# Patient Record
Sex: MALE | Race: BLACK OR AFRICAN AMERICAN | NOT HISPANIC OR LATINO | Employment: FULL TIME | URBAN - METROPOLITAN AREA
[De-identification: names, ages, dates, MRNs, and addresses within clinical notes are randomized per-mention and may not be internally consistent; named-entity substitution may affect disease eponyms.]

---

## 2024-03-14 ENCOUNTER — HOSPITAL ENCOUNTER (INPATIENT)
Facility: HOSPITAL | Age: 54
LOS: 6 days | Discharge: HOME | DRG: 472 | End: 2024-03-20
Attending: EMERGENCY MEDICINE | Admitting: INTERNAL MEDICINE
Payer: MEDICAID

## 2024-03-14 DIAGNOSIS — R29.898 WEAKNESS OF BOTH UPPER EXTREMITIES: ICD-10-CM

## 2024-03-14 DIAGNOSIS — R07.9 CHEST PAIN: ICD-10-CM

## 2024-03-14 DIAGNOSIS — R00.0 TACHYCARDIA: ICD-10-CM

## 2024-03-14 DIAGNOSIS — G95.20 SPINAL CORD COMPRESSION: Primary | ICD-10-CM

## 2024-03-14 DIAGNOSIS — R20.0 LEFT UPPER EXTREMITY NUMBNESS: ICD-10-CM

## 2024-03-14 DIAGNOSIS — R20.0 RIGHT UPPER EXTREMITY NUMBNESS: ICD-10-CM

## 2024-03-14 DIAGNOSIS — M48.02 SPINAL STENOSIS, CERVICAL REGION: ICD-10-CM

## 2024-03-14 DIAGNOSIS — M48.02 CERVICAL SPINAL STENOSIS: ICD-10-CM

## 2024-03-14 PROBLEM — R20.2 PARESTHESIA AND PAIN OF BOTH UPPER EXTREMITIES: Status: ACTIVE | Noted: 2024-03-14

## 2024-03-14 PROBLEM — M79.601 PARESTHESIA AND PAIN OF BOTH UPPER EXTREMITIES: Status: ACTIVE | Noted: 2024-03-14

## 2024-03-14 PROBLEM — E11.9 DM (DIABETES MELLITUS): Status: ACTIVE | Noted: 2024-03-14

## 2024-03-14 PROBLEM — M79.602 PARESTHESIA AND PAIN OF BOTH UPPER EXTREMITIES: Status: ACTIVE | Noted: 2024-03-14

## 2024-03-14 PROBLEM — I10 ESSENTIAL HYPERTENSION: Status: ACTIVE | Noted: 2024-03-14

## 2024-03-14 LAB
ALBUMIN SERPL BCP-MCNC: 4 G/DL (ref 3.5–5.2)
ALP SERPL-CCNC: 78 U/L (ref 55–135)
ALT SERPL W/O P-5'-P-CCNC: 19 U/L (ref 10–44)
ANION GAP SERPL CALC-SCNC: 6 MMOL/L (ref 8–16)
AST SERPL-CCNC: 18 U/L (ref 10–40)
BASOPHILS # BLD AUTO: 0.04 K/UL (ref 0–0.2)
BASOPHILS NFR BLD: 0.6 % (ref 0–1.9)
BILIRUB SERPL-MCNC: 0.6 MG/DL (ref 0.1–1)
BNP SERPL-MCNC: 16 PG/ML (ref 0–99)
BUN SERPL-MCNC: 20 MG/DL (ref 6–20)
CALCIUM SERPL-MCNC: 9.6 MG/DL (ref 8.7–10.5)
CHLORIDE SERPL-SCNC: 104 MMOL/L (ref 95–110)
CO2 SERPL-SCNC: 27 MMOL/L (ref 23–29)
CREAT SERPL-MCNC: 1.4 MG/DL (ref 0.5–1.4)
DIFFERENTIAL METHOD BLD: ABNORMAL
EOSINOPHIL # BLD AUTO: 0.1 K/UL (ref 0–0.5)
EOSINOPHIL NFR BLD: 1.6 % (ref 0–8)
ERYTHROCYTE [DISTWIDTH] IN BLOOD BY AUTOMATED COUNT: 12.3 % (ref 11.5–14.5)
EST. GFR  (NO RACE VARIABLE): 59.4 ML/MIN/1.73 M^2
GLUCOSE SERPL-MCNC: 151 MG/DL (ref 70–110)
HCT VFR BLD AUTO: 34 % (ref 40–54)
HGB BLD-MCNC: 10.4 G/DL (ref 14–18)
IMM GRANULOCYTES # BLD AUTO: 0.03 K/UL (ref 0–0.04)
IMM GRANULOCYTES NFR BLD AUTO: 0.5 % (ref 0–0.5)
LYMPHOCYTES # BLD AUTO: 2.1 K/UL (ref 1–4.8)
LYMPHOCYTES NFR BLD: 33.8 % (ref 18–48)
MAGNESIUM SERPL-MCNC: 1.6 MG/DL (ref 1.6–2.6)
MCH RBC QN AUTO: 25.4 PG (ref 27–31)
MCHC RBC AUTO-ENTMCNC: 30.6 G/DL (ref 32–36)
MCV RBC AUTO: 83 FL (ref 82–98)
MONOCYTES # BLD AUTO: 0.4 K/UL (ref 0.3–1)
MONOCYTES NFR BLD: 6.7 % (ref 4–15)
NEUTROPHILS # BLD AUTO: 3.6 K/UL (ref 1.8–7.7)
NEUTROPHILS NFR BLD: 56.8 % (ref 38–73)
NRBC BLD-RTO: 0 /100 WBC
PLATELET # BLD AUTO: 270 K/UL (ref 150–450)
PMV BLD AUTO: 9.3 FL (ref 9.2–12.9)
POTASSIUM SERPL-SCNC: 4.4 MMOL/L (ref 3.5–5.1)
PROT SERPL-MCNC: 7.3 G/DL (ref 6–8.4)
RBC # BLD AUTO: 4.1 M/UL (ref 4.6–6.2)
SODIUM SERPL-SCNC: 137 MMOL/L (ref 136–145)
TROPONIN I SERPL HS-MCNC: 11.2 PG/ML (ref 0–14.9)
TROPONIN I SERPL HS-MCNC: 12.3 PG/ML (ref 0–14.9)
TSH SERPL DL<=0.005 MIU/L-ACNC: 0.86 UIU/ML (ref 0.34–5.6)
VIT B12 SERPL-MCNC: 664 PG/ML (ref 210–950)
WBC # BLD AUTO: 6.25 K/UL (ref 3.9–12.7)

## 2024-03-14 PROCEDURE — 93005 ELECTROCARDIOGRAM TRACING: CPT | Performed by: GENERAL PRACTICE

## 2024-03-14 PROCEDURE — 80053 COMPREHEN METABOLIC PANEL: CPT

## 2024-03-14 PROCEDURE — 84484 ASSAY OF TROPONIN QUANT: CPT | Mod: 91

## 2024-03-14 PROCEDURE — 99285 EMERGENCY DEPT VISIT HI MDM: CPT | Mod: 25

## 2024-03-14 PROCEDURE — 85025 COMPLETE CBC W/AUTO DIFF WBC: CPT

## 2024-03-14 PROCEDURE — 63600175 PHARM REV CODE 636 W HCPCS: Performed by: NURSE PRACTITIONER

## 2024-03-14 PROCEDURE — 83880 ASSAY OF NATRIURETIC PEPTIDE: CPT

## 2024-03-14 PROCEDURE — 84443 ASSAY THYROID STIM HORMONE: CPT | Performed by: EMERGENCY MEDICINE

## 2024-03-14 PROCEDURE — 99223 1ST HOSP IP/OBS HIGH 75: CPT | Mod: ,,, | Performed by: NEUROLOGICAL SURGERY

## 2024-03-14 PROCEDURE — G0378 HOSPITAL OBSERVATION PER HR: HCPCS

## 2024-03-14 PROCEDURE — 36415 COLL VENOUS BLD VENIPUNCTURE: CPT | Performed by: EMERGENCY MEDICINE

## 2024-03-14 PROCEDURE — 12000002 HC ACUTE/MED SURGE SEMI-PRIVATE ROOM

## 2024-03-14 PROCEDURE — 82607 VITAMIN B-12: CPT | Performed by: EMERGENCY MEDICINE

## 2024-03-14 PROCEDURE — 96372 THER/PROPH/DIAG INJ SC/IM: CPT | Performed by: NURSE PRACTITIONER

## 2024-03-14 PROCEDURE — 25000003 PHARM REV CODE 250: Performed by: NURSE PRACTITIONER

## 2024-03-14 PROCEDURE — 93010 ELECTROCARDIOGRAM REPORT: CPT | Mod: ,,, | Performed by: GENERAL PRACTICE

## 2024-03-14 PROCEDURE — 99499 UNLISTED E&M SERVICE: CPT | Mod: ,,, | Performed by: NEUROLOGICAL SURGERY

## 2024-03-14 PROCEDURE — 83735 ASSAY OF MAGNESIUM: CPT

## 2024-03-14 RX ORDER — LANOLIN ALCOHOL/MO/W.PET/CERES
800 CREAM (GRAM) TOPICAL
Status: DISCONTINUED | OUTPATIENT
Start: 2024-03-14 | End: 2024-03-20

## 2024-03-14 RX ORDER — ENOXAPARIN SODIUM 100 MG/ML
40 INJECTION SUBCUTANEOUS EVERY 24 HOURS
Status: DISCONTINUED | OUTPATIENT
Start: 2024-03-14 | End: 2024-03-17

## 2024-03-14 RX ORDER — VALSARTAN 80 MG/1
320 TABLET ORAL DAILY
Status: DISCONTINUED | OUTPATIENT
Start: 2024-03-15 | End: 2024-03-18

## 2024-03-14 RX ORDER — VALSARTAN 320 MG/1
1 TABLET ORAL DAILY
COMMUNITY
Start: 2023-10-19

## 2024-03-14 RX ORDER — CHLORTHALIDONE 25 MG/1
25 TABLET ORAL DAILY
Status: DISCONTINUED | OUTPATIENT
Start: 2024-03-15 | End: 2024-03-18

## 2024-03-14 RX ORDER — ACETAMINOPHEN 500 MG
1000 TABLET ORAL
Status: ACTIVE | OUTPATIENT
Start: 2024-03-14 | End: 2024-03-14

## 2024-03-14 RX ORDER — GABAPENTIN 300 MG/1
300 CAPSULE ORAL 3 TIMES DAILY
Status: DISCONTINUED | OUTPATIENT
Start: 2024-03-14 | End: 2024-03-20

## 2024-03-14 RX ORDER — AMLODIPINE BESYLATE 10 MG/1
1 TABLET ORAL DAILY
Status: ON HOLD | COMMUNITY
Start: 2024-02-08 | End: 2024-03-19 | Stop reason: HOSPADM

## 2024-03-14 RX ORDER — INSULIN ASPART 100 [IU]/ML
0-5 INJECTION, SOLUTION INTRAVENOUS; SUBCUTANEOUS
Status: DISCONTINUED | OUTPATIENT
Start: 2024-03-14 | End: 2024-03-20 | Stop reason: HOSPADM

## 2024-03-14 RX ORDER — IBUPROFEN 200 MG
24 TABLET ORAL
Status: DISCONTINUED | OUTPATIENT
Start: 2024-03-14 | End: 2024-03-20 | Stop reason: HOSPADM

## 2024-03-14 RX ORDER — SODIUM CHLORIDE 0.9 % (FLUSH) 0.9 %
2 SYRINGE (ML) INJECTION EVERY 12 HOURS PRN
Status: DISCONTINUED | OUTPATIENT
Start: 2024-03-14 | End: 2024-03-20 | Stop reason: HOSPADM

## 2024-03-14 RX ORDER — GLUCAGON 1 MG
1 KIT INJECTION
Status: DISCONTINUED | OUTPATIENT
Start: 2024-03-14 | End: 2024-03-20 | Stop reason: HOSPADM

## 2024-03-14 RX ORDER — ALUMINUM HYDROXIDE, MAGNESIUM HYDROXIDE, AND SIMETHICONE 1200; 120; 1200 MG/30ML; MG/30ML; MG/30ML
30 SUSPENSION ORAL 4 TIMES DAILY PRN
Status: DISCONTINUED | OUTPATIENT
Start: 2024-03-14 | End: 2024-03-19

## 2024-03-14 RX ORDER — AMOXICILLIN 250 MG
1 CAPSULE ORAL DAILY
Status: DISCONTINUED | OUTPATIENT
Start: 2024-03-15 | End: 2024-03-20 | Stop reason: HOSPADM

## 2024-03-14 RX ORDER — AMLODIPINE BESYLATE 5 MG/1
10 TABLET ORAL DAILY
Status: DISCONTINUED | OUTPATIENT
Start: 2024-03-15 | End: 2024-03-20

## 2024-03-14 RX ORDER — CHLORTHALIDONE 25 MG/1
1 TABLET ORAL DAILY
COMMUNITY
Start: 2024-01-25 | End: 2025-01-24

## 2024-03-14 RX ORDER — SODIUM,POTASSIUM PHOSPHATES 280-250MG
2 POWDER IN PACKET (EA) ORAL
Status: DISCONTINUED | OUTPATIENT
Start: 2024-03-14 | End: 2024-03-20 | Stop reason: HOSPADM

## 2024-03-14 RX ORDER — ACETAMINOPHEN 325 MG/1
650 TABLET ORAL EVERY 8 HOURS PRN
Status: DISCONTINUED | OUTPATIENT
Start: 2024-03-14 | End: 2024-03-20 | Stop reason: HOSPADM

## 2024-03-14 RX ORDER — ACETAMINOPHEN 325 MG/1
650 TABLET ORAL EVERY 4 HOURS PRN
Status: DISCONTINUED | OUTPATIENT
Start: 2024-03-14 | End: 2024-03-20 | Stop reason: HOSPADM

## 2024-03-14 RX ORDER — ATORVASTATIN CALCIUM 40 MG/1
40 TABLET, FILM COATED ORAL DAILY
Status: ON HOLD | COMMUNITY
End: 2024-03-19 | Stop reason: HOSPADM

## 2024-03-14 RX ORDER — HYDROCODONE BITARTRATE AND ACETAMINOPHEN 5; 325 MG/1; MG/1
1 TABLET ORAL EVERY 6 HOURS PRN
Status: DISCONTINUED | OUTPATIENT
Start: 2024-03-14 | End: 2024-03-20 | Stop reason: HOSPADM

## 2024-03-14 RX ORDER — TALC
6 POWDER (GRAM) TOPICAL NIGHTLY PRN
Status: DISCONTINUED | OUTPATIENT
Start: 2024-03-14 | End: 2024-03-20 | Stop reason: HOSPADM

## 2024-03-14 RX ORDER — IBUPROFEN 200 MG
16 TABLET ORAL
Status: DISCONTINUED | OUTPATIENT
Start: 2024-03-14 | End: 2024-03-20 | Stop reason: HOSPADM

## 2024-03-14 RX ORDER — NALOXONE HCL 0.4 MG/ML
0.02 VIAL (ML) INJECTION
Status: DISCONTINUED | OUTPATIENT
Start: 2024-03-14 | End: 2024-03-20 | Stop reason: HOSPADM

## 2024-03-14 RX ORDER — ONDANSETRON HYDROCHLORIDE 2 MG/ML
4 INJECTION, SOLUTION INTRAVENOUS EVERY 6 HOURS PRN
Status: DISCONTINUED | OUTPATIENT
Start: 2024-03-14 | End: 2024-03-20

## 2024-03-14 RX ORDER — METHOCARBAMOL 750 MG/1
750 TABLET, FILM COATED ORAL 4 TIMES DAILY
Status: DISCONTINUED | OUTPATIENT
Start: 2024-03-14 | End: 2024-03-20

## 2024-03-14 RX ORDER — ATORVASTATIN CALCIUM 40 MG/1
40 TABLET, FILM COATED ORAL DAILY
Status: DISCONTINUED | OUTPATIENT
Start: 2024-03-15 | End: 2024-03-18

## 2024-03-14 RX ORDER — HYDROMORPHONE HYDROCHLORIDE 1 MG/ML
0.5 INJECTION, SOLUTION INTRAMUSCULAR; INTRAVENOUS; SUBCUTANEOUS
Status: ACTIVE | OUTPATIENT
Start: 2024-03-14 | End: 2024-03-15

## 2024-03-14 RX ADMIN — METHOCARBAMOL TABLETS 750 MG: 750 TABLET, COATED ORAL at 09:03

## 2024-03-14 RX ADMIN — ENOXAPARIN SODIUM 40 MG: 100 INJECTION SUBCUTANEOUS at 09:03

## 2024-03-14 RX ADMIN — GABAPENTIN 300 MG: 300 CAPSULE ORAL at 09:03

## 2024-03-14 NOTE — SUBJECTIVE & OBJECTIVE
"(Not in a hospital admission)      Review of patient's allergies indicates:  No Known Allergies    No past medical history on file.  No past surgical history on file.  Family History    None       Tobacco Use    Smoking status: Not on file    Smokeless tobacco: Not on file   Substance and Sexual Activity    Alcohol use: Not on file    Drug use: Not on file    Sexual activity: Not on file       Objective:     Weight: 104.3 kg (230 lb)  Body mass index is 33.97 kg/m².  Vital Signs (Most Recent):  Temp: 98.4 °F (36.9 °C) (03/14/24 0825)  Pulse: 65 (03/14/24 1702)  Resp: 16 (03/14/24 1657)  BP: (!) 174/80 (03/14/24 1702)  SpO2: 99 % (03/14/24 1702) Vital Signs (24h Range):  Temp:  [98.4 °F (36.9 °C)] 98.4 °F (36.9 °C)  Pulse:  [64-90] 65  Resp:  [10-20] 16  SpO2:  [98 %-100 %] 99 %  BP: (135-204)/() 174/80         Neurosurgery Physical Exam  General: well developed, well nourished, no distress.   Head: normocephalic  Neck: No tracheal deviation.    Neurologic: Alert and oriented. Thought content appropriate.  GCS: E4 V5 M6; Total: 15  Pulmonary: normal respirations, no signs of respiratory distress  Skin: Skin is warm, dry and intact.    Sensory: intact to light touch throughout  Motor Strength: Moves all extremities spontaneously with good tone.    Upper extremity strength bilaterally 5/5 throughout  Lower extremity strength bilaterally 5/5 throughout     Tabor negative bilaterally  Clonus negative bilaterally  Left upper extremity reflexes 3+  Right upper extremity reflexes 1+  Bilaterally lower extremity reflexes 1+    Significant Labs:  Recent Labs   Lab 03/14/24  1115   *      K 4.4      CO2 27   BUN 20   CREATININE 1.4   CALCIUM 9.6   MG 1.6     Recent Labs   Lab 03/14/24  1115   WBC 6.25   HGB 10.4*   HCT 34.0*        No results for input(s): "LABPT", "INR", "APTT" in the last 48 hours.  Microbiology Results (last 7 days)       ** No results found for the last 168 hours. **      "

## 2024-03-14 NOTE — SUBJECTIVE & OBJECTIVE
No past medical history on file.    No past surgical history on file.    Review of patient's allergies indicates:  No Known Allergies    No current facility-administered medications on file prior to encounter.     No current outpatient medications on file prior to encounter.     Family History    None       Tobacco Use    Smoking status: Not on file    Smokeless tobacco: Not on file   Substance and Sexual Activity    Alcohol use: Not on file    Drug use: Not on file    Sexual activity: Not on file     Review of Systems   Constitutional:  Positive for activity change and fatigue.   Neurological:  Positive for weakness and numbness.   All other systems reviewed and are negative.    Objective:     Vital Signs (Most Recent):  Temp: 98.4 °F (36.9 °C) (03/14/24 0825)  Pulse: 65 (03/14/24 1657)  Resp: 16 (03/14/24 1657)  BP: (!) 204/82 (03/14/24 1632)  SpO2: 100 % (03/14/24 1657) Vital Signs (24h Range):  Temp:  [98.4 °F (36.9 °C)] 98.4 °F (36.9 °C)  Pulse:  [64-90] 65  Resp:  [10-20] 16  SpO2:  [98 %-100 %] 100 %  BP: (135-204)/() 204/82     Weight: 104.3 kg (230 lb)  Body mass index is 33.97 kg/m².     Physical Exam  Vitals reviewed.   Constitutional:       General: He is not in acute distress.     Appearance: Normal appearance. He is obese.   HENT:      Head: Normocephalic and atraumatic.      Mouth/Throat:      Mouth: Mucous membranes are moist.   Eyes:      Extraocular Movements: Extraocular movements intact.      Pupils: Pupils are equal, round, and reactive to light.   Cardiovascular:      Rate and Rhythm: Normal rate and regular rhythm.      Pulses: Normal pulses.      Heart sounds: Murmur heard.   Pulmonary:      Effort: Pulmonary effort is normal.      Breath sounds: Normal breath sounds.   Abdominal:      General: Bowel sounds are normal. There is no distension.      Palpations: Abdomen is soft.      Tenderness: There is no abdominal tenderness.   Musculoskeletal:      Cervical back: Normal range of motion  and neck supple. No tenderness.      Right lower leg: No edema.      Left lower leg: No edema.      Comments: Positive Tinel sign, postive Phalen maneuver   Skin:     General: Skin is warm.   Neurological:      Mental Status: He is alert and oriented to person, place, and time.      GCS: GCS eye subscore is 4. GCS verbal subscore is 5. GCS motor subscore is 6.      Cranial Nerves: Cranial nerves 2-12 are intact.      Sensory: Sensory deficit present.      Motor: Weakness and tremor present.      Coordination: Finger-Nose-Finger Test abnormal.      Comments: Bilateral UE with decrease sensation.   Ataxia   Hand grasp weakness              CRANIAL NERVES     CN III, IV, VI   Pupils are equal, round, and reactive to light.       Significant Labs: All pertinent labs within the past 24 hours have been reviewed.  Recent Lab Results         03/14/24  1540   03/14/24  1115        Albumin   4.0       ALP   78       ALT   19       Anion Gap   6       AST   18       Baso #   0.04       Basophil %   0.6       BILIRUBIN TOTAL   0.6  Comment: For infants and newborns, interpretation of results should be based  on gestational age, weight and in agreement with clinical  observations.    Premature Infant recommended reference ranges:  Up to 24 hours.............<8.0 mg/dL  Up to 48 hours............<12.0 mg/dL  3-5 days..................<15.0 mg/dL  6-29 days.................<15.0 mg/dL         BNP   16  Comment: Values of less than 100 pg/ml are consistent with non-CHF populations.       BUN   20       Calcium   9.6       Chloride   104       CO2   27       Creatinine   1.4       Differential Method   Automated       eGFR   59.4       Eos #   0.1       Eos %   1.6       Glucose   151       Gran # (ANC)   3.6       Gran %   56.8       Hematocrit   34.0       Hemoglobin   10.4       Immature Grans (Abs)   0.03  Comment: Mild elevation in immature granulocytes is non specific and   can be seen in a variety of conditions including  stress response,   acute inflammation, trauma and pregnancy. Correlation with other   laboratory and clinical findings is essential.         Immature Granulocytes   0.5       Lymph #   2.1       Lymph %   33.8       Magnesium    1.6       MCH   25.4       MCHC   30.6       MCV   83       Mono #   0.4       Mono %   6.7       MPV   9.3       nRBC   0       Platelet Count   270       Potassium   4.4       PROTEIN TOTAL   7.3       RBC   4.10       RDW   12.3       Sodium   137       Troponin I High Sensitivity 11.2  Comment: Troponin results differ between methods. Do not use   results between Troponin methods interchangeably.    Alkaline Phospatase levels above 400 U/L may   cause false positive results.    Access hsTnI should not be used for patients taking   Asfotase quan (Strensiq).     12.3  Comment: Troponin results differ between methods. Do not use   results between Troponin methods interchangeably.    Alkaline Phospatase levels above 400 U/L may   cause false positive results.    Access hsTnI should not be used for patients taking   Asfotase quan (Strensiq).         TSH   0.860       Vitamin B12   664       WBC   6.25               Significant Imaging: I have reviewed all pertinent imaging results/findings within the past 24 hours.    EKG 12-lead (In process)           Collection Time Result Time QRS Duration OHS QTC Calculation     03/14/24 11:03:28 03/14/24 11:17:25 128 452                               In process by Interface, Lab In [facility] (03/14/24 11:17:32)                           Narrative:     Test Reason : R07.9,     Vent. Rate : 068 BPM     Atrial Rate : 068 BPM     P-R Int : 218 ms          QRS Dur : 128 ms      QT Int : 426 ms       P-R-T Axes : 059 -28 002 degrees     QTc Int : 452 ms     Sinus rhythm with 1st degree A-V block  LVH with QRS widening  Abnormal ECG  No previous ECGs available     Referred By: [email or web address]   SELF           Confirmed By:                                           Imaging Results                  CT Cervical Spine Without Contrast (Final result)  Result time 03/14/24 15:28:54            Final result by Jeanmarie Reeves MD (03/14/24 15:28:54)                           Narrative:     CMS MANDATED QUALITY DATA - CT RADIATION  436     All CT scans at this facility utilize dose modulation, iterative reconstruction, and/or weight based dosing when appropriate to reduce radiation dose to as low as reasonably achievable.     CLINICAL HISTORY:  53 years (1970) Male Spinal stenosis, cervical Numbness (BUE x 4 days)     TECHNIQUE:  CT CERVICAL SPINE WITHOUT IV CONTRAST. Contiguous thin section axial images were obtained of the cervical spine. Sagittal and coronal reformatted images were generated. Note that this exam is suboptimal for evaluation of disc disease (which would be better evaluated on MRI) and does not assess for ligamentous injury or stability.     COMPARISON:  None available.     FINDINGS:  No acute fracture of the cervical spine. No traumatic malalignment of the cervical spine.  No evidence of significant intraspinal abnormality.  No perched, jumped or locked facets seen. Vertebral body heights are maintained. There is straightening of the normal lordotic curvature of the cervical spine. There is mild disc height loss throughout the cervical spine with moderate-sized adjacent endplate osteophytes from C2 through C7, with scattered facet arthropathy (for which this study is not tailored to assess). Visualized brain is unremarkable. Visualized lung apices are essentially clear.     IMPRESSION:  1. No acute fracture or traumatic malalignment of the cervical spine.  2. Straightening of the normal lordotic curvature likely secondary to combination of positioning, degenerative change and/or muscle spasm.  3. Scattered degenerative change throughout the cervical spine.                             .     Electronically signed by:  Jeanmarie Reeves MD  03/14/2024 03:28 PM  CDT Workstation: [id number]                                             MRI Cervical Spine Without Contrast (Final result)  Result time 03/14/24 11:15:42            Final result by Houston Hayden MD (03/14/24 11:15:42)                           Impression:        1. Broad-based disc protrusion with subligamentous extruded component at C3-C4, producing severe spinal canal stenosis and cervical cord compression, with findings of cord edema and or myelopathy.  Neurosurgical consultation is recommended.  2. Additional multilevel degenerative disc disease, with spinal canal stenosis and neural foraminal narrowing as described.  3. Straightening of the normal cervical spinal curvature, which could be positional, or reflective of paraspinal muscular spasm.        Electronically signed by:        Houston Hadyen MD  Date:                                        03/14/2024  Time:                                                11:15                     Narrative:     EXAMINATION:  MRI CERVICAL SPINE WITHOUT CONTRAST     CLINICAL HISTORY:  Cervical radiculopathy, no red flags;     TECHNIQUE:  Routine noncontrast MRI cervical spine.     FINDINGS:  No prior studies for comparison.  There is straightening of the normal cervical spinal curvature, with normal vertebral body heights and alignment, and no acute fractures or destructive osseous lesions.  Degenerative loss of disc height and signal involves all levels, with ventral osteophytes at multiple levels.  The visualized posterior fossa and cervicomedullary junction are normal.  Focal non fluid like intramedullary T2 hyperintensity involves the cervical cord at the C3-C4 level, with the cervical cord otherwise normal.     At C2-C3, there is no significant disc bulging or focal disc herniation, with mild spinal canal stenosis.     At C3-C4, there is a broad-based disc protrusion asymmetric to the right, with subligamentous extruded component.  This results in severe spinal canal  stenosis asymmetric to the right, with cervical cord compression.  There is increased non fluid-like T2 signal in the cord suggesting edema and or myelopathy.  There is severe bilateral foraminal narrowing.     At C4-C5, there is a broad-based central disc protrusion, which contacts the ventral cervical cord and produces moderate spinal canal stenosis.  There is mild-to-moderate bilateral foraminal narrowing.     At C5-C6, there is broad-based disc bulging, indenting the ventral thecal sac and producing mild spinal canal stenosis.  There is moderate right neural foraminal narrowing.     At C6-C7, there is mild broad-based disc bulging and mild spinal canal stenosis, without significant foraminal narrowing.     At C7-T1, there is no significant abnormality.     There are no signal abnormalities of the paraspinal musculature or paraspinal soft tissues.  No enlarged cervical chain lymph nodes.                                                MRI Brain Without Contrast (Final result)  Result time 03/14/24 11:07:04            Final result by Keith Tee MD (03/14/24 11:07:04)                           Impression:        Negative for acute ischemia or acute intracranial pathology.        Electronically signed by:        Keith Tee MD  Date:                                        03/14/2024  Time:                                                11:07                     Narrative:     EXAMINATION:  MRI BRAIN WITHOUT CONTRAST     CLINICAL HISTORY:  Stroke, follow up;     TECHNIQUE:  Multisequence, multiplanar imaging through the brain performed without IV contrast.     COMPARISON:  None     FINDINGS:  Diffusion-weighted imaging is negative for acute ischemia or focal lesion.  Gradient echo images show no abnormal intracranial susceptibility artifact.     No intracranial mass, midline shift, or mass effect.  No significant white matter signal abnormality.  Cerebellar hemispheres and brainstem are unremarkable.   Major intracranial T2 flow voids appear patent.     Mastoid air cells are hypoplastic and clear.  Paranasal sinuses are unremarkable.     Diminished T1 signal intensity of the calvarium and cervical spine, likely on the basis of red marrow reconversion.  Pituitary gland is unremarkable.                                                X-Ray Chest AP Portable (Final result)  Result time 03/14/24 10:58:06            Final result by Houston Hayden MD (03/14/24 10:58:06)                           Impression:        No evidence of active cardiopulmonary disease.        Electronically signed by:        Houston Hayden MD  Date:                                        03/14/2024  Time:                                                10:58                     Narrative:     EXAMINATION:  XR CHEST AP PORTABLE     CLINICAL HISTORY:  Chest Pain;     FINDINGS:  Portable chest radiograph at 0952 hours with no prior studies for comparison shows the cardiomediastinal silhouette and pulmonary vasculature are within normal limits.     The lungs are normally expanded, with no consolidation, pleural effusion, or evidence of pulmonary edema. No confluent infiltrates or pneumothorax. There are no significant osseous abnormalities.

## 2024-03-14 NOTE — H&P
[facility] - Emergency Dept  Hospital Medicine  History & Physical    Patient Name: Obed Alfred  MRN: [id number]  Patient Class: OP- Observation  Admission Date: 3/14/2024  Attending Physician: Marito Saldivar MD   Primary Care Provider: Juany Robertson MD         Patient information was obtained from patient and ER records.     Subjective:     Principal Problem:Weakness of both upper extremities    Chief Complaint:   Chief Complaint   Patient presents with    Numbness     BUE x 4 days        HPI: 53 year old male with a past medical history of HTN, HLD, DM, anemia presents to the emergency room with reports of numbness and weakness to both upper extremities and hands that began 4 days ago. Reports started in both hands than radiated to elbows. 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 Reports resting makes it better and using arms makes it worse. Denies fever, chills, recent travel, URI symptoms, GI symptoms, rash, dysuria, abdominal pain, headache, recent trauma. Started new blood pressure of chlorthalidone a week ago.   In ER, CBC and CMP unremarkable. Troponin negative x2. B12 TSH within normal limits. EKG with 1st degree AV block, no ST elevation, CT c spine: no acute fracture, degenerative changes in cervical spine,  MRI c spine: C3-C4, there is a broad-based disc protrusion asymmetric to the right, with subligamentous extruded component. This results in severe spinal canal stenosis asymmetric to the right, with cervical cord compression. There is increased non fluid-like T2 signal in the cord suggesting edema and or myelopathy. There is severe bilateral foraminal narrowing. C4-C5, there is a broad-based central disc protrusion, which contacts the ventral cervical cord and produces moderate spinal canal stenosis. There is mild-to-moderate bilateral foraminal narrowing.  C5-C6, there is broad-based disc bulging, indenting the  ventral thecal sac and producing mild spinal canal stenosis.  There is moderate right neural foraminal narrowing.At C6-C7, there is mild broad-based disc bulging and mild spinal canal stenosis, without significant foraminal narrowing. MRI brain negative for acute ischemic changes. Admit to hospital medicine with consults to Neurology and Neurosurgery. Dilaudid and Tylenol given in ER.     No past medical history on file.    No past surgical history on file.    Review of patient's allergies indicates:  No Known Allergies    No current facility-administered medications on file prior to encounter.     No current outpatient medications on file prior to encounter.     Family History    None       Tobacco Use    Smoking status: Not on file    Smokeless tobacco: Not on file   Substance and Sexual Activity    Alcohol use: Not on file    Drug use: Not on file    Sexual activity: Not on file     Review of Systems   Constitutional:  Positive for activity change and fatigue.   Neurological:  Positive for weakness and numbness.   All other systems reviewed and are negative.    Objective:     Vital Signs (Most Recent):  Temp: 98.4 °F (36.9 °C) (03/14/24 0825)  Pulse: 65 (03/14/24 1657)  Resp: 16 (03/14/24 1657)  BP: (!) 204/82 (03/14/24 1632)  SpO2: 100 % (03/14/24 1657) Vital Signs (24h Range):  Temp:  [98.4 °F (36.9 °C)] 98.4 °F (36.9 °C)  Pulse:  [64-90] 65  Resp:  [10-20] 16  SpO2:  [98 %-100 %] 100 %  BP: (135-204)/() 204/82     Weight: 104.3 kg (230 lb)  Body mass index is 33.97 kg/m².     Physical Exam  Vitals reviewed.   Constitutional:       General: He is not in acute distress.     Appearance: Normal appearance. He is obese.   HENT:      Head: Normocephalic and atraumatic.      Mouth/Throat:      Mouth: Mucous membranes are moist.   Eyes:      Extraocular Movements: Extraocular movements intact.      Pupils: Pupils are equal, round, and reactive to light.   Cardiovascular:      Rate and Rhythm: Normal rate and  regular rhythm.      Pulses: Normal pulses.      Heart sounds: Murmur heard.   Pulmonary:      Effort: Pulmonary effort is normal.      Breath sounds: Normal breath sounds.   Abdominal:      General: Bowel sounds are normal. There is no distension.      Palpations: Abdomen is soft.      Tenderness: There is no abdominal tenderness.   Musculoskeletal:      Cervical back: Normal range of motion and neck supple. No tenderness.      Right lower leg: No edema.      Left lower leg: No edema.      Comments: Positive Tinel sign, postive Phalen maneuver   Skin:     General: Skin is warm.   Neurological:      Mental Status: He is alert and oriented to person, place, and time.      GCS: GCS eye subscore is 4. GCS verbal subscore is 5. GCS motor subscore is 6.      Cranial Nerves: Cranial nerves 2-12 are intact.      Sensory: Sensory deficit present.      Motor: Weakness and tremor present.      Coordination: Finger-Nose-Finger Test abnormal.      Comments: Bilateral UE with decrease sensation. Hyperreflexia   Ataxia Hand grasp weakness              CRANIAL NERVES     CN III, IV, VI   Pupils are equal, round, and reactive to light.       Significant Labs: All pertinent labs within the past 24 hours have been reviewed.  Recent Lab Results         03/14/24  1540   03/14/24  1115        Albumin   4.0       ALP   78       ALT   19       Anion Gap   6       AST   18       Baso #   0.04       Basophil %   0.6       BILIRUBIN TOTAL   0.6  Comment: For infants and newborns, interpretation of results should be based  on gestational age, weight and in agreement with clinical  observations.    Premature Infant recommended reference ranges:  Up to 24 hours.............<8.0 mg/dL  Up to 48 hours............<12.0 mg/dL  3-5 days..................<15.0 mg/dL  6-29 days.................<15.0 mg/dL         BNP   16  Comment: Values of less than 100 pg/ml are consistent with non-CHF populations.       BUN   20       Calcium   9.6       Chloride    104       CO2   27       Creatinine   1.4       Differential Method   Automated       eGFR   59.4       Eos #   0.1       Eos %   1.6       Glucose   151       Gran # (ANC)   3.6       Gran %   56.8       Hematocrit   34.0       Hemoglobin   10.4       Immature Grans (Abs)   0.03  Comment: Mild elevation in immature granulocytes is non specific and   can be seen in a variety of conditions including stress response,   acute inflammation, trauma and pregnancy. Correlation with other   laboratory and clinical findings is essential.         Immature Granulocytes   0.5       Lymph #   2.1       Lymph %   33.8       Magnesium    1.6       MCH   25.4       MCHC   30.6       MCV   83       Mono #   0.4       Mono %   6.7       MPV   9.3       nRBC   0       Platelet Count   270       Potassium   4.4       PROTEIN TOTAL   7.3       RBC   4.10       RDW   12.3       Sodium   137       Troponin I High Sensitivity 11.2  Comment: Troponin results differ between methods. Do not use   results between Troponin methods interchangeably.    Alkaline Phospatase levels above 400 U/L may   cause false positive results.    Access hsTnI should not be used for patients taking   Asfotase quan (Strensiq).     12.3  Comment: Troponin results differ between methods. Do not use   results between Troponin methods interchangeably.    Alkaline Phospatase levels above 400 U/L may   cause false positive results.    Access hsTnI should not be used for patients taking   Asfotase quan (Strensiq).         TSH   0.860       Vitamin B12   664       WBC   6.25               Significant Imaging: I have reviewed all pertinent imaging results/findings within the past 24 hours.    EKG 12-lead (In process)           Collection Time Result Time QRS Duration OHS QTC Calculation     03/14/24 11:03:28 03/14/24 11:17:25 128 452                               In process by Interface, [facility] (03/14/24 11:17:32)                           Narrative:     Test  Reason : R07.9,     Vent. Rate : 068 BPM     Atrial Rate : 068 BPM     P-R Int : 218 ms          QRS Dur : 128 ms      QT Int : 426 ms       P-R-T Axes : 059 -28 002 degrees     QTc Int : 452 ms     Sinus rhythm with 1st degree A-V block  LVH with QRS widening  Abnormal ECG  No previous ECGs available     Referred By: [email or web address]   SELF           Confirmed By:                                          Imaging Results                  CT Cervical Spine Without Contrast (Final result)  Result time 03/14/24 15:28:54            Final result by Jeanmarie Reeves MD (03/14/24 15:28:54)                           Narrative:     CMS MANDATED QUALITY DATA - CT RADIATION  436     All CT scans at this facility utilize dose modulation, iterative reconstruction, and/or weight based dosing when appropriate to reduce radiation dose to as low as reasonably achievable.     CLINICAL HISTORY:  53 years (1970) Male Spinal stenosis, cervical Numbness (BUE x 4 days)     TECHNIQUE:  CT CERVICAL SPINE WITHOUT IV CONTRAST. Contiguous thin section axial images were obtained of the cervical spine. Sagittal and coronal reformatted images were generated. Note that this exam is suboptimal for evaluation of disc disease (which would be better evaluated on MRI) and does not assess for ligamentous injury or stability.     COMPARISON:  None available.     FINDINGS:  No acute fracture of the cervical spine. No traumatic malalignment of the cervical spine.  No evidence of significant intraspinal abnormality.  No perched, jumped or locked facets seen. Vertebral body heights are maintained. There is straightening of the normal lordotic curvature of the cervical spine. There is mild disc height loss throughout the cervical spine with moderate-sized adjacent endplate osteophytes from C2 through C7, with scattered facet arthropathy (for which this study is not tailored to assess). Visualized brain is unremarkable. Visualized lung apices are essentially  clear.     IMPRESSION:  1. No acute fracture or traumatic malalignment of the cervical spine.  2. Straightening of the normal lordotic curvature likely secondary to combination of positioning, degenerative change and/or muscle spasm.  3. Scattered degenerative change throughout the cervical spine.                             .     Electronically signed by:  Jeanmarie Reeves MD  03/14/2024 03:28 PM CDT Workstation: [id number]                                             MRI Cervical Spine Without Contrast (Final result)  Result time 03/14/24 11:15:42            Final result by Houston Hayden MD (03/14/24 11:15:42)                           Impression:        1. Broad-based disc protrusion with subligamentous extruded component at C3-C4, producing severe spinal canal stenosis and cervical cord compression, with findings of cord edema and or myelopathy.  Neurosurgical consultation is recommended.  2. Additional multilevel degenerative disc disease, with spinal canal stenosis and neural foraminal narrowing as described.  3. Straightening of the normal cervical spinal curvature, which could be positional, or reflective of paraspinal muscular spasm.        Electronically signed by:        Houston Hayden MD  Date:                                        03/14/2024  Time:                                                11:15                     Narrative:     EXAMINATION:  MRI CERVICAL SPINE WITHOUT CONTRAST     CLINICAL HISTORY:  Cervical radiculopathy, no red flags;     TECHNIQUE:  Routine noncontrast MRI cervical spine.     FINDINGS:  No prior studies for comparison.  There is straightening of the normal cervical spinal curvature, with normal vertebral body heights and alignment, and no acute fractures or destructive osseous lesions.  Degenerative loss of disc height and signal involves all levels, with ventral osteophytes at multiple levels.  The visualized posterior fossa and cervicomedullary junction are normal.  Focal  non fluid like intramedullary T2 hyperintensity involves the cervical cord at the C3-C4 level, with the cervical cord otherwise normal.     At C2-C3, there is no significant disc bulging or focal disc herniation, with mild spinal canal stenosis.     At C3-C4, there is a broad-based disc protrusion asymmetric to the right, with subligamentous extruded component.  This results in severe spinal canal stenosis asymmetric to the right, with cervical cord compression.  There is increased non fluid-like T2 signal in the cord suggesting edema and or myelopathy.  There is severe bilateral foraminal narrowing.     At C4-C5, there is a broad-based central disc protrusion, which contacts the ventral cervical cord and produces moderate spinal canal stenosis.  There is mild-to-moderate bilateral foraminal narrowing.     At C5-C6, there is broad-based disc bulging, indenting the ventral thecal sac and producing mild spinal canal stenosis.  There is moderate right neural foraminal narrowing.     At C6-C7, there is mild broad-based disc bulging and mild spinal canal stenosis, without significant foraminal narrowing.     At C7-T1, there is no significant abnormality.     There are no signal abnormalities of the paraspinal musculature or paraspinal soft tissues.  No enlarged cervical chain lymph nodes.                                                MRI Brain Without Contrast (Final result)  Result time 03/14/24 11:07:04            Final result by Keith Tee MD (03/14/24 11:07:04)                           Impression:        Negative for acute ischemia or acute intracranial pathology.        Electronically signed by:        Keith Tee MD  Date:                                        03/14/2024  Time:                                                11:07                     Narrative:     EXAMINATION:  MRI BRAIN WITHOUT CONTRAST     CLINICAL HISTORY:  Stroke, follow up;     TECHNIQUE:  Multisequence, multiplanar imaging  through the brain performed without IV contrast.     COMPARISON:  None     FINDINGS:  Diffusion-weighted imaging is negative for acute ischemia or focal lesion.  Gradient echo images show no abnormal intracranial susceptibility artifact.     No intracranial mass, midline shift, or mass effect.  No significant white matter signal abnormality.  Cerebellar hemispheres and brainstem are unremarkable.  Major intracranial T2 flow voids appear patent.     Mastoid air cells are hypoplastic and clear.  Paranasal sinuses are unremarkable.     Diminished T1 signal intensity of the calvarium and cervical spine, likely on the basis of red marrow reconversion.  Pituitary gland is unremarkable.                                                X-Ray Chest AP Portable (Final result)  Result time 03/14/24 10:58:06            Final result by Houston Hayden MD (03/14/24 10:58:06)                           Impression:        No evidence of active cardiopulmonary disease.        Electronically signed by:        Houston Hayden MD  Date:                                        03/14/2024  Time:                                                10:58                     Narrative:     EXAMINATION:  XR CHEST AP PORTABLE     CLINICAL HISTORY:  Chest Pain;     FINDINGS:  Portable chest radiograph at 0952 hours with no prior studies for comparison shows the cardiomediastinal silhouette and pulmonary vasculature are within normal limits.     The lungs are normally expanded, with no consolidation, pleural effusion, or evidence of pulmonary edema. No confluent infiltrates or pneumothorax. There are no significant osseous abnormalities.                    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 Awaiting Neurosurgery recommendations    Body mass index (BMI) of 33.0-33.9 in adult  Body mass index is 33.97 kg/m². Morbid obesity complicates all aspects of disease management from diagnostic modalities to treatment. Weight loss encouraged and health benefits explained to patient.       DM (diabetes mellitus)  Patient's FSGs are uncontrolled due to hyperglycemia on current medication regimen.  Last A1c reviewed-   Lab Results   Component Value Date    HGBA1C 6.5 (H) 01/25/2024     Most recent fingerstick glucose reviewed- No results for input(s): "POCTGLUCOSE" in the last 24 hours.  Current correctional scale  High  Maintain anti-hyperglycemic dose as follows-   Antihyperglycemics (From admission, onward)      Start     Stop Route Frequency Ordered    03/14/24 1819  insulin aspart U-100 pen 0-5 Units         -- SubQ Before meals & nightly PRN 03/14/24 1720          Hold Oral hypoglycemics while patient is in the hospital.    Essential hypertension  Chronic, uncontrolled. Latest blood pressure and vitals reviewed-     Temp:  [98.4 °F (36.9 °C)]   Pulse:  [64-90]   Resp:  [10-20]   BP: (135-204)/()   SpO2:  [98 %-100 %] .   Home meds for hypertension were reviewed and noted below.       While in the hospital, will manage blood pressure as follows; Continue home antihypertensive regimen    Will utilize p.r.n. blood pressure medication only if patient's blood pressure greater than 180/110 and he develops symptoms such as worsening chest pain or shortness of breath.    Cervical spinal stenosis  See above      Spinal cord compression  See above      Paresthesia and pain of both upper extremities  See above  Awaiting recommendations from Neurosurgery and Neurology        VTE Risk Mitigation (From admission, onward)         "   Ordered     enoxaparin injection 40 mg  Daily         03/14/24 1720     IP VTE HIGH RISK PATIENT  Once         03/14/24 1720     Place sequential compression device  Until discontinued         03/14/24 1720                         On 03/14/2024, patient should be placed in hospital observation services under my care in collaboration with Dr. Saldivar.           Evelia Shaikh, NP  Department of Hospital Medicine  [facility] - Emergency Dept

## 2024-03-14 NOTE — CONSULTS
[facility] - Emergency Dept  Neurosurgery  Consult Note    Inpatient consult to Neurosurgery  Consult performed by: Jessica Sommer PA-C  Consult ordered by: Cherrie Lovell MD  Reason for consult: cervical spinal stenosis        Subjective:     Chief Complaint/Reason for Admission: Hand paraesthesias    History of Present Illness: Mr. Obed Alfred is a 53 year old male with PMHx of HTN, Type 2 diabetes presented to [facility] ED due to bilateral hand paraesthesias and subjective upper extremity weakness. Subjective left arm weakness greater than right. Patient reported approximately 1 month ago he noticed he was having right sided cervical pain that radiated to his right shoulder, anterior upper arm forearm and fingers with cervical extension. He was also recently fired from his job as a  because he had difficulty lifting plates. He reports imbalance, hand clumsiness, and difficulty with zippers. Denies falls, bladder/bowel dysfunction, cervical LILIAN, or cervical surgery.     MRI cervical obtained today revealed C3-4 disc protrusion resulting in severe spinal canal stenosis.    Neurosurgery consulted for spinal cord stenosis and cervical cord compression.       (Not in a hospital admission)      Review of patient's allergies indicates:  No Known Allergies    No past medical history on file.  No past surgical history on file.  Family History    None       Tobacco Use    Smoking status: Not on file    Smokeless tobacco: Not on file   Substance and Sexual Activity    Alcohol use: Not on file    Drug use: Not on file    Sexual activity: Not on file       Objective:     Weight: 104.3 kg (230 lb)  Body mass index is 33.97 kg/m².  Vital Signs (Most Recent):  Temp: 98.4 °F (36.9 °C) (03/14/24 0825)  Pulse: 65 (03/14/24 1702)  Resp: 16 (03/14/24 1657)  BP: (!) 174/80 (03/14/24 1702)  SpO2: 99 % (03/14/24 1702) Vital Signs (24h Range):  Temp:  [98.4 °F (36.9 °C)] 98.4 °F (36.9 °C)  Pulse:  [64-90]  "65  Resp:  [10-20] 16  SpO2:  [98 %-100 %] 99 %  BP: (135-204)/() 174/80         Neurosurgery Physical Exam  General: well developed, well nourished, no distress.   Head: normocephalic  Neck: No tracheal deviation.    Neurologic: Alert and oriented. Thought content appropriate.  GCS: E4 V5 M6; Total: 15  Pulmonary: normal respirations, no signs of respiratory distress  Skin: Skin is warm, dry and intact.    Sensory: intact to light touch throughout  Motor Strength: Moves all extremities spontaneously with good tone.    Upper extremity strength bilaterally 5/5 throughout  Lower extremity strength bilaterally 5/5 throughout     Tabor negative bilaterally  Clonus negative bilaterally  Left upper extremity reflexes 3+  Right upper extremity reflexes 1+  Bilaterally lower extremity reflexes 1+    Significant Labs:  Recent Labs   Lab 03/14/24  1115   *      K 4.4      CO2 27   BUN 20   CREATININE 1.4   CALCIUM 9.6   MG 1.6     Recent Labs   Lab 03/14/24  1115   WBC 6.25   HGB 10.4*   HCT 34.0*        No results for input(s): "LABPT", "INR", "APTT" in the last 48 hours.  Microbiology Results (last 7 days)       ** No results found for the last 168 hours. **            Assessment/Plan:     Spinal cord compression  Mr. Obed Alfred is a 53 year old male with PMHx of HTN, Type 2 diabetes presented to [facility] ED due to bilateral hand paraesthesias and subjective upper extremity weakness. Neurosurgery consulted for spinal cord stenosis and cervical cord compression.     Neurologically intact.    Recommend  -Gabapentin 300mg TID scheduled.   -Robaxin 750mg q6 hours scheduled.    More recommendations to follow.             Thank you for your consult. I will follow-up with patient. Please contact us if you have any additional questions.    CECILE SALINAS PA-C  Neurosurgery  [facility] - Emergency Dept  "

## 2024-03-14 NOTE — ASSESSMENT & PLAN NOTE
Chronic, uncontrolled. Latest blood pressure and vitals reviewed-     Temp:  [98.4 °F (36.9 °C)]   Pulse:  [64-90]   Resp:  [10-20]   BP: (135-204)/()   SpO2:  [98 %-100 %] .   Home meds for hypertension were reviewed and noted below.       While in the hospital, will manage blood pressure as follows; Continue home antihypertensive regimen    Will utilize p.r.n. blood pressure medication only if patient's blood pressure greater than 180/110 and he develops symptoms such as worsening chest pain or shortness of breath.

## 2024-03-14 NOTE — ED PROVIDER NOTES
Encounter Date: 3/14/2024       History     Chief Complaint   Patient presents with    Numbness     BUE x 4 days     HPI    53 year old male with PMH of diabetes, HLD, HTN presented to ED for numbness and weakness in his bilateral upper extremities.  Started feeling symptoms 4 days ago initially starting in his hands bilaterally, and creeping up to his elbow.  Affecting his  strength and lost his job due to unable to uses hands effectively.  Patient endorses resting makes it better, and using it makes his arms make it worse.  Denies fever, chills, recent travel, URI symptoms, GI symptoms, recent hiking, rash, DVTs, decreased p.o. intake, decreased urine output, headache, blurry vision, photophobia.  Did endorse eating a hamburger last week.  Also started on a new blood pressure medication chlorthalidone a week ago.          Review of patient's allergies indicates:  No Known Allergies  No past medical history on file.  No past surgical history on file.  No family history on file.     Review of Systems    See HPI, otherwise negative    Physical Exam     Initial Vitals   BP Pulse Resp Temp SpO2   03/14/24 0822 03/14/24 0822 03/14/24 0822 03/14/24 0825 03/14/24 0822   (!) 176/96 90 16 98.4 °F (36.9 °C) 100 %      MAP       --                Physical Exam    Constitutional: He appears well-developed and well-nourished.   HENT:   Head: Normocephalic and atraumatic.   Eyes: EOM are normal. Pupils are equal, round, and reactive to light.   Neck: Neck supple.   Normal range of motion.  Cardiovascular:            Irregular rhythm   Pulmonary/Chest: Breath sounds normal.   Abdominal: Abdomen is soft.   Musculoskeletal:      Cervical back: Normal range of motion and neck supple.      Comments: Positive Tinel sign, positive Phalen maneuver     Neurological: He is alert and oriented to person, place, and time. He has normal strength. A sensory deficit (Bilateral upper extremities starting from fingers going up, a little past  the elbow.) is present. No cranial nerve deficit.   Skin: Skin is warm and dry. Capillary refill takes less than 2 seconds.   Psychiatric: He has a normal mood and affect.         ED Course   Procedures  Labs Reviewed   CBC W/ AUTO DIFFERENTIAL - Abnormal; Notable for the following components:       Result Value    RBC 4.10 (*)     Hemoglobin 10.4 (*)     Hematocrit 34.0 (*)     MCH 25.4 (*)     MCHC 30.6 (*)     All other components within normal limits   COMPREHENSIVE METABOLIC PANEL - Abnormal; Notable for the following components:    Glucose 151 (*)     eGFR 59.4 (*)     Anion Gap 6 (*)     All other components within normal limits   MAGNESIUM   TROPONIN I HIGH SENSITIVITY   B-TYPE NATRIURETIC PEPTIDE   TSH   VITAMIN B12   VITAMIN B12   TSH   TROPONIN I HIGH SENSITIVITY        ECG Results              EKG 12-lead (In process)        Collection Time Result Time QRS Duration OHS QTC Calculation    03/14/24 11:03:28 03/14/24 11:17:25 128 452                     In process by Interface, Lab In [facility] (03/14/24 11:17:32)                   Narrative:    Test Reason : R07.9,    Vent. Rate : 068 BPM     Atrial Rate : 068 BPM     P-R Int : 218 ms          QRS Dur : 128 ms      QT Int : 426 ms       P-R-T Axes : 059 -28 002 degrees     QTc Int : 452 ms    Sinus rhythm with 1st degree A-V block  LVH with QRS widening  Abnormal ECG  No previous ECGs available    Referred By: [email or web address]   SELF           Confirmed By:                                   Imaging Results              CT Cervical Spine Without Contrast (Final result)  Result time 03/14/24 15:28:54      Final result by Jeanmarie Reeves MD (03/14/24 15:28:54)                   Narrative:    CMS MANDATED QUALITY DATA - CT RADIATION  436    All CT scans at this facility utilize dose modulation, iterative reconstruction, and/or weight based dosing when appropriate to reduce radiation dose to as low as reasonably achievable.    CLINICAL HISTORY:  53 years  (1970) Male Spinal stenosis, cervical Numbness (BUE x 4 days)    TECHNIQUE:  CT CERVICAL SPINE WITHOUT IV CONTRAST. Contiguous thin section axial images were obtained of the cervical spine. Sagittal and coronal reformatted images were generated. Note that this exam is suboptimal for evaluation of disc disease (which would be better evaluated on MRI) and does not assess for ligamentous injury or stability.    COMPARISON:  None available.    FINDINGS:  No acute fracture of the cervical spine. No traumatic malalignment of the cervical spine.  No evidence of significant intraspinal abnormality.  No perched, jumped or locked facets seen. Vertebral body heights are maintained. There is straightening of the normal lordotic curvature of the cervical spine. There is mild disc height loss throughout the cervical spine with moderate-sized adjacent endplate osteophytes from C2 through C7, with scattered facet arthropathy (for which this study is not tailored to assess). Visualized brain is unremarkable. Visualized lung apices are essentially clear.    IMPRESSION:  1. No acute fracture or traumatic malalignment of the cervical spine.  2. Straightening of the normal lordotic curvature likely secondary to combination of positioning, degenerative change and/or muscle spasm.  3. Scattered degenerative change throughout the cervical spine.                    .    Electronically signed by:  Jeanmarie Reeves MD  03/14/2024 03:28 PM CDT Workstation: [id number]                                     MRI Cervical Spine Without Contrast (Final result)  Result time 03/14/24 11:15:42      Final result by Houston Hayden MD (03/14/24 11:15:42)                   Impression:      1. Broad-based disc protrusion with subligamentous extruded component at C3-C4, producing severe spinal canal stenosis and cervical cord compression, with findings of cord edema and or myelopathy.  Neurosurgical consultation is recommended.  2. Additional  multilevel degenerative disc disease, with spinal canal stenosis and neural foraminal narrowing as described.  3. Straightening of the normal cervical spinal curvature, which could be positional, or reflective of paraspinal muscular spasm.      Electronically signed by: Houston Hayden MD  Date:    03/14/2024  Time:    11:15               Narrative:    EXAMINATION:  MRI CERVICAL SPINE WITHOUT CONTRAST    CLINICAL HISTORY:  Cervical radiculopathy, no red flags;    TECHNIQUE:  Routine noncontrast MRI cervical spine.    FINDINGS:  No prior studies for comparison.  There is straightening of the normal cervical spinal curvature, with normal vertebral body heights and alignment, and no acute fractures or destructive osseous lesions.  Degenerative loss of disc height and signal involves all levels, with ventral osteophytes at multiple levels.  The visualized posterior fossa and cervicomedullary junction are normal.  Focal non fluid like intramedullary T2 hyperintensity involves the cervical cord at the C3-C4 level, with the cervical cord otherwise normal.    At C2-C3, there is no significant disc bulging or focal disc herniation, with mild spinal canal stenosis.    At C3-C4, there is a broad-based disc protrusion asymmetric to the right, with subligamentous extruded component.  This results in severe spinal canal stenosis asymmetric to the right, with cervical cord compression.  There is increased non fluid-like T2 signal in the cord suggesting edema and or myelopathy.  There is severe bilateral foraminal narrowing.    At C4-C5, there is a broad-based central disc protrusion, which contacts the ventral cervical cord and produces moderate spinal canal stenosis.  There is mild-to-moderate bilateral foraminal narrowing.    At C5-C6, there is broad-based disc bulging, indenting the ventral thecal sac and producing mild spinal canal stenosis.  There is moderate right neural foraminal narrowing.    At C6-C7, there is mild  broad-based disc bulging and mild spinal canal stenosis, without significant foraminal narrowing.    At C7-T1, there is no significant abnormality.    There are no signal abnormalities of the paraspinal musculature or paraspinal soft tissues.  No enlarged cervical chain lymph nodes.                                       MRI Brain Without Contrast (Final result)  Result time 03/14/24 11:07:04      Final result by Keith Tee MD (03/14/24 11:07:04)                   Impression:      Negative for acute ischemia or acute intracranial pathology.      Electronically signed by: Keith Tee MD  Date:    03/14/2024  Time:    11:07               Narrative:    EXAMINATION:  MRI BRAIN WITHOUT CONTRAST    CLINICAL HISTORY:  Stroke, follow up;    TECHNIQUE:  Multisequence, multiplanar imaging through the brain performed without IV contrast.    COMPARISON:  None    FINDINGS:  Diffusion-weighted imaging is negative for acute ischemia or focal lesion.  Gradient echo images show no abnormal intracranial susceptibility artifact.    No intracranial mass, midline shift, or mass effect.  No significant white matter signal abnormality.  Cerebellar hemispheres and brainstem are unremarkable.  Major intracranial T2 flow voids appear patent.    Mastoid air cells are hypoplastic and clear.  Paranasal sinuses are unremarkable.    Diminished T1 signal intensity of the calvarium and cervical spine, likely on the basis of red marrow reconversion.  Pituitary gland is unremarkable.                                       X-Ray Chest AP Portable (Final result)  Result time 03/14/24 10:58:06      Final result by Houston Hayden MD (03/14/24 10:58:06)                   Impression:      No evidence of active cardiopulmonary disease.      Electronically signed by: Houston Hayden MD  Date:    03/14/2024  Time:    10:58               Narrative:    EXAMINATION:  XR CHEST AP PORTABLE    CLINICAL HISTORY:  Chest Pain;    FINDINGS:  Portable chest  radiograph at 0952 hours with no prior studies for comparison shows the cardiomediastinal silhouette and pulmonary vasculature are within normal limits.    The lungs are normally expanded, with no consolidation, pleural effusion, or evidence of pulmonary edema. No confluent infiltrates or pneumothorax. There are no significant osseous abnormalities.                                       Medications   acetaminophen tablet 1,000 mg (1,000 mg Oral Not Given 3/14/24 1145)   HYDROmorphone injection 0.5 mg (has no administration in time range)     Medical Decision Making  Problems Addressed:  Cervical spinal stenosis: undiagnosed new problem with uncertain prognosis  Chest pain: undiagnosed new problem with uncertain prognosis  Left upper extremity numbness: undiagnosed new problem with uncertain prognosis  Right upper extremity numbness: undiagnosed new problem with uncertain prognosis  Spinal cord compression: undiagnosed new problem with uncertain prognosis    Amount and/or Complexity of Data Reviewed  Labs: ordered.     Details: See narrative below  Radiology: ordered and independent interpretation performed.     Details: See narrative below  ECG/medicine tests: ordered and independent interpretation performed.     Details: See narrative below    Risk  OTC drugs.  Parenteral controlled substances.  Decision regarding hospitalization.                        PGY3 MDM:    In brief, 53-year-old male with past medical history hypertension, diabetes, hyperlipidemia, presented to emergency department with 4 days' worth of bilateral upper extremity numbness and weakness starting from his fingers all the way up to his elbows.    Vital stable, patient nontoxic appearing    Pertinent physical exam per above.    Differentials:  Carpal tunnel syndrome, cervical radiculopathy, spinal stenosis, electrolyte derangements, endocrine abnormalities, CVA, atypical ACS, among others    Orders: Labs, EKG, MRI brain and C-spine, chest  x-ray    Interventions:  Tylenol for pain management.     Results:    Abnormal/pertinent labs:  CBC and CMP unremarkable.  Troponin negative x2.  B12 TSH within normal limits.    Imaging:  MRI brain negative for stroke, MRI C-spine Broad-based disc protrusion with subligamentous extruded component at C3-C4, producing severe spinal canal stenosis and cervical cord compression, with findings of cord edema and or myelopathy    EKG:  Sinus rhythm with first-degree AV block and QRS widening.  No ST depression or elevations concerning for ischemia.    Reassessment:  Appears to be in discomfort    Further interventions:  Ordered IV Dilaudid for additional pain management.    Consults:  Neurosurgery consulted for cervical cord compression, neurology consulted for ascending numbness and weakness.  Medicine consulted for admission.    Dispo:  Patient admitted to medicine team.       Cherrie Lovell MD  [employer] EM PGY3                  Clinical Impression:  Final diagnoses:  [R07.9] Chest pain  [R20.0] Left upper extremity numbness  [R20.0] Right upper extremity numbness  [G95.20] Spinal cord compression (Primary)  [M48.02] Cervical spinal stenosis                 Cherrie Lovell MD  Resident  03/14/24 2028

## 2024-03-14 NOTE — ASSESSMENT & PLAN NOTE
Ataxia present  MRI brain negative for stroke, MRI C-spine Broad-based disc protrusion with subligamentous extruded component at C3-C4, producing severe spinal canal stenosis and cervical cord compression, with findings of cord edema and or myelopathy   Neurology and Neurosurgery consulted  Dilaudid for pain control  PT/OT    C collar  Start decadron, NSAIDs

## 2024-03-14 NOTE — HPI
53 year old male with a past medical history of HTN, HLD, DM, anemia presents to the emergency room with reports of numbness and weakness to both upper extremities and hands that began 4 days ago. Reports started in both hands than radiated to elbows. 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 Reports resting makes it better and using arms makes it worse. Denies fever, chills, recent travel, URI symptoms, GI symptoms, rash, dysuria, abdominal pain, headache, recent trauma. Started new blood pressure of chlorthalidone a week ago.   In ER, CBC and CMP unremarkable. Troponin negative x2. B12 TSH within normal limits. EKG with 1st degree AV block, no ST elevation, CT c spine: no acute fracture, degenerative changes in cervical spine,  MRI c spine: C3-C4, there is a broad-based disc protrusion asymmetric to the right, with subligamentous extruded component. This results in severe spinal canal stenosis asymmetric to the right, with cervical cord compression. There is increased non fluid-like T2 signal in the cord suggesting edema and or myelopathy. There is severe bilateral foraminal narrowing. C4-C5, there is a broad-based central disc protrusion, which contacts the ventral cervical cord and produces moderate spinal canal stenosis. There is mild-to-moderate bilateral foraminal narrowing.  C5-C6, there is broad-based disc bulging, indenting the ventral thecal sac and producing mild spinal canal stenosis.  There is moderate right neural foraminal narrowing.At C6-C7, there is mild broad-based disc bulging and mild spinal canal stenosis, without significant foraminal narrowing. MRI brain negative for acute ischemic changes. Admit to hospital medicine with consults to Neurology and Neurosurgery. Dilaudid and Tylenol given in ER.

## 2024-03-14 NOTE — HPI
Mr. Obed Alfred is a 53 year old male with PMHx of HTN, Type 2 diabetes presented to [facility] ED due to bilateral hand paraesthesias and subjective upper extremity weakness. Subjective left arm weakness greater than right. Patient reported approximately 1 month ago he noticed he was having right sided cervical pain that radiated to his right shoulder, anterior upper arm forearm and fingers with cervical extension. He was also recently fired from his job as a  because he had difficulty lifting plates. He reports imbalance, hand clumsiness, and difficulty with zippers. Denies falls, bladder/bowel dysfunction, cervical LILIAN, or cervical surgery.     MRI cervical obtained today revealed C3-4 disc protrusion resulting in severe spinal canal stenosis.    Neurosurgery consulted for spinal cord stenosis and cervical cord compression.

## 2024-03-14 NOTE — ASSESSMENT & PLAN NOTE
"Patient's FSGs are uncontrolled due to hyperglycemia on current medication regimen.  Last A1c reviewed-   Lab Results   Component Value Date    HGBA1C 6.5 (H) 01/25/2024     Most recent fingerstick glucose reviewed- No results for input(s): "POCTGLUCOSE" in the last 24 hours.  Current correctional scale  High  Maintain anti-hyperglycemic dose as follows-   Antihyperglycemics (From admission, onward)      Start     Stop Route Frequency Ordered    03/14/24 1819  insulin aspart U-100 pen 0-5 Units         -- SubQ Before meals & nightly PRN 03/14/24 1720          Hold Oral hypoglycemics while patient is in the hospital.  "

## 2024-03-14 NOTE — ASSESSMENT & PLAN NOTE
Mr. Obed Alfred is a 53 year old male with PMHx of HTN, Type 2 diabetes presented to [facility] ED due to bilateral hand paraesthesias and subjective upper extremity weakness. Neurosurgery consulted for spinal cord stenosis and cervical cord compression.     Neurologically intact.    Recommend  -Gabapentin 300mg TID scheduled.   -Robaxin 750mg q6 hours scheduled.    More recommendations to follow.

## 2024-03-14 NOTE — ASSESSMENT & PLAN NOTE
Body mass index is 33.97 kg/m². Morbid obesity complicates all aspects of disease management from diagnostic modalities to treatment. Weight loss encouraged and health benefits explained to patient.

## 2024-03-15 LAB
ALBUMIN SERPL BCP-MCNC: 3.8 G/DL (ref 3.5–5.2)
ALP SERPL-CCNC: 76 U/L (ref 55–135)
ALT SERPL W/O P-5'-P-CCNC: 18 U/L (ref 10–44)
ANION GAP SERPL CALC-SCNC: 7 MMOL/L (ref 8–16)
AST SERPL-CCNC: 15 U/L (ref 10–40)
BASOPHILS # BLD AUTO: 0.03 K/UL (ref 0–0.2)
BASOPHILS NFR BLD: 0.5 % (ref 0–1.9)
BILIRUB SERPL-MCNC: 0.6 MG/DL (ref 0.1–1)
BUN SERPL-MCNC: 22 MG/DL (ref 6–20)
CALCIUM SERPL-MCNC: 9.3 MG/DL (ref 8.7–10.5)
CHLORIDE SERPL-SCNC: 104 MMOL/L (ref 95–110)
CO2 SERPL-SCNC: 26 MMOL/L (ref 23–29)
CREAT SERPL-MCNC: 1.4 MG/DL (ref 0.5–1.4)
DIFFERENTIAL METHOD BLD: ABNORMAL
EOSINOPHIL # BLD AUTO: 0.1 K/UL (ref 0–0.5)
EOSINOPHIL NFR BLD: 2 % (ref 0–8)
ERYTHROCYTE [DISTWIDTH] IN BLOOD BY AUTOMATED COUNT: 12.3 % (ref 11.5–14.5)
EST. GFR  (NO RACE VARIABLE): 59.4 ML/MIN/1.73 M^2
GLUCOSE SERPL-MCNC: 114 MG/DL (ref 70–110)
GLUCOSE SERPL-MCNC: 115 MG/DL (ref 70–110)
GLUCOSE SERPL-MCNC: 117 MG/DL (ref 70–110)
GLUCOSE SERPL-MCNC: 158 MG/DL (ref 70–110)
GLUCOSE SERPL-MCNC: 225 MG/DL (ref 70–110)
HCT VFR BLD AUTO: 33.4 % (ref 40–54)
HGB BLD-MCNC: 10.5 G/DL (ref 14–18)
IMM GRANULOCYTES # BLD AUTO: 0.03 K/UL (ref 0–0.04)
IMM GRANULOCYTES NFR BLD AUTO: 0.5 % (ref 0–0.5)
LYMPHOCYTES # BLD AUTO: 2.3 K/UL (ref 1–4.8)
LYMPHOCYTES NFR BLD: 35.3 % (ref 18–48)
MAGNESIUM SERPL-MCNC: 1.5 MG/DL (ref 1.6–2.6)
MCH RBC QN AUTO: 25.9 PG (ref 27–31)
MCHC RBC AUTO-ENTMCNC: 31.4 G/DL (ref 32–36)
MCV RBC AUTO: 83 FL (ref 82–98)
MONOCYTES # BLD AUTO: 0.6 K/UL (ref 0.3–1)
MONOCYTES NFR BLD: 8.5 % (ref 4–15)
NEUTROPHILS # BLD AUTO: 3.5 K/UL (ref 1.8–7.7)
NEUTROPHILS NFR BLD: 53.2 % (ref 38–73)
NRBC BLD-RTO: 0 /100 WBC
PLATELET # BLD AUTO: 263 K/UL (ref 150–450)
PMV BLD AUTO: 9.5 FL (ref 9.2–12.9)
POTASSIUM SERPL-SCNC: 4.3 MMOL/L (ref 3.5–5.1)
PROT SERPL-MCNC: 6.9 G/DL (ref 6–8.4)
RBC # BLD AUTO: 4.05 M/UL (ref 4.6–6.2)
SODIUM SERPL-SCNC: 137 MMOL/L (ref 136–145)
WBC # BLD AUTO: 6.57 K/UL (ref 3.9–12.7)

## 2024-03-15 PROCEDURE — 63600175 PHARM REV CODE 636 W HCPCS: Performed by: NURSE PRACTITIONER

## 2024-03-15 PROCEDURE — 94761 N-INVAS EAR/PLS OXIMETRY MLT: CPT

## 2024-03-15 PROCEDURE — 25000003 PHARM REV CODE 250: Performed by: NURSE PRACTITIONER

## 2024-03-15 PROCEDURE — 97165 OT EVAL LOW COMPLEX 30 MIN: CPT

## 2024-03-15 PROCEDURE — 12000002 HC ACUTE/MED SURGE SEMI-PRIVATE ROOM

## 2024-03-15 PROCEDURE — 85025 COMPLETE CBC W/AUTO DIFF WBC: CPT | Performed by: NURSE PRACTITIONER

## 2024-03-15 PROCEDURE — 83735 ASSAY OF MAGNESIUM: CPT | Performed by: NURSE PRACTITIONER

## 2024-03-15 PROCEDURE — G0378 HOSPITAL OBSERVATION PER HR: HCPCS

## 2024-03-15 PROCEDURE — 36415 COLL VENOUS BLD VENIPUNCTURE: CPT | Performed by: NURSE PRACTITIONER

## 2024-03-15 PROCEDURE — 96372 THER/PROPH/DIAG INJ SC/IM: CPT | Performed by: NURSE PRACTITIONER

## 2024-03-15 PROCEDURE — 80053 COMPREHEN METABOLIC PANEL: CPT | Performed by: NURSE PRACTITIONER

## 2024-03-15 PROCEDURE — 97535 SELF CARE MNGMENT TRAINING: CPT

## 2024-03-15 RX ADMIN — VALSARTAN 320 MG: 80 TABLET, FILM COATED ORAL at 08:03

## 2024-03-15 RX ADMIN — METHOCARBAMOL TABLETS 750 MG: 750 TABLET, COATED ORAL at 04:03

## 2024-03-15 RX ADMIN — GABAPENTIN 300 MG: 300 CAPSULE ORAL at 02:03

## 2024-03-15 RX ADMIN — ENOXAPARIN SODIUM 40 MG: 100 INJECTION SUBCUTANEOUS at 04:03

## 2024-03-15 RX ADMIN — Medication 800 MG: at 04:03

## 2024-03-15 RX ADMIN — INSULIN ASPART 2 UNITS: 100 INJECTION, SOLUTION INTRAVENOUS; SUBCUTANEOUS at 06:03

## 2024-03-15 RX ADMIN — AMLODIPINE BESYLATE 10 MG: 5 TABLET ORAL at 08:03

## 2024-03-15 RX ADMIN — METHOCARBAMOL TABLETS 750 MG: 750 TABLET, COATED ORAL at 08:03

## 2024-03-15 RX ADMIN — ATORVASTATIN CALCIUM 40 MG: 40 TABLET, FILM COATED ORAL at 08:03

## 2024-03-15 RX ADMIN — GABAPENTIN 300 MG: 300 CAPSULE ORAL at 08:03

## 2024-03-15 RX ADMIN — CHLORTHALIDONE 25 MG: 25 TABLET ORAL at 08:03

## 2024-03-15 RX ADMIN — METHOCARBAMOL TABLETS 750 MG: 750 TABLET, COATED ORAL at 12:03

## 2024-03-15 RX ADMIN — Medication 800 MG: at 01:03

## 2024-03-15 NOTE — PROGRESS NOTES
[facility] - Emergency Dept  Neurosurgery  Progress Note    Subjective:     History of Present Illness: Mr. Obed Alfred is a 53 year old male with PMHx of HTN, Type 2 diabetes presented to [facility] ED due to bilateral hand paraesthesias and subjective upper extremity weakness. Subjective left arm weakness greater than right. Patient reported approximately 1 month ago he noticed he was having right sided cervical pain that radiated to his right shoulder, anterior upper arm forearm and fingers with cervical extension. He was also recently fired from his job as a  because he had difficulty lifting plates. He reports imbalance, hand clumsiness, and difficulty with zippers. Denies falls, bladder/bowel dysfunction, cervical LILIAN, or cervical surgery.     MRI cervical obtained today revealed C3-4 disc protrusion resulting in severe spinal canal stenosis.    Neurosurgery consulted for spinal cord stenosis and cervical cord compression.       Post-Op Info:  * No surgery found *       Interval History: 3/15:  No acute events overnight.  Afebrile.  Blood pressure 151/81.  Pulse 72.  Respirations 20.  WBC 6.57.  Hemoglobin 10.5.  Patient found in bed, awake and alert.  He reports no change in upper extremity symptoms with Robaxin and gabapentin.  He denies pain, new extremity weakness, numbness or tingling.    Medications:  Continuous Infusions:  Scheduled Meds:   amLODIPine  10 mg Oral Daily    atorvastatin  40 mg Oral Daily    chlorthalidone  25 mg Oral Daily    enoxparin  40 mg Subcutaneous Daily    gabapentin  300 mg Oral TID    methocarbamoL  750 mg Oral QID    senna-docusate 8.6-50 mg  1 tablet Oral Daily    valsartan  320 mg Oral Daily     PRN Meds:acetaminophen, acetaminophen, aluminum-magnesium hydroxide-simethicone, dextrose 50% in water (D50W), dextrose 50% in water (D50W), glucagon (human recombinant), glucose, glucose, HYDROcodone-acetaminophen, insulin aspart U-100, magnesium oxide, magnesium  "oxide, melatonin, naloxone, ondansetron, potassium bicarbonate, potassium bicarbonate, potassium bicarbonate, potassium, sodium phosphates, potassium, sodium phosphates, potassium, sodium phosphates, sodium chloride 0.9%     Objective:     Weight: 104.3 kg (230 lb)  Body mass index is 33.97 kg/m².  Vital Signs (Most Recent):  Temp: 98.2 °F (36.8 °C) (03/15/24 0751)  Pulse: 72 (03/15/24 0751)  Resp: 20 (03/15/24 0751)  BP: (!) 151/81 (03/15/24 0845)  SpO2: 100 % (03/15/24 0751) Vital Signs (24h Range):  Temp:  [98.2 °F (36.8 °C)] 98.2 °F (36.8 °C)  Pulse:  [64-91] 72  Resp:  [10-20] 20  SpO2:  [98 %-100 %] 100 %  BP: (135-204)/() 151/81         Neurosurgery Physical Exam  General: well developed, well nourished, no distress.   Neck: No tracheal deviation.    Neurologic: Alert and oriented. Thought content appropriate.  GCS: E4 V5 M6; Total: 15  Pulmonary: normal respirations, no signs of respiratory distress  Skin: Skin is warm, dry and intact.    Motor Strength: Moves all extremities spontaneously with good tone.    Bilateral upper extremity strength 5/5  Bilateral lower extremity strength 5/5       Significant Labs:  Recent Labs   Lab 03/14/24  1115 03/15/24  0608   * 114*    137   K 4.4 4.3    104   CO2 27 26   BUN 20 22*   CREATININE 1.4 1.4   CALCIUM 9.6 9.3   MG 1.6 1.5*     Recent Labs   Lab 03/14/24  1115 03/15/24  0608   WBC 6.25 6.57   HGB 10.4* 10.5*   HCT 34.0* 33.4*    263     No results for input(s): "LABPT", "INR", "APTT" in the last 48 hours.  Microbiology Results (last 7 days)       ** No results found for the last 168 hours. **            Assessment/Plan:     Spinal cord compression  Mr. Obed Alfred is a 53 year old male with PMHx of HTN, Type 2 diabetes presented to [facility] ED due to bilateral hand paraesthesias and subjective upper extremity weakness. Neurosurgery consulted for spinal cord stenosis and cervical cord compression.     Neurologically stable.    Pending " medical clearance, anticipate C3-5 ACDF early next week.    Discuss with Dr. Clayton.            CECILE SALINAS PA-C  Neurosurgery  [facility] - Emergency Dept

## 2024-03-15 NOTE — CARE UPDATE
03/15/24 1700   Patient Assessment/Suction   Level of Consciousness (AVPU) alert   Respiratory Effort Unlabored;Normal   Expansion/Accessory Muscles/Retractions no use of accessory muscles   All Lung Fields Breath Sounds clear;equal bilaterally   PRE-TX-O2   Device (Oxygen Therapy) room air   SpO2 100 %   Pulse Oximetry Type Intermittent   $ Pulse Oximetry - Multiple Charge Pulse Oximetry - Multiple

## 2024-03-15 NOTE — SUBJECTIVE & OBJECTIVE
Interval History:  Patient reports ongoing numbness of bilateral upper extremities in clumsy hands and has been dropping things a lot.  No definite motor deficits noted.  Denies any neck trauma or neck pain or headache.  No other subjective complaints, loss of bowel or bladder functions.  Per Neurosurgery, patient needs to have anterior cervical disc fusion surgery next 3-5 days.  Patient denies any angina-like complaints or any breathing difficulties.  Patient reports compliance with medications.    Review of Systems   Constitutional:  Positive for activity change and fatigue.   Neurological:  Positive for weakness and numbness.   All other systems reviewed and are negative.    Objective:     Vital Signs (Most Recent):  Temp: 98.4 °F (36.9 °C) (03/14/24 0825)  Pulse: 71 (03/15/24 0600)  Resp: 16 (03/15/24 0600)  BP: (!) 167/86 (03/15/24 0600)  SpO2: 100 % (03/15/24 0600) Vital Signs (24h Range):  Temp:  [98.4 °F (36.9 °C)] 98.4 °F (36.9 °C)  Pulse:  [64-91] 71  Resp:  [10-20] 16  SpO2:  [98 %-100 %] 100 %  BP: (135-204)/() 167/86     Weight: 104.3 kg (230 lb)  Body mass index is 33.97 kg/m².  No intake or output data in the 24 hours ending 03/15/24 0744      Physical Exam  Vitals reviewed.   Constitutional:       General: He is not in acute distress.     Appearance: Normal appearance. He is obese.   HENT:      Head: Normocephalic and atraumatic.      Mouth/Throat:      Mouth: Mucous membranes are moist.   Eyes:      Extraocular Movements: Extraocular movements intact.      Pupils: Pupils are equal, round, and reactive to light.   Cardiovascular:      Rate and Rhythm: Normal rate and regular rhythm.      Pulses: Normal pulses.      Heart sounds: Murmur heard.   Pulmonary:      Effort: Pulmonary effort is normal.      Breath sounds: Normal breath sounds.   Abdominal:      General: Bowel sounds are normal. There is no distension.      Palpations: Abdomen is soft.      Tenderness: There is no abdominal  "tenderness.   Musculoskeletal:      Cervical back: Normal range of motion and neck supple. No tenderness.      Right lower leg: No edema.      Left lower leg: No edema.      Comments: Positive Tinel sign, postive Phalen maneuver   Skin:     General: Skin is warm.   Neurological:      Mental Status: He is alert and oriented to person, place, and time.      GCS: GCS eye subscore is 4. GCS verbal subscore is 5. GCS motor subscore is 6.      Cranial Nerves: Cranial nerves 2-12 are intact.      Sensory: Sensory deficit present.      Motor: Weakness and tremor present.      Coordination: Finger-Nose-Finger Test abnormal.      Comments: Bilateral UE with decrease sensation.   Ataxia   Hand grasp weakness             Significant Labs: All pertinent labs within the past 24 hours have been reviewed.  CBC:   Recent Labs   Lab 03/14/24  1115 03/15/24  0608   WBC 6.25 6.57   HGB 10.4* 10.5*   HCT 34.0* 33.4*    263     CMP:   Recent Labs   Lab 03/14/24  1115 03/15/24  0608    137   K 4.4 4.3    104   CO2 27 26   * 114*   BUN 20 22*   CREATININE 1.4 1.4   CALCIUM 9.6 9.3   PROT 7.3 6.9   ALBUMIN 4.0 3.8   BILITOT 0.6 0.6   ALKPHOS 78 76   AST 18 15   ALT 19 18   ANIONGAP 6* 7*     Urine Studies: No results for input(s): "COLORU", "APPEARANCEUA", "PHUR", "SPECGRAV", "PROTEINUA", "GLUCUA", "KETONESU", "BILIRUBINUA", "OCCULTUA", "NITRITE", "UROBILINOGEN", "LEUKOCYTESUR", "RBCUA", "WBCUA", "BACTERIA", "SQUAMEPITHEL", "HYALINECASTS" in the last 48 hours.    Invalid input(s): "WRIGHTSUR"  Microbiology Results (last 7 days)       ** No results found for the last 168 hours. **        Significant Imaging:   CXR: No evidence of active cardiopulmonary disease.     MRI Brain: Negative for acute ischemia or acute intracranial pathology.     MRI C-spine:  1. Broad-based disc protrusion with subligamentous extruded component at C3-C4, producing severe spinal canal stenosis and cervical cord compression, with findings " of cord edema and or myelopathy.  Neurosurgical consultation is recommended.  2. Additional multilevel degenerative disc disease, with spinal canal stenosis and neural foraminal narrowing as described.  3. Straightening of the normal cervical spinal curvature, which could be positional, or reflective of paraspinal muscular spasm.    CT C-spine:  1. No acute fracture or traumatic malalignment of the cervical spine.  2. Straightening of the normal lordotic curvature likely secondary to combination of positioning, degenerative change and/or muscle spasm.  3. Scattered degenerative change throughout the cervical spine.

## 2024-03-15 NOTE — CONSULTS
[facility]  Neurology Consult    Patient Name: Obed Alfred  MRN: [id number]  : 1970  TODAY'S DATE: 03/15/2024  ADMIT DATE: 3/14/2024  8:19 AM                                          CONSULTED PROVIDER: Radha Chaves MD, Neurologist. On-call Phone: [phone]  CONSULT REQUESTED BY: Naida Godfrey MD     Chief Complaint   Patient presents with    Numbness     BUE x 4 days       HPI per EMR:  53 year old male with a past medical history of HTN, HLD, DM, anemia presents to the emergency room with reports of numbness and weakness to both upper extremities and hands that began 4 days ago. Reports started in both hands than radiated to elbows. 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 Reports resting makes it better and using arms makes it worse. Denies fever, chills, recent travel, URI symptoms, GI symptoms, rash, dysuria, abdominal pain, headache, recent trauma. Started new blood pressure of chlorthalidone a week ago.   In ER, CBC and CMP unremarkable. Troponin negative x2. B12 TSH within normal limits. EKG with 1st degree AV block, no ST elevation, CT c spine: no acute fracture, degenerative changes in cervical spine,  MRI c spine: C3-C4, there is a broad-based disc protrusion asymmetric to the right, with subligamentous extruded component. This results in severe spinal canal stenosis asymmetric to the right, with cervical cord compression. There is increased non fluid-like T2 signal in the cord suggesting edema and or myelopathy. There is severe bilateral foraminal narrowing. C4-C5, there is a broad-based central disc protrusion, which contacts the ventral cervical cord and produces moderate spinal canal stenosis. There is mild-to-moderate bilateral foraminal narrowing.  C5-C6, there is broad-based disc bulging, indenting the ventral thecal sac and producing mild spinal canal stenosis.  There is moderate right neural foraminal  narrowing.At C6-C7, there is mild broad-based disc bulging and mild spinal canal stenosis, without significant foraminal narrowing. MRI brain negative for acute ischemic changes. Admit to hospital medicine with consults to Neurology and Neurosurgery. Dilaudid and Tylenol given in ER.     Neurology Consult: Patient was seen and examined by me today. He is a 54 yo man with history of HTN, HLD and anemia who presented to the ED with numbness and weakness of upper extremities that reportedly started 4 days prior to admit. He was working as a  and lost his job because he would be dropping things from his hands. He denied any weakness on his lower extremities, bowel or bladder incontinence, slurred speech, vision loss, falls or gait imbalance. C-spine MRI was obtained and showed severe C3-4 stenosis with cord compression and myelopathy, as well as mild to moderate stenosis at C4-5, C5-6 and C6-7 levels, so neurology and neurosurgery were consulted for evaluation and treatment.    Review of Systems:    A comprehensive ROS was performed and all systems were negative except as noted above in HPI.     Past Medical History:  has no past medical history on file.    Past Surgical History:  has no past surgical history on file.    Family Medical History: family history is not on file.    Social History:      PCP: Juany Robertson MD    Allergies: Review of patient's allergies indicates:  No Known Allergies    Medications:   No current facility-administered medications on file prior to encounter.     Current Outpatient Medications on File Prior to Encounter   Medication Sig Dispense Refill    amLODIPine (NORVASC) 10 MG tablet Take 1 tablet by mouth once daily.      atorvastatin (LIPITOR) 40 MG tablet Take 40 mg by mouth once daily.      chlorthalidone (HYGROTEN) 25 MG Tab Take 1 tablet by mouth once daily.      valsartan (DIOVAN) 320 MG tablet Take 1 tablet by mouth once daily.       Scheduled Meds:   amLODIPine  10 mg  Oral Daily    atorvastatin  40 mg Oral Daily    chlorthalidone  25 mg Oral Daily    enoxparin  40 mg Subcutaneous Daily    gabapentin  300 mg Oral TID    methocarbamoL  750 mg Oral QID    senna-docusate 8.6-50 mg  1 tablet Oral Daily    valsartan  320 mg Oral Daily     Continuous Infusions:  PRN Meds:.acetaminophen, acetaminophen, aluminum-magnesium hydroxide-simethicone, dextrose 50% in water (D50W), dextrose 50% in water (D50W), glucagon (human recombinant), glucose, glucose, HYDROcodone-acetaminophen, insulin aspart U-100, magnesium oxide, magnesium oxide, melatonin, naloxone, ondansetron, potassium bicarbonate, potassium bicarbonate, potassium bicarbonate, potassium, sodium phosphates, potassium, sodium phosphates, potassium, sodium phosphates, sodium chloride 0.9%      Physical Exam  Current Vitals:  Vitals:    03/15/24 0845   BP: (!) 151/81   Pulse:    Resp:    Temp:        Physical Exam:  General: AO x4  HEENT: PERRL, EOMI  CV: RRR  Lungs: no respiratory distress  Abdomen: soft, non tender    Neurological Exam    MENTAL STATUS EXAM:  Level of alertness: Alert  Level of attention: Attentive w/out deficit  Orientation/Awareness: intact to person, place, time, situation  Language: fluent. Comprehension/repetition/naming intact    CRANIAL NERVE EXAM:  II/III: fundoscopic exam deferred, PERRL; visual fields full to confrontation  III/IV/VI: EOMI w/out evidence of nystagmus, strabismus, or evoked diplopia  V: no deficits appreciated to light touch  VII: no facial asymmetry noted  VIII: no deficits in hearing bilaterally  IX/X: palate @ ML and raises symmetrically  XI: shoulder shrug 5/5 bilaterally; head turn 5/5 bilaterally  XII: tongue to midline w/out asymmetry  No dysarthria noted on exam.    MOTOR EXAM:  Bulk and Tone: normal throughout  Strength is 4+/5 in left upper extremity, 4/5 right upper extremity. 5/5 in bilateral lower extremities.  Mild action tremor, slow finger tapping    REFLEXES:  3+ in bilateral  "upper and 2+ lower extremities, present Tabor's and pectoral spread.    SENSORY EXAM  Light touch mildly decreased in both upper extremities, rest intact.    COORDINATION/CEREBELLAR EXAM:  FTN: very mild dysmetria in both arms  HTS: No evidence of appendicular ataxia    GAIT:  Deferred for safety.    Laboratory Data & Studies    Recent Labs   Lab 03/14/24  1115 03/15/24  0608   WBC 6.25 6.57   HGB 10.4* 10.5*    263   MCV 83 83       Recent Labs   Lab 03/14/24  1115 03/15/24  0608    137   K 4.4 4.3    104   CO2 27 26   BUN 20 22*   * 114*   CALCIUM 9.6 9.3   MG 1.6 1.5*       Recent Labs   Lab 03/14/24  1115 03/15/24  0608   PROT 7.3 6.9   ALBUMIN 4.0 3.8   BILITOT 0.6 0.6   AST 18 15   ALT 19 18   ALKPHOS 78 76       No results for input(s): "LABPT", "INR", "APTT" in the last 168 hours.    Recent Labs   Lab 03/14/24  1115   TSH 0.860         Microbiology:  Microbiology Results (last 7 days)       ** No results found for the last 168 hours. **              Imaging:  CT Cervical Spine Without Contrast    Result Date: 3/14/2024  CMS MANDATED QUALITY DATA - CT RADIATION  436 All CT scans at this facility utilize dose modulation, iterative reconstruction, and/or weight based dosing when appropriate to reduce radiation dose to as low as reasonably achievable. CLINICAL HISTORY: 53 years (1970) Male Spinal stenosis, cervical Numbness (BUE x 4 days) TECHNIQUE: CT CERVICAL SPINE WITHOUT IV CONTRAST. Contiguous thin section axial images were obtained of the cervical spine. Sagittal and coronal reformatted images were generated. Note that this exam is suboptimal for evaluation of disc disease (which would be better evaluated on MRI) and does not assess for ligamentous injury or stability. COMPARISON: None available. FINDINGS: No acute fracture of the cervical spine. No traumatic malalignment of the cervical spine.  No evidence of significant intraspinal abnormality.  No perched, jumped or " locked facets seen. Vertebral body heights are maintained. There is straightening of the normal lordotic curvature of the cervical spine. There is mild disc height loss throughout the cervical spine with moderate-sized adjacent endplate osteophytes from C2 through C7, with scattered facet arthropathy (for which this study is not tailored to assess). Visualized brain is unremarkable. Visualized lung apices are essentially clear. IMPRESSION: 1. No acute fracture or traumatic malalignment of the cervical spine. 2. Straightening of the normal lordotic curvature likely secondary to combination of positioning, degenerative change and/or muscle spasm. 3. Scattered degenerative change throughout the cervical spine. . Electronically signed by:  Jeanmarie Reeves MD  03/14/2024 03:28 PM CDT Workstation: [id number]    MRI Cervical Spine Without Contrast    Result Date: 3/14/2024  EXAMINATION: MRI CERVICAL SPINE WITHOUT CONTRAST CLINICAL HISTORY: Cervical radiculopathy, no red flags; TECHNIQUE: Routine noncontrast MRI cervical spine. FINDINGS: No prior studies for comparison.  There is straightening of the normal cervical spinal curvature, with normal vertebral body heights and alignment, and no acute fractures or destructive osseous lesions.  Degenerative loss of disc height and signal involves all levels, with ventral osteophytes at multiple levels.  The visualized posterior fossa and cervicomedullary junction are normal.  Focal non fluid like intramedullary T2 hyperintensity involves the cervical cord at the C3-C4 level, with the cervical cord otherwise normal. At C2-C3, there is no significant disc bulging or focal disc herniation, with mild spinal canal stenosis. At C3-C4, there is a broad-based disc protrusion asymmetric to the right, with subligamentous extruded component.  This results in severe spinal canal stenosis asymmetric to the right, with cervical cord compression.  There is increased non fluid-like T2 signal  in the cord suggesting edema and or myelopathy.  There is severe bilateral foraminal narrowing. At C4-C5, there is a broad-based central disc protrusion, which contacts the ventral cervical cord and produces moderate spinal canal stenosis.  There is mild-to-moderate bilateral foraminal narrowing. At C5-C6, there is broad-based disc bulging, indenting the ventral thecal sac and producing mild spinal canal stenosis.  There is moderate right neural foraminal narrowing. At C6-C7, there is mild broad-based disc bulging and mild spinal canal stenosis, without significant foraminal narrowing. At C7-T1, there is no significant abnormality. There are no signal abnormalities of the paraspinal musculature or paraspinal soft tissues.  No enlarged cervical chain lymph nodes.     1. Broad-based disc protrusion with subligamentous extruded component at C3-C4, producing severe spinal canal stenosis and cervical cord compression, with findings of cord edema and or myelopathy.  Neurosurgical consultation is recommended. 2. Additional multilevel degenerative disc disease, with spinal canal stenosis and neural foraminal narrowing as described. 3. Straightening of the normal cervical spinal curvature, which could be positional, or reflective of paraspinal muscular spasm. Electronically signed by: Houston Hayden MD Date:    03/14/2024 Time:    11:15    MRI Brain Without Contrast    Result Date: 3/14/2024  EXAMINATION: MRI BRAIN WITHOUT CONTRAST CLINICAL HISTORY: Stroke, follow up; TECHNIQUE: Multisequence, multiplanar imaging through the brain performed without IV contrast. COMPARISON: None FINDINGS: Diffusion-weighted imaging is negative for acute ischemia or focal lesion.  Gradient echo images show no abnormal intracranial susceptibility artifact. No intracranial mass, midline shift, or mass effect.  No significant white matter signal abnormality.  Cerebellar hemispheres and brainstem are unremarkable.  Major intracranial T2 flow voids  appear patent. Mastoid air cells are hypoplastic and clear.  Paranasal sinuses are unremarkable. Diminished T1 signal intensity of the calvarium and cervical spine, likely on the basis of red marrow reconversion.  Pituitary gland is unremarkable.     Negative for acute ischemia or acute intracranial pathology. Electronically signed by: Keith Tee MD Date:    03/14/2024 Time:    11:07    X-Ray Chest AP Portable    Result Date: 3/14/2024  EXAMINATION: XR CHEST AP PORTABLE CLINICAL HISTORY: Chest Pain; FINDINGS: Portable chest radiograph at 0952 hours with no prior studies for comparison shows the cardiomediastinal silhouette and pulmonary vasculature are within normal limits. The lungs are normally expanded, with no consolidation, pleural effusion, or evidence of pulmonary edema. No confluent infiltrates or pneumothorax. There are no significant osseous abnormalities.     No evidence of active cardiopulmonary disease. Electronically signed by: Houston Hayden MD Date:    03/14/2024 Time:    10:58        Assessment and Plan:    Bilateral upper extremity weakness and numbness - Cervical myelopathy with evidence of severe spinal stenosis and cord edema  54 yo man with history of HTN, HLD and anemia who presented to the ED with numbness and weakness of upper extremities that reportedly started 4 days prior to admit. C-spine MRI was obtained and showed severe C3-4 stenosis with cord compression and myelopathy, as well as mild to moderate stenosis at C4-5, C5-6 and C6-7 levels, so neurology and neurosurgery were consulted for evaluation and treatment.  - Admitted to hospital medicine with q4 hour neuro checks, on telemetry, continuous pulse oximetry  - Neurosurgery consulted and planning of surgery next week  - Started on gabapentin 300 mg TID and methocarbamol 750 mg QID for pain and muscle spasms  - [place]-J collar when ambulating  - Patient will need C-spine surgery to relieve the cord compression and improve symptoms.  Neuro exam showing hyperreflexia, so neuropathy is low on the differential.  - If symptoms suddenly worsen or there are new neuro deficits, patient will need repeat CT/MRI C-spine and neurosurgery should be called STAT  - Diet after eval per SLP  - MRI C-spine as above  - Maintain Euthermia with Tylenol prn temp > 37.2 degrees C.  - Assessment for rehab with PT/OT/SLP evaluation and treatment.  - workup and treatment of metabolic and infectious abnormalities as per primary team  - Will follow peripherally. Please call with questions, concerns or neurological decline.      DVT prophylaxis with chemo/SCD prophylaxis      [phone] 7 days from discharge.       All questions were answered.                              Thank you kindly for including us in the care of this patient. Please do not hesitate to contact us with any questions.       65 minutes of care time has been spent evaluating with the patient. Time includes chart review not limited to diagnostic imaging, labs, and vitals, patient assessment, discussion with family and nursing, current order evaluations, and new order entries.      Radha Chaves MD  Neurology

## 2024-03-15 NOTE — NURSING
Nurses Note -- 4 Eyes      3/15/2024   3:24 PM      Skin assessed during: Transfer      [x] No Altered Skin Integrity Present    []Prevention Measures Documented      [] Yes- Altered Skin Integrity Present or Discovered   [] LDA Added if Not in Epic (Describe Wound)   [] New Altered Skin Integrity was Present on Admit and Documented in LDA   [] Wound Image Taken    Wound Care Consulted? No    Attending Nurse:  Omar Santos RN/Staff Member:  Domonique

## 2024-03-15 NOTE — ASSESSMENT & PLAN NOTE
Ataxia present  MRI brain negative for stroke, MRI C-spine Broad-based disc protrusion with subligamentous extruded component at C3-C4, producing severe spinal canal stenosis and cervical cord compression, with findings of cord edema and or myelopathy   Neurology and Neurosurgery consulted.  Dr. Boateng recommendations noted.  Patient received [place] J collar.  Patient is cleared for neurosurgery.  Patient remains at intermediate risk for any cardiopulmonary complication in perioperative.  Due to history of diabetes mellitus D[place]b.  Continue gabapentin and Robaxin use with fall precautions.  Dilaudid for pain control  PT/OT

## 2024-03-15 NOTE — PROGRESS NOTES
Formerly [facility] - Emergency Dept  Hospital Medicine  Progress Note    Patient Name: Obed Alfred  MRN: [id number]  Patient Class: OP- Observation   Admission Date: 3/14/2024  Length of Stay: 0 days  Attending Physician: Naida Godfrey MD  Primary Care Provider: Juany Robertson MD        Subjective:     Principal Problem:Weakness of both upper extremities        HPI:  53 year old male with a past medical history of HTN, HLD, DM, anemia presents to the emergency room with reports of numbness and weakness to both upper extremities and hands that began 4 days ago. Reports started in both hands than radiated to elbows. 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 Reports resting makes it better and using arms makes it worse. Denies fever, chills, recent travel, URI symptoms, GI symptoms, rash, dysuria, abdominal pain, headache, recent trauma. Started new blood pressure of chlorthalidone a week ago.   In ER, CBC and CMP unremarkable. Troponin negative x2. B12 TSH within normal limits. EKG with 1st degree AV block, no ST elevation, CT c spine: no acute fracture, degenerative changes in cervical spine,  MRI c spine: C3-C4, there is a broad-based disc protrusion asymmetric to the right, with subligamentous extruded component. This results in severe spinal canal stenosis asymmetric to the right, with cervical cord compression. There is increased non fluid-like T2 signal in the cord suggesting edema and or myelopathy. There is severe bilateral foraminal narrowing. C4-C5, there is a broad-based central disc protrusion, which contacts the ventral cervical cord and produces moderate spinal canal stenosis. There is mild-to-moderate bilateral foraminal narrowing.  C5-C6, there is broad-based disc bulging, indenting the ventral thecal sac and producing mild spinal canal stenosis.  There is moderate right neural foraminal narrowing.At C6-C7, there is mild broad-based  disc bulging and mild spinal canal stenosis, without significant foraminal narrowing. MRI brain negative for acute ischemic changes. Admit to hospital medicine with consults to Neurology and Neurosurgery. Dilaudid and Tylenol given in ER.     Overview/Hospital Course:  No notes on file    Interval History:  Patient reports ongoing numbness of bilateral upper extremities in clumsy hands and has been dropping things a lot.  No definite motor deficits noted.  Denies any neck trauma or neck pain or headache.  No other subjective complaints, loss of bowel or bladder functions.  Per Neurosurgery, patient needs to have anterior cervical disc fusion surgery next 3-5 days.  Patient denies any angina-like complaints or any breathing difficulties.  Patient reports compliance with medications.    Review of Systems   Constitutional:  Positive for activity change and fatigue.   Neurological:  Positive for weakness and numbness.   All other systems reviewed and are negative.    Objective:     Vital Signs (Most Recent):  Temp: 98.4 °F (36.9 °C) (03/14/24 0825)  Pulse: 71 (03/15/24 0600)  Resp: 16 (03/15/24 0600)  BP: (!) 167/86 (03/15/24 0600)  SpO2: 100 % (03/15/24 0600) Vital Signs (24h Range):  Temp:  [98.4 °F (36.9 °C)] 98.4 °F (36.9 °C)  Pulse:  [64-91] 71  Resp:  [10-20] 16  SpO2:  [98 %-100 %] 100 %  BP: (135-204)/() 167/86     Weight: 104.3 kg (230 lb)  Body mass index is 33.97 kg/m².  No intake or output data in the 24 hours ending 03/15/24 0744      Physical Exam  Vitals reviewed.   Constitutional:       General: He is not in acute distress.     Appearance: Normal appearance. He is obese.   HENT:      Head: Normocephalic and atraumatic.      Mouth/Throat:      Mouth: Mucous membranes are moist.   Eyes:      Extraocular Movements: Extraocular movements intact.      Pupils: Pupils are equal, round, and reactive to light.   Cardiovascular:      Rate and Rhythm: Normal rate and regular rhythm.      Pulses: Normal  "pulses.      Heart sounds: Murmur heard.   Pulmonary:      Effort: Pulmonary effort is normal.      Breath sounds: Normal breath sounds.   Abdominal:      General: Bowel sounds are normal. There is no distension.      Palpations: Abdomen is soft.      Tenderness: There is no abdominal tenderness.   Musculoskeletal:      Cervical back: Normal range of motion and neck supple. No tenderness.      Right lower leg: No edema.      Left lower leg: No edema.      Comments: Positive Tinel sign, postive Phalen maneuver   Skin:     General: Skin is warm.   Neurological:      Mental Status: He is alert and oriented to person, place, and time.      GCS: GCS eye subscore is 4. GCS verbal subscore is 5. GCS motor subscore is 6.      Cranial Nerves: Cranial nerves 2-12 are intact.      Sensory: Sensory deficit present.      Motor: Weakness and tremor present.      Coordination: Finger-Nose-Finger Test abnormal.      Comments: Bilateral UE with decrease sensation.   Ataxia   Hand grasp weakness             Significant Labs: All pertinent labs within the past 24 hours have been reviewed.  CBC:   Recent Labs   Lab 03/14/24  1115 03/15/24  0608   WBC 6.25 6.57   HGB 10.4* 10.5*   HCT 34.0* 33.4*    263     CMP:   Recent Labs   Lab 03/14/24  1115 03/15/24  0608    137   K 4.4 4.3    104   CO2 27 26   * 114*   BUN 20 22*   CREATININE 1.4 1.4   CALCIUM 9.6 9.3   PROT 7.3 6.9   ALBUMIN 4.0 3.8   BILITOT 0.6 0.6   ALKPHOS 78 76   AST 18 15   ALT 19 18   ANIONGAP 6* 7*     Urine Studies: No results for input(s): "COLORU", "APPEARANCEUA", "PHUR", "SPECGRAV", "PROTEINUA", "GLUCUA", "KETONESU", "BILIRUBINUA", "OCCULTUA", "NITRITE", "UROBILINOGEN", "LEUKOCYTESUR", "RBCUA", "WBCUA", "BACTERIA", "SQUAMEPITHEL", "HYALINECASTS" in the last 48 hours.    Invalid input(s): "WRIGHTSUR"  Microbiology Results (last 7 days)       ** No results found for the last 168 hours. **        Significant Imaging:   CXR: No evidence of " active cardiopulmonary disease.     MRI Brain: Negative for acute ischemia or acute intracranial pathology.     MRI C-spine:  1. Broad-based disc protrusion with subligamentous extruded component at C3-C4, producing severe spinal canal stenosis and cervical cord compression, with findings of cord edema and or myelopathy.  Neurosurgical consultation is recommended.  2. Additional multilevel degenerative disc disease, with spinal canal stenosis and neural foraminal narrowing as described.  3. Straightening of the normal cervical spinal curvature, which could be positional, or reflective of paraspinal muscular spasm.    CT C-spine:  1. No acute fracture or traumatic malalignment of the cervical spine.  2. Straightening of the normal lordotic curvature likely secondary to combination of positioning, degenerative change and/or muscle spasm.  3. Scattered degenerative change throughout the cervical spine.    Assessment/Plan:      * Weakness of both upper extremities  Ataxia present  MRI brain negative for stroke, MRI C-spine Broad-based disc protrusion with subligamentous extruded component at C3-C4, producing severe spinal canal stenosis and cervical cord compression, with findings of cord edema and or myelopathy   Neurology and Neurosurgery consulted.  Dr. Boateng recommendations noted.  Patient received Weld J collar.  Patient is cleared for neurosurgery.  Patient remains at intermediate risk for any cardiopulmonary complication in perioperative.  Due to history of diabetes mellitus Dabdoub.  Continue gabapentin and Robaxin use with fall precautions.  Dilaudid for pain control  PT/OT        Body mass index (BMI) of 33.0-33.9 in adult  Body mass index is 33.97 kg/m². Morbid obesity complicates all aspects of disease management from diagnostic modalities to treatment. Weight loss encouraged and health benefits explained to patient.       DM (diabetes mellitus)  Patient's FSGs are uncontrolled due to hyperglycemia on  "current medication regimen.  Last A1c reviewed-   Lab Results   Component Value Date    HGBA1C 6.5 (H) 01/25/2024     Most recent fingerstick glucose reviewed- No results for input(s): "POCTGLUCOSE" in the last 24 hours.  Current correctional scale  High  Maintain anti-hyperglycemic dose as follows-   Antihyperglycemics (From admission, onward)      Start     Stop Route Frequency Ordered    03/14/24 1819  insulin aspart U-100 pen 0-5 Units         -- SubQ Before meals & nightly PRN 03/14/24 1720          Hold Oral hypoglycemics while patient is in the hospital.    Essential hypertension  Chronic, uncontrolled. Latest blood pressure and vitals reviewed-     Temp:  [98.4 °F (36.9 °C)]   Pulse:  [64-91]   Resp:  [10-20]   BP: (135-204)/()   SpO2:  [98 %-100 %] .   Home meds for hypertension were reviewed and noted below.       While in the hospital, will manage blood pressure as follows; Continue home antihypertensive regimen    Will utilize p.r.n. blood pressure medication only if patient's blood pressure greater than 180/110 and he develops symptoms such as worsening chest pain or shortness of breath.    Cervical spinal stenosis  See above      Spinal cord compression  See above      Paresthesia and pain of both upper extremities  See above  Awaiting recommendations from Neurosurgery and Neurology      Discussed with  who confirmed from Neurosurgery, patient needs to stay in the hospital for plans of surgery early next week.  VTE Risk Mitigation (From admission, onward)           Ordered     enoxaparin injection 40 mg  Daily         03/14/24 1720     IP VTE HIGH RISK PATIENT  Once         03/14/24 1720     Place sequential compression device  Until discontinued         03/14/24 1720                    Discharge Planning   MAXIMILIANO:  3/20/24    Code Status: Full Code   Is the patient medically ready for discharge?:     Reason for patient still in hospital (select all that apply): Patient trending " condition and Consult recommendations                     Naida Godfrey MD  Department of Hospital Medicine   [facility] - Emergency Dept

## 2024-03-15 NOTE — HOSPITAL COURSE
Patient admitted to Hospital Medicine with numbness and weakness to both upper extremities and hands that began 4 days ago   He was evaluated by neurosurgeon.  Radiology imaging was consistent with C3-4 cord compression with ventral cord de formation at C4-by.  Dr. Clayton from neurosurgery recommended anterior cervical disc fusion surgery to be performed in next 3-5 days.  Patient received supportive care with gabapentin, intravenous narcotics and Robaxin.  A Keno J collar was placed. Later Neuro surgery seen and decided for fusion surgery on Tuesday and continue to hold all blood thinners  Patient underwent anterior fusion and admitted to step down unit. Pain is controlled. He is ambulating, diet modified. Patient continue to have upper extremity numbness and weakness on discharge. Cleared to be discharged with ALEXANDRU drain on per neurosurgery, follow in the office.

## 2024-03-15 NOTE — PROGRESS NOTES
formerly [facility] - Emergency Dept  Hospital Medicine  Progress Note    Patient Name: Obed Alfred  MRN: [id number]  Patient Class: OP- Observation   Admission Date: 3/14/2024  Length of Stay: 0 days  Attending Physician: Naida Godfrey MD  Primary Care Provider: Juany Robertson MD        Subjective:     Principal Problem:Weakness of both upper extremities        HPI:  53 year old male with a past medical history of HTN, HLD, DM, anemia presents to the emergency room with reports of numbness and weakness to both upper extremities and hands that began 4 days ago. Reports started in both hands than radiated to elbows. 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 Reports resting makes it better and using arms makes it worse. Denies fever, chills, recent travel, URI symptoms, GI symptoms, rash, dysuria, abdominal pain, headache, recent trauma. Started new blood pressure of chlorthalidone a week ago.   In ER, CBC and CMP unremarkable. Troponin negative x2. B12 TSH within normal limits. EKG with 1st degree AV block, no ST elevation, CT c spine: no acute fracture, degenerative changes in cervical spine,  MRI c spine: C3-C4, there is a broad-based disc protrusion asymmetric to the right, with subligamentous extruded component. This results in severe spinal canal stenosis asymmetric to the right, with cervical cord compression. There is increased non fluid-like T2 signal in the cord suggesting edema and or myelopathy. There is severe bilateral foraminal narrowing. C4-C5, there is a broad-based central disc protrusion, which contacts the ventral cervical cord and produces moderate spinal canal stenosis. There is mild-to-moderate bilateral foraminal narrowing.  C5-C6, there is broad-based disc bulging, indenting the ventral thecal sac and producing mild spinal canal stenosis.  There is moderate right neural foraminal narrowing.At C6-C7, there is mild broad-based  disc bulging and mild spinal canal stenosis, without significant foraminal narrowing. MRI brain negative for acute ischemic changes. Admit to hospital medicine with consults to Neurology and Neurosurgery. Dilaudid and Tylenol given in ER.     Overview/Hospital Course:  No notes on file    Interval History:     Review of Systems   Constitutional:  Positive for activity change and fatigue.   Neurological:  Positive for weakness and numbness.   All other systems reviewed and are negative.    Objective:     Vital Signs (Most Recent):  Temp: 98.4 °F (36.9 °C) (03/14/24 0825)  Pulse: 71 (03/15/24 0600)  Resp: 16 (03/15/24 0600)  BP: (!) 167/86 (03/15/24 0600)  SpO2: 100 % (03/15/24 0600) Vital Signs (24h Range):  Temp:  [98.4 °F (36.9 °C)] 98.4 °F (36.9 °C)  Pulse:  [64-91] 71  Resp:  [10-20] 16  SpO2:  [98 %-100 %] 100 %  BP: (135-204)/() 167/86     Weight: 104.3 kg (230 lb)  Body mass index is 33.97 kg/m².  No intake or output data in the 24 hours ending 03/15/24 0744      Physical Exam  Vitals reviewed.   Constitutional:       General: He is not in acute distress.     Appearance: Normal appearance. He is obese.   HENT:      Head: Normocephalic and atraumatic.      Mouth/Throat:      Mouth: Mucous membranes are moist.   Eyes:      Extraocular Movements: Extraocular movements intact.      Pupils: Pupils are equal, round, and reactive to light.   Cardiovascular:      Rate and Rhythm: Normal rate and regular rhythm.      Pulses: Normal pulses.      Heart sounds: Murmur heard.   Pulmonary:      Effort: Pulmonary effort is normal.      Breath sounds: Normal breath sounds.   Abdominal:      General: Bowel sounds are normal. There is no distension.      Palpations: Abdomen is soft.      Tenderness: There is no abdominal tenderness.   Musculoskeletal:      Cervical back: Normal range of motion and neck supple. No tenderness.      Right lower leg: No edema.      Left lower leg: No edema.      Comments: Positive Tinel sign,  "postive Phalen maneuver   Skin:     General: Skin is warm.   Neurological:      Mental Status: He is alert and oriented to person, place, and time.      GCS: GCS eye subscore is 4. GCS verbal subscore is 5. GCS motor subscore is 6.      Cranial Nerves: Cranial nerves 2-12 are intact.      Sensory: Sensory deficit present.      Motor: Weakness and tremor present.      Coordination: Finger-Nose-Finger Test abnormal.      Comments: Bilateral UE with decrease sensation.   Ataxia   Hand grasp weakness             Significant Labs: All pertinent labs within the past 24 hours have been reviewed.  CBC:   Recent Labs   Lab 03/14/24  1115 03/15/24  0608   WBC 6.25 6.57   HGB 10.4* 10.5*   HCT 34.0* 33.4*    263     CMP:   Recent Labs   Lab 03/14/24  1115 03/15/24  0608    137   K 4.4 4.3    104   CO2 27 26   * 114*   BUN 20 22*   CREATININE 1.4 1.4   CALCIUM 9.6 9.3   PROT 7.3 6.9   ALBUMIN 4.0 3.8   BILITOT 0.6 0.6   ALKPHOS 78 76   AST 18 15   ALT 19 18   ANIONGAP 6* 7*     Urine Studies: No results for input(s): "COLORU", "APPEARANCEUA", "PHUR", "SPECGRAV", "PROTEINUA", "GLUCUA", "KETONESU", "BILIRUBINUA", "OCCULTUA", "NITRITE", "UROBILINOGEN", "LEUKOCYTESUR", "RBCUA", "WBCUA", "BACTERIA", "SQUAMEPITHEL", "HYALINECASTS" in the last 48 hours.    Invalid input(s): "WRIGHTSUR"  Microbiology Results (last 7 days)       ** No results found for the last 168 hours. **        Significant Imaging:   CXR: No evidence of active cardiopulmonary disease.     MRI Brain: Negative for acute ischemia or acute intracranial pathology.     MRI C-spine:  1. Broad-based disc protrusion with subligamentous extruded component at C3-C4, producing severe spinal canal stenosis and cervical cord compression, with findings of cord edema and or myelopathy.  Neurosurgical consultation is recommended.  2. Additional multilevel degenerative disc disease, with spinal canal stenosis and neural foraminal narrowing as described.  3. " "Straightening of the normal cervical spinal curvature, which could be positional, or reflective of paraspinal muscular spasm.    CT C-spine:  1. No acute fracture or traumatic malalignment of the cervical spine.  2. Straightening of the normal lordotic curvature likely secondary to combination of positioning, degenerative change and/or muscle spasm.  3. Scattered degenerative change throughout the cervical spine.    Assessment/Plan:      * Weakness of both upper extremities  Ataxia present  MRI brain negative for stroke, MRI C-spine Broad-based disc protrusion with subligamentous extruded component at C3-C4, producing severe spinal canal stenosis and cervical cord compression, with findings of cord edema and or myelopathy   Neurology and Neurosurgery consulted  Dilaudid for pain control  PT/OT    C collar  Start decadron, NSAIDs    Body mass index (BMI) of 33.0-33.9 in adult  Body mass index is 33.97 kg/m². Morbid obesity complicates all aspects of disease management from diagnostic modalities to treatment. Weight loss encouraged and health benefits explained to patient.       DM (diabetes mellitus)  Patient's FSGs are uncontrolled due to hyperglycemia on current medication regimen.  Last A1c reviewed-   Lab Results   Component Value Date    HGBA1C 6.5 (H) 01/25/2024     Most recent fingerstick glucose reviewed- No results for input(s): "POCTGLUCOSE" in the last 24 hours.  Current correctional scale  High  Maintain anti-hyperglycemic dose as follows-   Antihyperglycemics (From admission, onward)      Start     Stop Route Frequency Ordered    03/14/24 1819  insulin aspart U-100 pen 0-5 Units         -- SubQ Before meals & nightly PRN 03/14/24 1720          Hold Oral hypoglycemics while patient is in the hospital.    Essential hypertension  Chronic, uncontrolled. Latest blood pressure and vitals reviewed-     Temp:  [98.4 °F (36.9 °C)]   Pulse:  [64-91]   Resp:  [10-20]   BP: (135-204)/()   SpO2:  [98 %-100 %] . "   Home meds for hypertension were reviewed and noted below.       While in the hospital, will manage blood pressure as follows; Continue home antihypertensive regimen    Will utilize p.r.n. blood pressure medication only if patient's blood pressure greater than 180/110 and he develops symptoms such as worsening chest pain or shortness of breath.    Cervical spinal stenosis  See above      Spinal cord compression  See above      Paresthesia and pain of both upper extremities  See above  Awaiting recommendations from Neurosurgery and Neurology        VTE Risk Mitigation (From admission, onward)           Ordered     enoxaparin injection 40 mg  Daily         03/14/24 1720     IP VTE HIGH RISK PATIENT  Once         03/14/24 1720     Place sequential compression device  Until discontinued         03/14/24 1720                    Discharge Planning   MAXIMILIANO:      Code Status: Full Code   Is the patient medically ready for discharge?:     Reason for patient still in hospital (select all that apply): {HMREASONPATIENTINHOSP:14592}                     Naida Godfrey MD  Department of Hospital Medicine   [facility] - Emergency Dept

## 2024-03-15 NOTE — PT/OT/SLP EVAL
Occupational Therapy   Evaluation    Name: Obed Alfred  MRN: [id number]  Admitting Diagnosis: Weakness of both upper extremities  Recent Surgery: * No surgery found *      Recommendations:     Discharge Recommendations: Low Intensity Therapy  Discharge Equipment Recommendations:  none  Barriers to discharge:  None    Assessment:     Obed Alfred is a 53 y.o. male with a medical diagnosis of Weakness of both upper extremities.  Performance deficits affecting function: weakness, impaired self care skills, impaired sensation, decreased coordination of the upper extremities.    Rehab Prognosis: Good; patient would benefit from acute skilled OT services to address these deficits and reach maximum level of function.       Plan:     Patient to be seen 3 x weekly  to address the above listed problems via self-care/home management, therapeutic activities, therapeutic exercises  Plan of Care Expires: 4/15/2024   Plan of Care Reviewed with: patient    Subjective     Chief Complaint: difficulty using upper extremities   Patient/Family Comments/goals: did not indicate     Occupational Profile:  Living Environment: lives in single story with significant other   Previous level of function: independent with self and employed as  but has been relieved of his job secondary to decreased function of the upper extremities   Equipment Used at Home: none  Assistance upon Discharge: significant other is available for assistance at discharge     Pain/Comfort:  Pain Rating 1: 0/10    Patients cultural, spiritual, Confucianist conflicts given the current situation: no    Objective:     Communicated with: nurse prior to session.  Patient found HOB elevated with blood pressure cuff, peripheral IV, pulse ox (continuous) upon OT entry to room.    General Precautions: Standard, fall   Orthopedic Precautions: spinal precautions  Braces: [place] J collar when out of bed and bilateral wrist braces  Respiratory Status: Room air    Occupational  Performance:    Bed Mobility:    Patient completed Rolling/Turning to Right with stand by assistance  Patient completed Scooting/Bridging with stand by assistance  Patient completed Supine to Sit with contact guard assistance with brace in place     Activities of Daily Living:  Feeding:  minimum assistance secondary to decreased upper body coordination   Grooming: minimum assistance     Cognitive/Visual Perceptual:  Cognitive/Psychosocial Skills:     -       Oriented to: Person, Place, Time, and Situation   -       Follows Commands/attention:Follows multistep  commands  -       Communication: clear/fluent  -       Memory: No Deficits noted  -       Safety awareness/insight to disability: intact   -       Mood/Affect/Coping skills/emotional control: Appropriate to situation and Cooperative    Physical Exam:  Upper Extremity Range of Motion:     -       Right Upper Extremity: WFL  -       Left Upper Extremity: WFL  Upper Extremity Strength:    -       Right Upper Extremity: 3-/5   -       Left Upper Extremity: 3-/5    Strength:    -       Right Upper Extremity: WFL  -       Left Upper Extremity: WFL  Fine Motor Coordination:    -       Impaired  coordination for the upper extremities with decreased sensation; wrist braces in place     AMPAC 6 Click ADL:  AMPAC Total Score: 23    Treatment & Education:  Patient was educated on importance of collar use, role of OT, discharge planning and equipment needs and reviewed use of call bell for assistance.     Patient left HOB elevated with all lines intact and call button in reach    GOALS:   Multidisciplinary Problems       Occupational Therapy Goals          Problem: Occupational Therapy    Goal Priority Disciplines Outcome Interventions   Occupational Therapy Goal     OT, PT/OT     Description: Goals to be met by: 4/15/2024     Patient will increase functional independence with ADLs by performing:    UE Dressing with Supervision.  LE Dressing with Supervision.  Grooming  while standing with Supervision.  Toileting from toilet with Supervision for hygiene and clothing management.   Bathing from  shower chair/bench with Supervision.                         History:     No past medical history on file.    No past surgical history on file.    Time Tracking:     OT Date of Treatment: 03/15/24  OT Start Time: 0920  OT Stop Time: 0940  OT Total Time (min): 20 min    Billable Minutes:Evaluation 8  Self Care/Home Management 12    3/15/2024

## 2024-03-15 NOTE — HOSPITAL COURSE
3/15:  No acute events overnight.  Afebrile.  Blood pressure 151/81.  Pulse 72.  Respirations 20.  WBC 6.57.  Hemoglobin 10.5.  Patient found in bed, awake and alert.  He reports no change in upper extremity symptoms with Robaxin and gabapentin.  He denies pain, new extremity weakness, numbness or tingling.  3/18:  No acute events overnight.  Afebrile.  Blood pressure 131/76. pulse 69.  Respirations 19.  WBC 7.23.  Hemoglobin 10.0.  Platelets 270.  Patient found in bed, awake and alert.  Today, patient reports no change in his symptoms.  Denies new extremity weakness, numbness or tingling.  3/20:  Patient underwent C3-5 ACDF on 03/20/2024.  POD 1.  Overnight, patient did have episodes of second-degree AV block type 1.  Hospitalist discontinued amlodipine, gabapentin, Robaxin and tizanidine.  No other acute events reported.  Afebrile.  Blood pressure 141/72.  Pulse 80.  Respirations 15.  WBC 6.7.  Hemoglobin 9.9.  Sodium 132.  ALEXANDRU drain in place with 23 mL of output in the last 12 hours.  Patient found in bed, awake and alert with family at bedside.  Today, he reports incisional pain in mild pain with swallowing.  Currently using bedside urinal to void.  Denies dysphagia, dysphonia, new upper extremity weakness.

## 2024-03-15 NOTE — PT/OT/SLP PROGRESS
Physical Therapy      Patient Name:  Obed Alfred   MRN:  [id number]    Patient not seen today secondary to Patient unwilling to participate, Other (Comment) (Pt reported he does not have any problems with his legs or with walking. PT will not discharge order until final plan from Neurosurgery established.). Will follow-up 3/16/24.

## 2024-03-15 NOTE — ASSESSMENT & PLAN NOTE
Mr. Obed Alfred is a 53 year old male with PMHx of HTN, Type 2 diabetes presented to [facility] ED due to bilateral hand paraesthesias and subjective upper extremity weakness. Neurosurgery consulted for spinal cord stenosis and cervical cord compression.     Neurologically stable.    Pending medical clearance, anticipate C3-5 ACDF early next week.    Discuss with Dr. Clayton.

## 2024-03-15 NOTE — SUBJECTIVE & OBJECTIVE
Interval History: 3/15:  No acute events overnight.  Afebrile.  Blood pressure 151/81.  Pulse 72.  Respirations 20.  WBC 6.57.  Hemoglobin 10.5.  Patient found in bed, awake and alert.  He reports no change in upper extremity symptoms with Robaxin and gabapentin.  He denies pain, new extremity weakness, numbness or tingling.    Medications:  Continuous Infusions:  Scheduled Meds:   amLODIPine  10 mg Oral Daily    atorvastatin  40 mg Oral Daily    chlorthalidone  25 mg Oral Daily    enoxparin  40 mg Subcutaneous Daily    gabapentin  300 mg Oral TID    methocarbamoL  750 mg Oral QID    senna-docusate 8.6-50 mg  1 tablet Oral Daily    valsartan  320 mg Oral Daily     PRN Meds:acetaminophen, acetaminophen, aluminum-magnesium hydroxide-simethicone, dextrose 50% in water (D50W), dextrose 50% in water (D50W), glucagon (human recombinant), glucose, glucose, HYDROcodone-acetaminophen, insulin aspart U-100, magnesium oxide, magnesium oxide, melatonin, naloxone, ondansetron, potassium bicarbonate, potassium bicarbonate, potassium bicarbonate, potassium, sodium phosphates, potassium, sodium phosphates, potassium, sodium phosphates, sodium chloride 0.9%     Objective:     Weight: 104.3 kg (230 lb)  Body mass index is 33.97 kg/m².  Vital Signs (Most Recent):  Temp: 98.2 °F (36.8 °C) (03/15/24 0751)  Pulse: 72 (03/15/24 0751)  Resp: 20 (03/15/24 0751)  BP: (!) 151/81 (03/15/24 0845)  SpO2: 100 % (03/15/24 0751) Vital Signs (24h Range):  Temp:  [98.2 °F (36.8 °C)] 98.2 °F (36.8 °C)  Pulse:  [64-91] 72  Resp:  [10-20] 20  SpO2:  [98 %-100 %] 100 %  BP: (135-204)/() 151/81         Neurosurgery Physical Exam  General: well developed, well nourished, no distress.   Neck: No tracheal deviation.    Neurologic: Alert and oriented. Thought content appropriate.  GCS: E4 V5 M6; Total: 15  Pulmonary: normal respirations, no signs of respiratory distress  Skin: Skin is warm, dry and intact.    Motor Strength: Moves all extremities  "spontaneously with good tone.    Bilateral upper extremity strength 5/5  Bilateral lower extremity strength 5/5       Significant Labs:  Recent Labs   Lab 03/14/24  1115 03/15/24  0608   * 114*    137   K 4.4 4.3    104   CO2 27 26   BUN 20 22*   CREATININE 1.4 1.4   CALCIUM 9.6 9.3   MG 1.6 1.5*     Recent Labs   Lab 03/14/24  1115 03/15/24  0608   WBC 6.25 6.57   HGB 10.4* 10.5*   HCT 34.0* 33.4*    263     No results for input(s): "LABPT", "INR", "APTT" in the last 48 hours.  Microbiology Results (last 7 days)       ** No results found for the last 168 hours. **            "

## 2024-03-15 NOTE — ASSESSMENT & PLAN NOTE
Chronic, uncontrolled. Latest blood pressure and vitals reviewed-     Temp:  [98.4 °F (36.9 °C)]   Pulse:  [64-91]   Resp:  [10-20]   BP: (135-204)/()   SpO2:  [98 %-100 %] .   Home meds for hypertension were reviewed and noted below.       While in the hospital, will manage blood pressure as follows; Continue home antihypertensive regimen    Will utilize p.r.n. blood pressure medication only if patient's blood pressure greater than 180/110 and he develops symptoms such as worsening chest pain or shortness of breath.

## 2024-03-16 LAB
ALBUMIN SERPL BCP-MCNC: 4 G/DL (ref 3.5–5.2)
ALP SERPL-CCNC: 72 U/L (ref 55–135)
ALT SERPL W/O P-5'-P-CCNC: 18 U/L (ref 10–44)
ANION GAP SERPL CALC-SCNC: 7 MMOL/L (ref 8–16)
AST SERPL-CCNC: 15 U/L (ref 10–40)
BASOPHILS # BLD AUTO: 0.03 K/UL (ref 0–0.2)
BASOPHILS NFR BLD: 0.5 % (ref 0–1.9)
BILIRUB SERPL-MCNC: 0.6 MG/DL (ref 0.1–1)
BUN SERPL-MCNC: 21 MG/DL (ref 6–20)
CALCIUM SERPL-MCNC: 9.5 MG/DL (ref 8.7–10.5)
CHLORIDE SERPL-SCNC: 102 MMOL/L (ref 95–110)
CO2 SERPL-SCNC: 28 MMOL/L (ref 23–29)
CREAT SERPL-MCNC: 1.5 MG/DL (ref 0.5–1.4)
DIFFERENTIAL METHOD BLD: ABNORMAL
EOSINOPHIL # BLD AUTO: 0.1 K/UL (ref 0–0.5)
EOSINOPHIL NFR BLD: 1.7 % (ref 0–8)
ERYTHROCYTE [DISTWIDTH] IN BLOOD BY AUTOMATED COUNT: 12.4 % (ref 11.5–14.5)
EST. GFR  (NO RACE VARIABLE): 54.6 ML/MIN/1.73 M^2
GLUCOSE SERPL-MCNC: 110 MG/DL (ref 70–110)
GLUCOSE SERPL-MCNC: 125 MG/DL (ref 70–110)
GLUCOSE SERPL-MCNC: 146 MG/DL (ref 70–110)
GLUCOSE SERPL-MCNC: 172 MG/DL (ref 70–110)
GLUCOSE SERPL-MCNC: 210 MG/DL (ref 70–110)
GLUCOSE SERPL-MCNC: 211 MG/DL (ref 70–110)
HCT VFR BLD AUTO: 36.6 % (ref 40–54)
HGB BLD-MCNC: 11.2 G/DL (ref 14–18)
IMM GRANULOCYTES # BLD AUTO: 0.05 K/UL (ref 0–0.04)
IMM GRANULOCYTES NFR BLD AUTO: 0.8 % (ref 0–0.5)
LYMPHOCYTES # BLD AUTO: 2.7 K/UL (ref 1–4.8)
LYMPHOCYTES NFR BLD: 42.4 % (ref 18–48)
MAGNESIUM SERPL-MCNC: 1.8 MG/DL (ref 1.6–2.6)
MCH RBC QN AUTO: 25.5 PG (ref 27–31)
MCHC RBC AUTO-ENTMCNC: 30.6 G/DL (ref 32–36)
MCV RBC AUTO: 83 FL (ref 82–98)
MONOCYTES # BLD AUTO: 0.5 K/UL (ref 0.3–1)
MONOCYTES NFR BLD: 7.4 % (ref 4–15)
NEUTROPHILS # BLD AUTO: 3 K/UL (ref 1.8–7.7)
NEUTROPHILS NFR BLD: 47.2 % (ref 38–73)
NRBC BLD-RTO: 0 /100 WBC
PLATELET # BLD AUTO: 302 K/UL (ref 150–450)
PMV BLD AUTO: 9.8 FL (ref 9.2–12.9)
POTASSIUM SERPL-SCNC: 4.2 MMOL/L (ref 3.5–5.1)
PROT SERPL-MCNC: 7.2 G/DL (ref 6–8.4)
RBC # BLD AUTO: 4.39 M/UL (ref 4.6–6.2)
SODIUM SERPL-SCNC: 137 MMOL/L (ref 136–145)
WBC # BLD AUTO: 6.39 K/UL (ref 3.9–12.7)

## 2024-03-16 PROCEDURE — 83735 ASSAY OF MAGNESIUM: CPT | Performed by: NURSE PRACTITIONER

## 2024-03-16 PROCEDURE — 36415 COLL VENOUS BLD VENIPUNCTURE: CPT | Performed by: NURSE PRACTITIONER

## 2024-03-16 PROCEDURE — 25000003 PHARM REV CODE 250: Performed by: NURSE PRACTITIONER

## 2024-03-16 PROCEDURE — 80053 COMPREHEN METABOLIC PANEL: CPT | Performed by: NURSE PRACTITIONER

## 2024-03-16 PROCEDURE — 63600175 PHARM REV CODE 636 W HCPCS: Performed by: NURSE PRACTITIONER

## 2024-03-16 PROCEDURE — 85025 COMPLETE CBC W/AUTO DIFF WBC: CPT | Performed by: NURSE PRACTITIONER

## 2024-03-16 PROCEDURE — 96372 THER/PROPH/DIAG INJ SC/IM: CPT | Performed by: NURSE PRACTITIONER

## 2024-03-16 PROCEDURE — 12000002 HC ACUTE/MED SURGE SEMI-PRIVATE ROOM

## 2024-03-16 PROCEDURE — 99232 SBSQ HOSP IP/OBS MODERATE 35: CPT | Mod: ,,, | Performed by: NEUROLOGICAL SURGERY

## 2024-03-16 RX ADMIN — METHOCARBAMOL TABLETS 750 MG: 750 TABLET, COATED ORAL at 09:03

## 2024-03-16 RX ADMIN — AMLODIPINE BESYLATE 10 MG: 5 TABLET ORAL at 09:03

## 2024-03-16 RX ADMIN — METHOCARBAMOL TABLETS 750 MG: 750 TABLET, COATED ORAL at 01:03

## 2024-03-16 RX ADMIN — METHOCARBAMOL TABLETS 750 MG: 750 TABLET, COATED ORAL at 04:03

## 2024-03-16 RX ADMIN — GABAPENTIN 300 MG: 300 CAPSULE ORAL at 09:03

## 2024-03-16 RX ADMIN — SENNOSIDES AND DOCUSATE SODIUM 1 TABLET: 8.6; 5 TABLET ORAL at 09:03

## 2024-03-16 RX ADMIN — GABAPENTIN 300 MG: 300 CAPSULE ORAL at 04:03

## 2024-03-16 RX ADMIN — INSULIN ASPART 2 UNITS: 100 INJECTION, SOLUTION INTRAVENOUS; SUBCUTANEOUS at 06:03

## 2024-03-16 RX ADMIN — ENOXAPARIN SODIUM 40 MG: 100 INJECTION SUBCUTANEOUS at 04:03

## 2024-03-16 RX ADMIN — VALSARTAN 320 MG: 80 TABLET, FILM COATED ORAL at 09:03

## 2024-03-16 RX ADMIN — CHLORTHALIDONE 25 MG: 25 TABLET ORAL at 09:03

## 2024-03-16 RX ADMIN — ATORVASTATIN CALCIUM 40 MG: 40 TABLET, FILM COATED ORAL at 09:03

## 2024-03-16 NOTE — ASSESSMENT & PLAN NOTE
Body mass index is 44.89 kg/m². Morbid obesity complicates all aspects of disease management from diagnostic modalities to treatment. Weight loss encouraged and health benefits explained to patient.

## 2024-03-16 NOTE — PT/OT/SLP PROGRESS
"Physical Therapy      Patient Name:  Obed Alfred   MRN:  [id number]    Patient not seen today secondary to  (Pt reported no problem with ambulation . "I 've been walking," in the room.  Pt to have cervical fusion on Tuesday. Will DC PT order for now. Will await post procedure order.     "

## 2024-03-16 NOTE — ASSESSMENT & PLAN NOTE
Ataxia present  MRI brain negative for stroke, MRI C-spine Broad-based disc protrusion with subligamentous extruded component at C3-C4, producing severe spinal canal stenosis and cervical cord compression, with findings of cord edema and or myelopathy   Neurology and Neurosurgery consulted.   For surgical intervention Tuesday  Continue gabapentin and Robaxin use with fall precautions.  C collar   Dilaudid for pain control  PT/OT  Add  steroid if needed

## 2024-03-16 NOTE — PROGRESS NOTES
[facility] Medicine  Progress Note    Patient Name: Obed Alfred  MRN: [id number]  Patient Class: OP- Observation   Admission Date: 3/14/2024  Length of Stay: 0 days  Attending Physician: Theo Diaz MD  Primary Care Provider: Juany Robertson MD        Subjective:     Principal Problem:Weakness of both upper extremities        HPI:  53 year old male with a past medical history of HTN, HLD, DM, anemia presents to the emergency room with reports of numbness and weakness to both upper extremities and hands that began 4 days ago. Reports started in both hands than radiated to elbows. 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 Reports resting makes it better and using arms makes it worse. Denies fever, chills, recent travel, URI symptoms, GI symptoms, rash, dysuria, abdominal pain, headache, recent trauma. Started new blood pressure of chlorthalidone a week ago.   In ER, CBC and CMP unremarkable. Troponin negative x2. B12 TSH within normal limits. EKG with 1st degree AV block, no ST elevation, CT c spine: no acute fracture, degenerative changes in cervical spine,  MRI c spine: C3-C4, there is a broad-based disc protrusion asymmetric to the right, with subligamentous extruded component. This results in severe spinal canal stenosis asymmetric to the right, with cervical cord compression. There is increased non fluid-like T2 signal in the cord suggesting edema and or myelopathy. There is severe bilateral foraminal narrowing. C4-C5, there is a broad-based central disc protrusion, which contacts the ventral cervical cord and produces moderate spinal canal stenosis. There is mild-to-moderate bilateral foraminal narrowing.  C5-C6, there is broad-based disc bulging, indenting the ventral thecal sac and producing mild spinal canal stenosis.  There is moderate right neural foraminal narrowing.At C6-C7, there is mild broad-based disc bulging  and mild spinal canal stenosis, without significant foraminal narrowing. MRI brain negative for acute ischemic changes. Admit to hospital medicine with consults to Neurology and Neurosurgery. Dilaudid and Tylenol given in ER.     Overview/Hospital Course:  Patient admitted to Hospital Medicine and was evaluated by neurosurgeon.  Radiology imaging was consistent with C3-4 cord compression with ventral cord de formation at C4-by.  Dr. Clayton from neurosurgery recommended anterior cervical disc fusion surgery to be performed in next 3-5 days.  Patient received supportive care with gabapentin, intravenous narcotics and Robaxin.  A [place] J collar was placed.    Interval History:   Patient is sitting chair, he turn head  sideways but no pain reported    Review of Systems  Objective:     Vital Signs (Most Recent):  Temp: 98.1 °F (36.7 °C) (03/16/24 1146)  Pulse: 70 (03/16/24 1146)  Resp: 18 (03/16/24 1146)  BP: 130/79 (03/16/24 1146)  SpO2: 100 % (03/16/24 1146) Vital Signs (24h Range):  Temp:  [97.5 °F (36.4 °C)-98.7 °F (37.1 °C)] 98.1 °F (36.7 °C)  Pulse:  [63-79] 70  Resp:  [18-20] 18  SpO2:  [97 %-100 %] 100 %  BP: (124-159)/(66-88) 130/79     Weight: (!) 137.9 kg (304 lb)  Body mass index is 44.89 kg/m².    Intake/Output Summary (Last 24 hours) at 3/16/2024 1406  Last data filed at 3/16/2024 1050  Gross per 24 hour   Intake 240 ml   Output --   Net 240 ml         Physical Exam  Constitutional:       General: He is not in acute distress.     Appearance: He is well-developed.   HENT:      Head: Normocephalic.   Eyes:      Pupils: Pupils are equal, round, and reactive to light.   Cardiovascular:      Rate and Rhythm: Normal rate and regular rhythm.   Pulmonary:      Effort: Pulmonary effort is normal. No respiratory distress.   Abdominal:      General: Abdomen is flat. There is no distension.      Tenderness: There is no abdominal tenderness.   Musculoskeletal:      Cervical back: Neck supple.   Skin:     General: Skin is  warm.      Findings: No rash.   Neurological:      Mental Status: He is alert and oriented to person, place, and time.   Psychiatric:         Mood and Affect: Mood normal.             Significant Labs: All pertinent labs within the past 24 hours have been reviewed.  BMP:   Recent Labs   Lab 03/16/24  0736   *      K 4.2      CO2 28   BUN 21*   CREATININE 1.5*   CALCIUM 9.5   MG 1.8     CBC:   Recent Labs   Lab 03/15/24  0608 03/16/24  0736   WBC 6.57 6.39   HGB 10.5* 11.2*   HCT 33.4* 36.6*    302     CMP:   Recent Labs   Lab 03/15/24  0608 03/16/24  0736    137   K 4.3 4.2    102   CO2 26 28   * 125*   BUN 22* 21*   CREATININE 1.4 1.5*   CALCIUM 9.3 9.5   PROT 6.9 7.2   ALBUMIN 3.8 4.0   BILITOT 0.6 0.6   ALKPHOS 76 72   AST 15 15   ALT 18 18   ANIONGAP 7* 7*       Significant Imaging: I have reviewed all pertinent imaging results/findings within the past 24 hours.    Assessment/Plan:      * Weakness of both upper extremities  Ataxia present  MRI brain negative for stroke, MRI C-spine Broad-based disc protrusion with subligamentous extruded component at C3-C4, producing severe spinal canal stenosis and cervical cord compression, with findings of cord edema and or myelopathy   Neurology and Neurosurgery consulted.   For surgical intervention Tuesday  Continue gabapentin and Robaxin use with fall precautions.  C collar   Dilaudid for pain control  PT/OT  Add  steroid if needed        Body mass index (BMI) of 33.0-33.9 in adult  Body mass index is 44.89 kg/m². Morbid obesity complicates all aspects of disease management from diagnostic modalities to treatment. Weight loss encouraged and health benefits explained to patient.       DM (diabetes mellitus)  Patient's FSGs are uncontrolled due to hyperglycemia on current medication regimen.  Last A1c reviewed-   Lab Results   Component Value Date    HGBA1C 6.5 (H) 01/25/2024     Most recent fingerstick glucose reviewed- No  "results for input(s): "POCTGLUCOSE" in the last 24 hours.  Current correctional scale  High  Maintain anti-hyperglycemic dose as follows-   Antihyperglycemics (From admission, onward)      Start     Stop Route Frequency Ordered    03/14/24 1819  insulin aspart U-100 pen 0-5 Units         -- SubQ Before meals & nightly PRN 03/14/24 1720          Hold Oral hypoglycemics while patient is in the hospital.    Essential hypertension  Chronic, uncontrolled. Latest blood pressure and vitals reviewed-     Temp:  [97.5 °F (36.4 °C)-98.7 °F (37.1 °C)]   Pulse:  [63-79]   Resp:  [18-20]   BP: (124-159)/(66-88)   SpO2:  [97 %-100 %] .   Home meds for hypertension were reviewed and noted below.       While in the hospital, will manage blood pressure as follows; Continue home antihypertensive regimen    Will utilize p.r.n. blood pressure medication only if patient's blood pressure greater than 180/110 and he develops symptoms such as worsening chest pain or shortness of breath.    Cervical spinal stenosis  See above      Spinal cord compression  See above      Paresthesia and pain of both upper extremities  See above  For surgical intervention on Tuesday        VTE Risk Mitigation (From admission, onward)           Ordered     enoxaparin injection 40 mg  Daily         03/14/24 1720     IP VTE HIGH RISK PATIENT  Once         03/14/24 1720     Place sequential compression device  Until discontinued         03/14/24 1720                    Discharge Planning   MAXIMILIANO:      Code Status: Full Code   Is the patient medically ready for discharge?:     Reason for patient still in hospital (select all that apply): Treatment                     Theo Diaz MD  Department of Hospital Medicine   [facility]    "

## 2024-03-16 NOTE — PROGRESS NOTES
This is to document that the patient should be admitted inpatient due to his worsening myelopathy symptoms. His cervical spine MRI is showing cord edema.     Patient surgery is scheduled this Tuesday. Discharging the patient now could increase the risk of irreversible loss of function and/or worsening of his condition.     Sadiq Qureshi MD

## 2024-03-16 NOTE — PLAN OF CARE
Formerly [facility], later [facility]  Initial Discharge Assessment       Primary Care Provider: Juany Robertson MD    Admission Diagnosis: Spinal cord compression [G95.20]    Admission Date: 3/14/2024  Expected Discharge Date:     Transition of Care Barriers: None    Payor: MEDICAID / Plan: [place] [employer] / Product Type: Managed Medicaid /     Extended Emergency Contact Information  Primary Emergency Contact: Angela Anderson  Mobile Phone: [phone]  Relation: Friend  Preferred language: English   needed? No    Discharge Plan A: Home with family  Discharge Plan B: Home with family      [id number] [place], [place] - [address] & [facility]  [address] [facility] [address]  Phone: [phone] Fax: [phone]        CM completed discharge assessment with patient. Pt AAOx4s. Pt lives with SO. Demographics, PCP, and insurance verified. No home health. No dialysis. Pt completes ADLs without assistance. Pt to discharge home via family transport. Pt has no other needs to be addressed at this time.    Initial Assessment (most recent)       Adult Discharge Assessment - 03/16/24 1522          Discharge Assessment    Assessment Type Discharge Planning Assessment     Confirmed/corrected address, phone number and insurance Yes     Confirmed Demographics Correct on Facesheet     Source of Information patient     When was your last doctors appointment? --   unsure    Does patient/caregiver understand observation status Yes     Communicated MAXIMILIANO with patient/caregiver Date not available/Unable to determine     Reason For Admission Weakness of both upper extremities     People in Home significant other     Do you expect to return to your current living situation? Yes     Do you have help at home or someone to help you manage your care at home? Yes     Who are your caregiver(s) and their phone number(s)? Kojo Mallory () [phone]     Prior to hospitilization cognitive status:  Alert/Oriented     Current cognitive status: Alert/Oriented     Walking or Climbing Stairs Difficulty no     Dressing/Bathing Difficulty no     Home Layout Able to live on 1st floor     Equipment Currently Used at Home none     Patient currently being followed by outpatient case management? No     Do you currently have service(s) that help you manage your care at home? No     Do you take prescription medications? Yes     Do you have prescription coverage? Yes     Coverage [place] [employer]     Do you have any problems affording any of your prescribed medications? No     Is the patient taking medications as prescribed? yes     Who is going to help you get home at discharge? SO     How do you get to doctors appointments? car, drives self     Are you on dialysis? No     Do you take coumadin? No     Discharge Plan A Home with family     Discharge Plan B Home with family     DME Needed Upon Discharge  none     Discharge Plan discussed with: Patient     Transition of Care Barriers None

## 2024-03-16 NOTE — PROGRESS NOTES
NEUROSURGICAL PROGRESS NOTE    DATE OF SERVICE:  03/16/2024    ATTENDING PHYSICIAN:  Sadiq Qureshi MD    SUBJECTIVE:  03/14/24    Mr. Obed Alfred is a 53 year old male with PMHx of HTN, Type 2 diabetes presented to [facility] ED due to bilateral hand paraesthesias and subjective upper extremity weakness. Subjective left arm weakness greater than right. Patient reported approximately 1 month ago he noticed he was having right sided cervical pain that radiated to his right shoulder, anterior upper arm forearm and fingers with cervical extension. He was also recently fired from his job as a  because he had difficulty lifting plates. He reports imbalance, hand clumsiness, and difficulty with zippers. Denies falls, bladder/bowel dysfunction, cervical LILIAN, or cervical surgery.      MRI cervical obtained today revealed C3-4 disc protrusion resulting in severe spinal canal stenosis.     Neurosurgery consulted for spinal cord stenosis and cervical cord compression.     INTERIM HISTORY:    Patient is complaining of worsening hand numbness and weakness.  Loss of dexterity over the last few weeks.  His symptoms have been stable since his admission.  He denies having significant gait imbalance.  Does not report sphincter dysfunction symptoms.              PAST MEDICAL HISTORY:  Active Ambulatory Problems     Diagnosis Date Noted    No Active Ambulatory Problems     Resolved Ambulatory Problems     Diagnosis Date Noted    No Resolved Ambulatory Problems     No Additional Past Medical History       PAST SURGICAL HISTORY:  No past surgical history on file.    SOCIAL HISTORY:   Social History     Socioeconomic History    Marital status: Single       FAMILY HISTORY:  No family history on file.    CURRENTS MEDICATIONS:  No current facility-administered medications on file prior to encounter.     Current Outpatient Medications on File Prior to Encounter   Medication Sig Dispense Refill    amLODIPine (NORVASC) 10 MG tablet Take  1 tablet by mouth once daily.      atorvastatin (LIPITOR) 40 MG tablet Take 40 mg by mouth once daily.      chlorthalidone (HYGROTEN) 25 MG Tab Take 1 tablet by mouth once daily.      valsartan (DIOVAN) 320 MG tablet Take 1 tablet by mouth once daily.         ALLERGIES:  Review of patient's allergies indicates:  No Known Allergies    REVIEW OF SYSTEMS:  ROS      OBJECTIVE:    PHYSICAL EXAMINATION:   Vitals:    03/16/24 0947   BP: (!) 146/84   Pulse:    Resp:    Temp:        Physical Exam:  Vitals reviewed.    Constitutional: He appears well-developed and well-nourished.     Eyes: Pupils are equal, round, and reactive to light. Conjunctivae and EOM are normal.     Cardiovascular: Normal distal pulses and no edema.     Abdominal: Soft.     Skin: Skin displays no rash on trunk and no rash on extremities. Skin displays no lesions on trunk and no lesions on extremities.     Psych/Behavior: He is alert. He is oriented to person, place, and time. He has a normal mood and affect.     Musculoskeletal:        Neck: Range of motion is limited.     Neurological:        DTRs: Tricep reflexes are 0 on the right side and 0 on the left side. Bicep reflexes are 0 on the right side and 0 on the left side. Brachioradialis reflexes are 0 on the right side and 0 on the left side. Patellar reflexes are 0 on the right side and 0 on the left side. Achilles reflexes are 0 on the right side and 0 on the left side.       Back Exam     Muscle Strength   Right Quadriceps:  5/5   Left Quadriceps:  5/5   Right Hamstrings:  5/5   Left Hamstrings:  5/5             SI joint:   Palpation at the right and left SI joints not painful  ROSA test is negative bilaterally  Gaenslen test is negative bilaterally  Thigh thrust test is negative bilaterally    Neurologic Exam     Mental Status   Oriented to person, place, and time.   Speech: speech is normal   Level of consciousness: alert    Cranial Nerves   Cranial nerves II through XII intact.     CN III,  IV, VI   Pupils are equal, round, and reactive to light.  Extraocular motions are normal.     Motor Exam   Muscle bulk: normal  Overall muscle tone: normal    Strength   Right deltoid: 5/5  Left deltoid: 5/5  Right biceps: 5/5  Left biceps: 5/5  Right triceps: 5/5  Left triceps: 5/5  Right wrist flexion: 5/5  Left wrist flexion: 5/5  Right wrist extension: 5/5  Left wrist extension: 5/5  Right interossei: 5/5  Left interossei: 5/5  Right iliopsoas: 5/5  Left iliopsoas: 5/5  Right quadriceps: 5/5  Left quadriceps: 5/5  Right hamstrin/5  Left hamstrin/5  Right anterior tibial: 5/5  Left anterior tibial: 5/5  Right posterior tibial: 5/5  Left posterior tibial: 5/5  Right peroneal: 5/5  Left peroneal: 5/5  Right gastroc: 5/5  Left gastroc: 5/5    Sensory Exam   Light touch normal.   Pinprick normal.     Gait, Coordination, and Reflexes     Gait  Gait: (slightly spastic)    Coordination   Finger to nose coordination: normal  Tandem gait test: difficult.    Reflexes   Right brachioradialis: 0  Left brachioradialis: 0  Right biceps: 0  Left biceps: 0  Right triceps: 0  Left triceps: 0  Right patellar: 0  Left patellar: 0  Right achilles: 0  Left achilles: 0  Right plantar: normal  Left plantar: normal  Right [place]: absent  Left Tabor: absent  Right ankle clonus: absent  Left ankle clonus: absent        DIAGNOSTIC DATA:  I personally interpreted the following imaging:   Cervical spine MRI reviewed showing severe central canal stenosis at C3-4 and C4-5    ASSESSMENT:  This is a 53 y.o. male with     Problem List Items Addressed This Visit          Neuro    Spinal cord compression - Primary    Cervical spinal stenosis     Other Visit Diagnoses       Chest pain        Relevant Orders    EKG 12-lead (Completed)    EKG 12-lead    Spinal stenosis, cervical region        Relevant Orders    CT Cervical Spine Without Contrast (Completed)    Left upper extremity numbness        Right upper extremity numbness               Cervical spondylosis with myelopathy    PLAN:  Due to his worsening myelopathy symptoms the patient has been admitted for surgical intervention.  And instrumented fusion is planned possibly on Tuesday.  Appreciating hospital medicine medical clearance.  All questions answered.        Sadiq Qureshi MD  Cell:[phone]

## 2024-03-16 NOTE — ASSESSMENT & PLAN NOTE
Chronic, uncontrolled. Latest blood pressure and vitals reviewed-     Temp:  [97.5 °F (36.4 °C)-98.7 °F (37.1 °C)]   Pulse:  [63-79]   Resp:  [18-20]   BP: (124-159)/(66-88)   SpO2:  [97 %-100 %] .   Home meds for hypertension were reviewed and noted below.       While in the hospital, will manage blood pressure as follows; Continue home antihypertensive regimen    Will utilize p.r.n. blood pressure medication only if patient's blood pressure greater than 180/110 and he develops symptoms such as worsening chest pain or shortness of breath.

## 2024-03-16 NOTE — PLAN OF CARE
Problem: Adult Inpatient Plan of Care  Goal: Plan of Care Review  Outcome: Ongoing, Progressing  Goal: Patient-Specific Goal (Individualized)  Outcome: Ongoing, Progressing  Goal: Absence of Hospital-Acquired Illness or Injury  Outcome: Ongoing, Progressing  Goal: Optimal Comfort and Wellbeing  Outcome: Ongoing, Progressing  Goal: Readiness for Transition of Care  Outcome: Ongoing, Progressing     Problem: Diabetes Comorbidity  Goal: Blood Glucose Level Within Targeted Range  Outcome: Ongoing, Progressing     Problem: Fall Injury Risk  Goal: Absence of Fall and Fall-Related Injury  Outcome: Ongoing, Progressing     Problem: Bariatric Environmental Safety  Goal: Safety Maintained with Care  Outcome: Ongoing, Progressing

## 2024-03-17 LAB
ALBUMIN SERPL BCP-MCNC: 3.7 G/DL (ref 3.5–5.2)
ALP SERPL-CCNC: 71 U/L (ref 55–135)
ALT SERPL W/O P-5'-P-CCNC: 19 U/L (ref 10–44)
ANION GAP SERPL CALC-SCNC: 4 MMOL/L (ref 8–16)
AST SERPL-CCNC: 16 U/L (ref 10–40)
BASOPHILS # BLD AUTO: 0.03 K/UL (ref 0–0.2)
BASOPHILS NFR BLD: 0.5 % (ref 0–1.9)
BILIRUB SERPL-MCNC: 0.5 MG/DL (ref 0.1–1)
BUN SERPL-MCNC: 30 MG/DL (ref 6–20)
CALCIUM SERPL-MCNC: 8.8 MG/DL (ref 8.7–10.5)
CHLORIDE SERPL-SCNC: 104 MMOL/L (ref 95–110)
CO2 SERPL-SCNC: 27 MMOL/L (ref 23–29)
CREAT SERPL-MCNC: 1.7 MG/DL (ref 0.5–1.4)
DIFFERENTIAL METHOD BLD: ABNORMAL
EOSINOPHIL # BLD AUTO: 0.2 K/UL (ref 0–0.5)
EOSINOPHIL NFR BLD: 2.7 % (ref 0–8)
ERYTHROCYTE [DISTWIDTH] IN BLOOD BY AUTOMATED COUNT: 12.4 % (ref 11.5–14.5)
EST. GFR  (NO RACE VARIABLE): 47 ML/MIN/1.73 M^2
GLUCOSE SERPL-MCNC: 124 MG/DL (ref 70–110)
GLUCOSE SERPL-MCNC: 134 MG/DL (ref 70–110)
GLUCOSE SERPL-MCNC: 198 MG/DL (ref 70–110)
GLUCOSE SERPL-MCNC: 202 MG/DL (ref 70–110)
HCT VFR BLD AUTO: 34.8 % (ref 40–54)
HGB BLD-MCNC: 10.5 G/DL (ref 14–18)
IMM GRANULOCYTES # BLD AUTO: 0.04 K/UL (ref 0–0.04)
IMM GRANULOCYTES NFR BLD AUTO: 0.6 % (ref 0–0.5)
LYMPHOCYTES # BLD AUTO: 2.4 K/UL (ref 1–4.8)
LYMPHOCYTES NFR BLD: 38.3 % (ref 18–48)
MAGNESIUM SERPL-MCNC: 1.8 MG/DL (ref 1.6–2.6)
MCH RBC QN AUTO: 25.3 PG (ref 27–31)
MCHC RBC AUTO-ENTMCNC: 30.2 G/DL (ref 32–36)
MCV RBC AUTO: 84 FL (ref 82–98)
MONOCYTES # BLD AUTO: 0.6 K/UL (ref 0.3–1)
MONOCYTES NFR BLD: 8.8 % (ref 4–15)
NEUTROPHILS # BLD AUTO: 3.1 K/UL (ref 1.8–7.7)
NEUTROPHILS NFR BLD: 49.1 % (ref 38–73)
NRBC BLD-RTO: 0 /100 WBC
PLATELET # BLD AUTO: 278 K/UL (ref 150–450)
PMV BLD AUTO: 9.5 FL (ref 9.2–12.9)
POTASSIUM SERPL-SCNC: 4.3 MMOL/L (ref 3.5–5.1)
PROT SERPL-MCNC: 6.9 G/DL (ref 6–8.4)
RBC # BLD AUTO: 4.15 M/UL (ref 4.6–6.2)
SODIUM SERPL-SCNC: 135 MMOL/L (ref 136–145)
WBC # BLD AUTO: 6.35 K/UL (ref 3.9–12.7)

## 2024-03-17 PROCEDURE — 83735 ASSAY OF MAGNESIUM: CPT | Performed by: NURSE PRACTITIONER

## 2024-03-17 PROCEDURE — 25000003 PHARM REV CODE 250: Performed by: NURSE PRACTITIONER

## 2024-03-17 PROCEDURE — 99232 SBSQ HOSP IP/OBS MODERATE 35: CPT | Mod: ,,, | Performed by: NEUROLOGICAL SURGERY

## 2024-03-17 PROCEDURE — 63600175 PHARM REV CODE 636 W HCPCS: Performed by: INTERNAL MEDICINE

## 2024-03-17 PROCEDURE — 36415 COLL VENOUS BLD VENIPUNCTURE: CPT | Performed by: NURSE PRACTITIONER

## 2024-03-17 PROCEDURE — 80053 COMPREHEN METABOLIC PANEL: CPT | Performed by: NURSE PRACTITIONER

## 2024-03-17 PROCEDURE — 85025 COMPLETE CBC W/AUTO DIFF WBC: CPT | Performed by: NURSE PRACTITIONER

## 2024-03-17 PROCEDURE — 12000002 HC ACUTE/MED SURGE SEMI-PRIVATE ROOM

## 2024-03-17 RX ORDER — ENOXAPARIN SODIUM 100 MG/ML
40 INJECTION SUBCUTANEOUS EVERY 12 HOURS
Status: DISCONTINUED | OUTPATIENT
Start: 2024-03-17 | End: 2024-03-18

## 2024-03-17 RX ADMIN — GABAPENTIN 300 MG: 300 CAPSULE ORAL at 02:03

## 2024-03-17 RX ADMIN — ENOXAPARIN SODIUM 40 MG: 100 INJECTION SUBCUTANEOUS at 08:03

## 2024-03-17 RX ADMIN — AMLODIPINE BESYLATE 10 MG: 5 TABLET ORAL at 09:03

## 2024-03-17 RX ADMIN — GABAPENTIN 300 MG: 300 CAPSULE ORAL at 08:03

## 2024-03-17 RX ADMIN — CHLORTHALIDONE 25 MG: 25 TABLET ORAL at 09:03

## 2024-03-17 RX ADMIN — METHOCARBAMOL TABLETS 750 MG: 750 TABLET, COATED ORAL at 08:03

## 2024-03-17 RX ADMIN — METHOCARBAMOL TABLETS 750 MG: 750 TABLET, COATED ORAL at 09:03

## 2024-03-17 RX ADMIN — VALSARTAN 320 MG: 80 TABLET, FILM COATED ORAL at 09:03

## 2024-03-17 RX ADMIN — METHOCARBAMOL TABLETS 750 MG: 750 TABLET, COATED ORAL at 05:03

## 2024-03-17 RX ADMIN — SENNOSIDES AND DOCUSATE SODIUM 1 TABLET: 8.6; 5 TABLET ORAL at 09:03

## 2024-03-17 RX ADMIN — METHOCARBAMOL TABLETS 750 MG: 750 TABLET, COATED ORAL at 02:03

## 2024-03-17 RX ADMIN — ATORVASTATIN CALCIUM 40 MG: 40 TABLET, FILM COATED ORAL at 09:03

## 2024-03-17 RX ADMIN — GABAPENTIN 300 MG: 300 CAPSULE ORAL at 09:03

## 2024-03-17 NOTE — PROGRESS NOTES
Pharmacist Renal Dose Adjustment Note    Obed Alfred is a 53 y.o. male being treated with the medication Enoxaparin    Patient Data:    Vital Signs (Most Recent):  Temp: 98.7 °F (37.1 °C) (03/17/24 0735)  Pulse: 65 (03/17/24 0735)  Resp: 18 (03/17/24 0735)  BP: 122/86 (03/17/24 0948)  SpO2: 97 % (03/17/24 0735) Vital Signs (72h Range):  Temp:  [97.5 °F (36.4 °C)-98.9 °F (37.2 °C)]   Pulse:  [63-91]   Resp:  [10-20]   BP: (122-204)/()   SpO2:  [97 %-100 %]      Recent Labs   Lab 03/15/24  0608 03/16/24  0736 03/17/24  0800   CREATININE 1.4 1.5* 1.7*     Serum creatinine: 1.7 mg/dL (H) 03/17/24 0800  Estimated creatinine clearance: 67.8 mL/min (A)    Medication: Enoxaparin 40 mg QD changed to Enoxaparin 40 mg BID per Renal Dosing Protocol: BMI >40 kg/m2    Pharmacist's Name: Rosita Kilgore  Pharmacist's Extension: [phone]

## 2024-03-17 NOTE — PROVIDER PROGRESS NOTES - EMERGENCY DEPT.
NEUROSURGICAL PROGRESS NOTE    DATE OF SERVICE:  03/17/2024    ATTENDING PHYSICIAN:  Sadiq Qureshi MD    SUBJECTIVE:  03/14/24     Mr. Obed Alfred is a 53 year old male with PMHx of HTN, Type 2 diabetes presented to [facility] ED due to bilateral hand paraesthesias and subjective upper extremity weakness. Subjective left arm weakness greater than right. Patient reported approximately 1 month ago he noticed he was having right sided cervical pain that radiated to his right shoulder, anterior upper arm forearm and fingers with cervical extension. He was also recently fired from his job as a  because he had difficulty lifting plates. He reports imbalance, hand clumsiness, and difficulty with zippers. Denies falls, bladder/bowel dysfunction, cervical LILIAN, or cervical surgery.      MRI cervical obtained today revealed C3-4 disc protrusion resulting in severe spinal canal stenosis.     Neurosurgery consulted for spinal cord stenosis and cervical cord compression.      INTERIM HISTORY:   03/16/24  Patient is complaining of worsening hand numbness and weakness.  Loss of dexterity over the last few weeks.  His symptoms have been stable since his admission.  He denies having significant gait imbalance.  Does not report sphincter dysfunction symptoms.    03/17/24  Patient is complaining of similar symptoms from yesterday, bilateral hand numbness and weakness.  He is dropping things.  Has been mobilizing well.             PAST MEDICAL HISTORY:  Active Ambulatory Problems     Diagnosis Date Noted    No Active Ambulatory Problems     Resolved Ambulatory Problems     Diagnosis Date Noted    No Resolved Ambulatory Problems     No Additional Past Medical History       PAST SURGICAL HISTORY:  No past surgical history on file.    SOCIAL HISTORY:   Social History     Socioeconomic History    Marital status: Single       FAMILY HISTORY:  No family history on file.    CURRENTS MEDICATIONS:  No current facility-administered  medications on file prior to encounter.     Current Outpatient Medications on File Prior to Encounter   Medication Sig Dispense Refill    amLODIPine (NORVASC) 10 MG tablet Take 1 tablet by mouth once daily.      atorvastatin (LIPITOR) 40 MG tablet Take 40 mg by mouth once daily.      chlorthalidone (HYGROTEN) 25 MG Tab Take 1 tablet by mouth once daily.      valsartan (DIOVAN) 320 MG tablet Take 1 tablet by mouth once daily.         ALLERGIES:  Review of patient's allergies indicates:  No Known Allergies    REVIEW OF SYSTEMS:  ROS      OBJECTIVE:    PHYSICAL EXAMINATION:   Vitals:    03/17/24 0948   BP: 122/86   Pulse:    Resp:    Temp:      Physical Exam:  Vitals reviewed.     Constitutional: He appears well-developed and well-nourished.      Eyes: Pupils are equal, round, and reactive to light. Conjunctivae and EOM are normal.      Cardiovascular: Normal distal pulses and no edema.      Abdominal: Soft.      Skin: Skin displays no rash on trunk and no rash on extremities. Skin displays no lesions on trunk and no lesions on extremities.      Psych/Behavior: He is alert. He is oriented to person, place, and time. He has a normal mood and affect.      Musculoskeletal:        Neck: Range of motion is limited.      Neurological:        DTRs: Tricep reflexes are 0 on the right side and 0 on the left side. Bicep reflexes are 0 on the right side and 0 on the left side. Brachioradialis reflexes are 0 on the right side and 0 on the left side. Patellar reflexes are 0 on the right side and 0 on the left side. Achilles reflexes are 0 on the right side and 0 on the left side.         Back Exam      Muscle Strength   Right Quadriceps:  5/5   Left Quadriceps:  5/5   Right Hamstrings:  5/5   Left Hamstrings:  5/5                 Neurologic Exam      Mental Status   Oriented to person, place, and time.   Speech: speech is normal   Level of consciousness: alert     Cranial Nerves   Cranial nerves II through XII intact.      CN III,  IV, VI   Pupils are equal, round, and reactive to light.  Extraocular motions are normal.      Motor Exam   Muscle bulk: normal  Overall muscle tone: normal     Strength   Right deltoid: 5/5  Left deltoid: 5/5  Right biceps: 5/5  Left biceps: 5/5  Right triceps: 5/5  Left triceps: 5/5  Right wrist flexion: 5/5  Left wrist flexion: 5/5  Right wrist extension: 5/5  Left wrist extension: 5/5  Right interossei: 5/5  Left interossei: 5/5  Right iliopsoas: 5/5  Left iliopsoas: 5/5  Right quadriceps: 5/5  Left quadriceps: 5/5  Right hamstrin/5  Left hamstrin/5  Right anterior tibial: 5/5  Left anterior tibial: 5/5  Right posterior tibial: 5/5  Left posterior tibial: 5/5  Right peroneal: 5/5  Left peroneal: 5/5  Right gastroc: 5/5  Left gastroc: 5/5     Sensory Exam   Light touch normal.   Pinprick normal.      Gait, Coordination, and Reflexes      Gait  Gait: (slightly spastic)     Coordination   Finger to nose coordination: normal  Tandem gait test: difficult.     Reflexes   Right brachioradialis: 0  Left brachioradialis: 0  Right biceps: 0  Left biceps: 0  Right triceps: 0  Left triceps: 0  Right patellar: 0  Left patellar: 0  Right achilles: 0  Left achilles: 0  Right plantar: normal  Left plantar: normal  Right Tabor: absent  Left Tabor: absent  Right ankle clonus: absent  Left ankle clonus: absent         DIAGNOSTIC DATA:  I personally interpreted the following imaging:   Cervical spine MRI reviewed showing severe central canal stenosis at C3-4 and C4-5 .  Cord compression with cord edema    ASSESSMENT:  This is a 53 y.o. male with     Problem List Items Addressed This Visit          Neuro    Spinal cord compression - Primary    Cervical spinal stenosis     Other Visit Diagnoses       Chest pain        Relevant Orders    EKG 12-lead (Completed)    EKG 12-lead    Spinal stenosis, cervical region        Relevant Orders    CT Cervical Spine Without Contrast (Completed)    Left upper extremity numbness         Right upper extremity numbness              Spinal cord edema    PLAN:  Anterior cervical fusion planned for this Tuesday with Dr. Clayton.            Sadiq Qureshi MD  Cell:[phone]

## 2024-03-17 NOTE — ASSESSMENT & PLAN NOTE
Chronic, uncontrolled. Latest blood pressure and vitals reviewed-     Temp:  [98.2 °F (36.8 °C)-98.9 °F (37.2 °C)]   Pulse:  [65-81]   Resp:  [18]   BP: (119-150)/(62-92)   SpO2:  [97 %-100 %] .   Home meds for hypertension were reviewed and noted below.       While in the hospital, will manage blood pressure as follows; Continue home antihypertensive regimen    Will utilize p.r.n. blood pressure medication only if patient's blood pressure greater than 180/110 and he develops symptoms such as worsening chest pain or shortness of breath.

## 2024-03-17 NOTE — PROGRESS NOTES
[facility] Medicine  Progress Note    Patient Name: Obed Alfred  MRN: [id number]  Patient Class: IP- Inpatient   Admission Date: 3/14/2024  Length of Stay: 1 days  Attending Physician: Theo Diaz MD  Primary Care Provider: Juany Robertson MD        Subjective:     Principal Problem:Weakness of both upper extremities        HPI:  53 year old male with a past medical history of HTN, HLD, DM, anemia presents to the emergency room with reports of numbness and weakness to both upper extremities and hands that began 4 days ago. Reports started in both hands than radiated to elbows. 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 Reports resting makes it better and using arms makes it worse. Denies fever, chills, recent travel, URI symptoms, GI symptoms, rash, dysuria, abdominal pain, headache, recent trauma. Started new blood pressure of chlorthalidone a week ago.   In ER, CBC and CMP unremarkable. Troponin negative x2. B12 TSH within normal limits. EKG with 1st degree AV block, no ST elevation, CT c spine: no acute fracture, degenerative changes in cervical spine,  MRI c spine: C3-C4, there is a broad-based disc protrusion asymmetric to the right, with subligamentous extruded component. This results in severe spinal canal stenosis asymmetric to the right, with cervical cord compression. There is increased non fluid-like T2 signal in the cord suggesting edema and or myelopathy. There is severe bilateral foraminal narrowing. C4-C5, there is a broad-based central disc protrusion, which contacts the ventral cervical cord and produces moderate spinal canal stenosis. There is mild-to-moderate bilateral foraminal narrowing.  C5-C6, there is broad-based disc bulging, indenting the ventral thecal sac and producing mild spinal canal stenosis.  There is moderate right neural foraminal narrowing.At C6-C7, there is mild broad-based disc bulging and  mild spinal canal stenosis, without significant foraminal narrowing. MRI brain negative for acute ischemic changes. Admit to hospital medicine with consults to Neurology and Neurosurgery. Dilaudid and Tylenol given in ER.     Overview/Hospital Course:  Patient admitted to Hospital Medicine with numbness and weakness to both upper extremities and hands that began 4 days ago   He was evaluated by neurosurgeon.  Radiology imaging was consistent with C3-4 cord compression with ventral cord de formation at C4-by.  Dr. Clayton from neurosurgery recommended anterior cervical disc fusion surgery to be performed in next 3-5 days.  Patient received supportive care with gabapentin, intravenous narcotics and Robaxin.  A [place] J collar was placed. Later Neuro surgery seen and decided for fusion surgery on Tuesday and continue to hold all blood thinners.    Interval History:  Doing well , no worsening weakness    Review of Systems  Objective:     Vital Signs (Most Recent):  Temp: 98.9 °F (37.2 °C) (03/17/24 1204)  Pulse: 77 (03/17/24 1204)  Resp: 18 (03/17/24 1204)  BP: 119/62 (03/17/24 1204)  SpO2: 97 % (03/17/24 1204) Vital Signs (24h Range):  Temp:  [98.2 °F (36.8 °C)-98.9 °F (37.2 °C)] 98.9 °F (37.2 °C)  Pulse:  [65-81] 77  Resp:  [18] 18  SpO2:  [97 %-100 %] 97 %  BP: (119-150)/(62-92) 119/62     Weight: (!) 137.9 kg (304 lb)  Body mass index is 44.89 kg/m².    Intake/Output Summary (Last 24 hours) at 3/17/2024 1314  Last data filed at 3/17/2024 0859  Gross per 24 hour   Intake 480 ml   Output --   Net 480 ml         Physical Exam  Constitutional:       General: He is not in acute distress.     Appearance: He is well-developed.   HENT:      Head: Normocephalic.   Eyes:      Pupils: Pupils are equal, round, and reactive to light.   Cardiovascular:      Rate and Rhythm: Normal rate and regular rhythm.   Pulmonary:      Effort: Pulmonary effort is normal. No respiratory distress.   Abdominal:      General: Abdomen is flat. There  is no distension.      Tenderness: There is no abdominal tenderness.   Musculoskeletal:      Cervical back: Neck supple.   Skin:     Findings: No rash.   Neurological:      Mental Status: He is alert and oriented to person, place, and time.   Psychiatric:         Mood and Affect: Mood normal.             Significant Labs: All pertinent labs within the past 24 hours have been reviewed.  CBC:   Recent Labs   Lab 03/16/24  0736 03/17/24  0800   WBC 6.39 6.35   HGB 11.2* 10.5*   HCT 36.6* 34.8*    278     CMP:   Recent Labs   Lab 03/16/24  0736 03/17/24  0800    135*   K 4.2 4.3    104   CO2 28 27   * 124*   BUN 21* 30*   CREATININE 1.5* 1.7*   CALCIUM 9.5 8.8   PROT 7.2 6.9   ALBUMIN 4.0 3.7   BILITOT 0.6 0.5   ALKPHOS 72 71   AST 15 16   ALT 18 19   ANIONGAP 7* 4*       Significant Imaging: I have reviewed all pertinent imaging results/findings within the past 24 hours.    Assessment/Plan:      * Weakness of both upper extremities  Ataxia present  MRI brain negative for stroke, MRI C-spine Broad-based disc protrusion with subligamentous extruded component at C3-C4, producing severe spinal canal stenosis and cervical cord compression, with findings of cord edema and or myelopathy   Neurology and Neurosurgery consulted.   For surgical intervention Tuesday  Continue gabapentin and Robaxin use with fall precautions.  C collar   Dilaudid for pain control  PT/OT  Add  steroid if needed  For SL surgery on Tuesday by dr Clayton        Body mass index (BMI) of 33.0-33.9 in adult  Body mass index is 44.89 kg/m². Morbid obesity complicates all aspects of disease management from diagnostic modalities to treatment. Weight loss encouraged and health benefits explained to patient.       DM (diabetes mellitus)  Patient's FSGs are uncontrolled due to hyperglycemia on current medication regimen.  Last A1c reviewed-   Lab Results   Component Value Date    HGBA1C 6.5 (H) 01/25/2024     Most recent fingerstick  "glucose reviewed- No results for input(s): "POCTGLUCOSE" in the last 24 hours.  Current correctional scale  High  Maintain anti-hyperglycemic dose as follows-   Antihyperglycemics (From admission, onward)      Start     Stop Route Frequency Ordered    03/14/24 1819  insulin aspart U-100 pen 0-5 Units         -- SubQ Before meals & nightly PRN 03/14/24 1720          Hold Oral hypoglycemics while patient is in the hospital.    Essential hypertension  Chronic, uncontrolled. Latest blood pressure and vitals reviewed-     Temp:  [98.2 °F (36.8 °C)-98.9 °F (37.2 °C)]   Pulse:  [65-81]   Resp:  [18]   BP: (119-150)/(62-92)   SpO2:  [97 %-100 %] .   Home meds for hypertension were reviewed and noted below.       While in the hospital, will manage blood pressure as follows; Continue home antihypertensive regimen    Will utilize p.r.n. blood pressure medication only if patient's blood pressure greater than 180/110 and he develops symptoms such as worsening chest pain or shortness of breath.    Cervical spinal stenosis  See above      Spinal cord compression  See above      Paresthesia and pain of both upper extremities  See above  For surgical intervention on Tuesday        VTE Risk Mitigation (From admission, onward)           Ordered     enoxaparin injection 40 mg  Every 12 hours         03/17/24 0956     IP VTE HIGH RISK PATIENT  Once         03/14/24 1720     Place sequential compression device  Until discontinued         03/14/24 1720                    Discharge Planning   MAXIMILIANO:      Code Status: Full Code   Is the patient medically ready for discharge?:     Reason for patient still in hospital (select all that apply): Treatment  Discharge Plan A: Home with family                  Theo Diaz MD  Department of Hospital Medicine   [facility]    "

## 2024-03-17 NOTE — SUBJECTIVE & OBJECTIVE
Interval History:  Doing well , no worsening weakness    Review of Systems  Objective:     Vital Signs (Most Recent):  Temp: 98.9 °F (37.2 °C) (03/17/24 1204)  Pulse: 77 (03/17/24 1204)  Resp: 18 (03/17/24 1204)  BP: 119/62 (03/17/24 1204)  SpO2: 97 % (03/17/24 1204) Vital Signs (24h Range):  Temp:  [98.2 °F (36.8 °C)-98.9 °F (37.2 °C)] 98.9 °F (37.2 °C)  Pulse:  [65-81] 77  Resp:  [18] 18  SpO2:  [97 %-100 %] 97 %  BP: (119-150)/(62-92) 119/62     Weight: (!) 137.9 kg (304 lb)  Body mass index is 44.89 kg/m².    Intake/Output Summary (Last 24 hours) at 3/17/2024 1314  Last data filed at 3/17/2024 0859  Gross per 24 hour   Intake 480 ml   Output --   Net 480 ml         Physical Exam  Constitutional:       General: He is not in acute distress.     Appearance: He is well-developed.   HENT:      Head: Normocephalic.   Eyes:      Pupils: Pupils are equal, round, and reactive to light.   Cardiovascular:      Rate and Rhythm: Normal rate and regular rhythm.   Pulmonary:      Effort: Pulmonary effort is normal. No respiratory distress.   Abdominal:      General: Abdomen is flat. There is no distension.      Tenderness: There is no abdominal tenderness.   Musculoskeletal:      Cervical back: Neck supple.   Skin:     Findings: No rash.   Neurological:      Mental Status: He is alert and oriented to person, place, and time.   Psychiatric:         Mood and Affect: Mood normal.             Significant Labs: All pertinent labs within the past 24 hours have been reviewed.  CBC:   Recent Labs   Lab 03/16/24  0736 03/17/24  0800   WBC 6.39 6.35   HGB 11.2* 10.5*   HCT 36.6* 34.8*    278     CMP:   Recent Labs   Lab 03/16/24  0736 03/17/24  0800    135*   K 4.2 4.3    104   CO2 28 27   * 124*   BUN 21* 30*   CREATININE 1.5* 1.7*   CALCIUM 9.5 8.8   PROT 7.2 6.9   ALBUMIN 4.0 3.7   BILITOT 0.6 0.5   ALKPHOS 72 71   AST 15 16   ALT 18 19   ANIONGAP 7* 4*       Significant Imaging: I have reviewed all  pertinent imaging results/findings within the past 24 hours.

## 2024-03-17 NOTE — ASSESSMENT & PLAN NOTE
Ataxia present  MRI brain negative for stroke, MRI C-spine Broad-based disc protrusion with subligamentous extruded component at C3-C4, producing severe spinal canal stenosis and cervical cord compression, with findings of cord edema and or myelopathy   Neurology and Neurosurgery consulted.   For surgical intervention Tuesday  Continue gabapentin and Robaxin use with fall precautions.  C collar   Dilaudid for pain control  PT/OT  Add  steroid if needed  For SL surgery on Tuesday by dr Clayton

## 2024-03-18 LAB
ALBUMIN SERPL BCP-MCNC: 3.8 G/DL (ref 3.5–5.2)
ALP SERPL-CCNC: 68 U/L (ref 55–135)
ALT SERPL W/O P-5'-P-CCNC: 21 U/L (ref 10–44)
ANION GAP SERPL CALC-SCNC: 7 MMOL/L (ref 8–16)
ANION GAP SERPL CALC-SCNC: 7 MMOL/L (ref 8–16)
AST SERPL-CCNC: 18 U/L (ref 10–40)
BASOPHILS # BLD AUTO: 0.04 K/UL (ref 0–0.2)
BASOPHILS NFR BLD: 0.6 % (ref 0–1.9)
BILIRUB SERPL-MCNC: 0.5 MG/DL (ref 0.1–1)
BUN SERPL-MCNC: 30 MG/DL (ref 6–20)
BUN SERPL-MCNC: 30 MG/DL (ref 6–20)
CALCIUM SERPL-MCNC: 9 MG/DL (ref 8.7–10.5)
CALCIUM SERPL-MCNC: 9 MG/DL (ref 8.7–10.5)
CHLORIDE SERPL-SCNC: 104 MMOL/L (ref 95–110)
CHLORIDE SERPL-SCNC: 104 MMOL/L (ref 95–110)
CO2 SERPL-SCNC: 25 MMOL/L (ref 23–29)
CO2 SERPL-SCNC: 25 MMOL/L (ref 23–29)
CREAT SERPL-MCNC: 1.7 MG/DL (ref 0.5–1.4)
CREAT SERPL-MCNC: 1.7 MG/DL (ref 0.5–1.4)
D DIMER PPP IA.FEU-MCNC: 0.55 MG/L FEU (ref 0–0.49)
DIFFERENTIAL METHOD BLD: ABNORMAL
EOSINOPHIL # BLD AUTO: 0.2 K/UL (ref 0–0.5)
EOSINOPHIL NFR BLD: 2.8 % (ref 0–8)
ERYTHROCYTE [DISTWIDTH] IN BLOOD BY AUTOMATED COUNT: 12.3 % (ref 11.5–14.5)
ERYTHROCYTE [DISTWIDTH] IN BLOOD BY AUTOMATED COUNT: 12.3 % (ref 11.5–14.5)
EST. GFR  (NO RACE VARIABLE): 47 ML/MIN/1.73 M^2
EST. GFR  (NO RACE VARIABLE): 47 ML/MIN/1.73 M^2
GLUCOSE SERPL-MCNC: 116 MG/DL (ref 70–110)
GLUCOSE SERPL-MCNC: 116 MG/DL (ref 70–110)
GLUCOSE SERPL-MCNC: 137 MG/DL (ref 70–110)
GLUCOSE SERPL-MCNC: 138 MG/DL (ref 70–110)
GLUCOSE SERPL-MCNC: 161 MG/DL (ref 70–110)
GLUCOSE SERPL-MCNC: 191 MG/DL (ref 70–110)
HCT VFR BLD AUTO: 32.4 % (ref 40–54)
HCT VFR BLD AUTO: 32.4 % (ref 40–54)
HGB BLD-MCNC: 10 G/DL (ref 14–18)
HGB BLD-MCNC: 10 G/DL (ref 14–18)
IMM GRANULOCYTES # BLD AUTO: 0.06 K/UL (ref 0–0.04)
IMM GRANULOCYTES NFR BLD AUTO: 0.9 % (ref 0–0.5)
INR PPP: 0.9 (ref 0.8–1.2)
LYMPHOCYTES # BLD AUTO: 2.9 K/UL (ref 1–4.8)
LYMPHOCYTES NFR BLD: 40.5 % (ref 18–48)
MAGNESIUM SERPL-MCNC: 1.8 MG/DL (ref 1.6–2.6)
MCH RBC QN AUTO: 25.8 PG (ref 27–31)
MCH RBC QN AUTO: 25.8 PG (ref 27–31)
MCHC RBC AUTO-ENTMCNC: 30.9 G/DL (ref 32–36)
MCHC RBC AUTO-ENTMCNC: 30.9 G/DL (ref 32–36)
MCV RBC AUTO: 84 FL (ref 82–98)
MCV RBC AUTO: 84 FL (ref 82–98)
MONOCYTES # BLD AUTO: 0.6 K/UL (ref 0.3–1)
MONOCYTES NFR BLD: 8.4 % (ref 4–15)
NEUTROPHILS # BLD AUTO: 3.3 K/UL (ref 1.8–7.7)
NEUTROPHILS NFR BLD: 46.8 % (ref 38–73)
NRBC BLD-RTO: 0 /100 WBC
PLATELET # BLD AUTO: 270 K/UL (ref 150–450)
PLATELET # BLD AUTO: 270 K/UL (ref 150–450)
PMV BLD AUTO: 9.7 FL (ref 9.2–12.9)
PMV BLD AUTO: 9.7 FL (ref 9.2–12.9)
POTASSIUM SERPL-SCNC: 4.3 MMOL/L (ref 3.5–5.1)
POTASSIUM SERPL-SCNC: 4.3 MMOL/L (ref 3.5–5.1)
PROCALCITONIN SERPL IA-MCNC: 0.14 NG/ML (ref 0–0.5)
PROT SERPL-MCNC: 6.8 G/DL (ref 6–8.4)
PROTHROMBIN TIME: 10.3 SEC (ref 9–12.5)
RBC # BLD AUTO: 3.87 M/UL (ref 4.6–6.2)
RBC # BLD AUTO: 3.87 M/UL (ref 4.6–6.2)
SODIUM SERPL-SCNC: 136 MMOL/L (ref 136–145)
SODIUM SERPL-SCNC: 136 MMOL/L (ref 136–145)
TROPONIN I SERPL HS-MCNC: 11.2 PG/ML (ref 0–14.9)
WBC # BLD AUTO: 7.03 K/UL (ref 3.9–12.7)
WBC # BLD AUTO: 7.03 K/UL (ref 3.9–12.7)

## 2024-03-18 PROCEDURE — 83735 ASSAY OF MAGNESIUM: CPT | Performed by: NURSE PRACTITIONER

## 2024-03-18 PROCEDURE — 84145 PROCALCITONIN (PCT): CPT | Performed by: HOSPITALIST

## 2024-03-18 PROCEDURE — 25000003 PHARM REV CODE 250: Performed by: NURSE PRACTITIONER

## 2024-03-18 PROCEDURE — 36415 COLL VENOUS BLD VENIPUNCTURE: CPT | Performed by: HOSPITALIST

## 2024-03-18 PROCEDURE — 85025 COMPLETE CBC W/AUTO DIFF WBC: CPT | Performed by: NURSE PRACTITIONER

## 2024-03-18 PROCEDURE — 80053 COMPREHEN METABOLIC PANEL: CPT | Performed by: NURSE PRACTITIONER

## 2024-03-18 PROCEDURE — 85610 PROTHROMBIN TIME: CPT | Performed by: INTERNAL MEDICINE

## 2024-03-18 PROCEDURE — 63600175 PHARM REV CODE 636 W HCPCS: Performed by: STUDENT IN AN ORGANIZED HEALTH CARE EDUCATION/TRAINING PROGRAM

## 2024-03-18 PROCEDURE — 99499 UNLISTED E&M SERVICE: CPT | Mod: ,,, | Performed by: HEALTH CARE PROVIDER

## 2024-03-18 PROCEDURE — 36415 COLL VENOUS BLD VENIPUNCTURE: CPT | Performed by: INTERNAL MEDICINE

## 2024-03-18 PROCEDURE — 99232 SBSQ HOSP IP/OBS MODERATE 35: CPT | Mod: FS,,, | Performed by: NEUROLOGICAL SURGERY

## 2024-03-18 PROCEDURE — 12000002 HC ACUTE/MED SURGE SEMI-PRIVATE ROOM

## 2024-03-18 PROCEDURE — 25000003 PHARM REV CODE 250: Performed by: STUDENT IN AN ORGANIZED HEALTH CARE EDUCATION/TRAINING PROGRAM

## 2024-03-18 PROCEDURE — 84484 ASSAY OF TROPONIN QUANT: CPT | Performed by: HOSPITALIST

## 2024-03-18 PROCEDURE — 85379 FIBRIN DEGRADATION QUANT: CPT | Performed by: HOSPITALIST

## 2024-03-18 RX ORDER — ATORVASTATIN CALCIUM 40 MG/1
40 TABLET, FILM COATED ORAL NIGHTLY
Status: DISCONTINUED | OUTPATIENT
Start: 2024-03-18 | End: 2024-03-20 | Stop reason: HOSPADM

## 2024-03-18 RX ADMIN — METHOCARBAMOL TABLETS 750 MG: 750 TABLET, COATED ORAL at 05:03

## 2024-03-18 RX ADMIN — SENNOSIDES AND DOCUSATE SODIUM 1 TABLET: 8.6; 5 TABLET ORAL at 08:03

## 2024-03-18 RX ADMIN — GABAPENTIN 300 MG: 300 CAPSULE ORAL at 03:03

## 2024-03-18 RX ADMIN — GABAPENTIN 300 MG: 300 CAPSULE ORAL at 08:03

## 2024-03-18 RX ADMIN — SODIUM CHLORIDE, POTASSIUM CHLORIDE, SODIUM LACTATE AND CALCIUM CHLORIDE 1000 ML: 600; 310; 30; 20 INJECTION, SOLUTION INTRAVENOUS at 08:03

## 2024-03-18 RX ADMIN — GABAPENTIN 300 MG: 300 CAPSULE ORAL at 09:03

## 2024-03-18 RX ADMIN — AMLODIPINE BESYLATE 10 MG: 5 TABLET ORAL at 08:03

## 2024-03-18 RX ADMIN — ATORVASTATIN CALCIUM 40 MG: 40 TABLET, FILM COATED ORAL at 09:03

## 2024-03-18 RX ADMIN — METHOCARBAMOL TABLETS 750 MG: 750 TABLET, COATED ORAL at 01:03

## 2024-03-18 RX ADMIN — METHOCARBAMOL TABLETS 750 MG: 750 TABLET, COATED ORAL at 09:03

## 2024-03-18 RX ADMIN — METHOCARBAMOL TABLETS 750 MG: 750 TABLET, COATED ORAL at 08:03

## 2024-03-18 NOTE — ASSESSMENT & PLAN NOTE
Mr. Obed Alfred is a 53 year old male with PMHx of HTN, Type 2 diabetes presented to [facility] ED due to bilateral hand paraesthesias and subjective upper extremity weakness. Neurosurgery consulted for spinal cord stenosis and cervical cord compression.     Continues to be neurologically stable    Medical clearance provided.  Anticipate C3-5 ACDF early this week.

## 2024-03-18 NOTE — SUBJECTIVE & OBJECTIVE
Interval History:   Patient is doing well, his pain is more controlled     Review of Systems   Constitutional:  Negative for activity change, appetite change, fatigue and fever.   HENT:  Negative for postnasal drip, rhinorrhea, sinus pressure and sinus pain.    Eyes:  Negative for photophobia and visual disturbance.   Respiratory:  Negative for apnea, choking and shortness of breath.    Cardiovascular:  Negative for chest pain, palpitations and leg swelling.   Gastrointestinal:  Negative for abdominal distention, abdominal pain, constipation, diarrhea and vomiting.   Endocrine: Negative for polydipsia, polyphagia and polyuria.   Genitourinary:  Negative for dysuria, frequency, hematuria and urgency.   Musculoskeletal:  Negative for back pain, gait problem and joint swelling.   Skin:  Negative for pallor, rash and wound.   Neurological:  Negative for tremors, weakness, numbness and headaches.   Psychiatric/Behavioral:  Negative for confusion and hallucinations. The patient is not nervous/anxious.      Objective:     Vital Signs (Most Recent):  Temp: 98.9 °F (37.2 °C) (03/18/24 1150)  Pulse: 66 (03/18/24 1150)  Resp: 19 (03/18/24 1150)  BP: 125/63 (03/18/24 1150)  SpO2: 98 % (03/18/24 1150) Vital Signs (24h Range):  Temp:  [98.1 °F (36.7 °C)-98.9 °F (37.2 °C)] 98.9 °F (37.2 °C)  Pulse:  [] 66  Resp:  [18-19] 19  SpO2:  [97 %-100 %] 98 %  BP: ()/(51-76) 125/63     Weight: (!) 137.9 kg (304 lb)  Body mass index is 44.89 kg/m².    Intake/Output Summary (Last 24 hours) at 3/18/2024 1534  Last data filed at 3/18/2024 1439  Gross per 24 hour   Intake 1070 ml   Output --   Net 1070 ml         Physical Exam  HENT:      Head: Normocephalic and atraumatic.      Right Ear: External ear normal.      Left Ear: External ear normal.   Eyes:      Conjunctiva/sclera: Conjunctivae normal.   Cardiovascular:      Rate and Rhythm: Normal rate and regular rhythm.      Heart sounds: No murmur heard.  Pulmonary:      Effort: No  respiratory distress.      Breath sounds: No stridor. No wheezing, rhonchi or rales.   Chest:      Chest wall: No tenderness.   Abdominal:      General: There is no distension.      Palpations: There is no mass.      Tenderness: There is no abdominal tenderness. There is no guarding or rebound.      Hernia: No hernia is present.   Musculoskeletal:         General: Tenderness, deformity and signs of injury present. No swelling.   Skin:     Findings: No bruising.   Neurological:      Mental Status: He is alert. Mental status is at baseline.      Motor: No weakness.             Significant Labs: All pertinent labs within the past 24 hours have been reviewed.  CBC:   Recent Labs   Lab 03/17/24  0800 03/18/24  0508   WBC 6.35 7.03  7.03   HGB 10.5* 10.0*  10.0*   HCT 34.8* 32.4*  32.4*    270  270     CMP:   Recent Labs   Lab 03/17/24  0800 03/18/24  0508   * 136  136   K 4.3 4.3  4.3    104  104   CO2 27 25  25   * 116*  116*   BUN 30* 30*  30*   CREATININE 1.7* 1.7*  1.7*   CALCIUM 8.8 9.0  9.0   PROT 6.9 6.8   ALBUMIN 3.7 3.8   BILITOT 0.5 0.5   ALKPHOS 71 68   AST 16 18   ALT 19 21   ANIONGAP 4* 7*  7*       Significant Imaging: I have reviewed all pertinent imaging results/findings within the past 24 hours.

## 2024-03-18 NOTE — PLAN OF CARE
Present during attending rounding. Pt has reported being up and ambulating without issues.  To have surgery 3/19/24. Currently with no discharge needs but will continue to follow for possible discharge needs.  Plan at this time is home with outpt f/u       03/18/24 1317   Post-Acute Status   Post-Acute Authorization Other   Other Status No Post-Acute Service Needs   Discharge Delays None known at this time   Discharge Plan   Discharge Plan A Home with family   Discharge Plan B Home Health

## 2024-03-18 NOTE — SUBJECTIVE & OBJECTIVE
Interval History: 3/18:  No acute events overnight.  Afebrile.  Blood pressure 131/76. pulse 69.  Respirations 19.  WBC 7.23.  Hemoglobin 10.0.  Platelets 270.  Patient found in bed, awake and alert.  Today, patient reports no change in his symptoms.  Denies new extremity weakness, numbness or tingling.    Medications:  Continuous Infusions:  Scheduled Meds:   amLODIPine  10 mg Oral Daily    atorvastatin  40 mg Oral QHS    enoxparin  40 mg Subcutaneous Q12H (prophylaxis, 0900/2100)    gabapentin  300 mg Oral TID    lactated ringers  1,000 mL Intravenous Once    methocarbamoL  750 mg Oral QID    senna-docusate 8.6-50 mg  1 tablet Oral Daily     PRN Meds:acetaminophen, acetaminophen, aluminum-magnesium hydroxide-simethicone, dextrose 50% in water (D50W), dextrose 50% in water (D50W), glucagon (human recombinant), glucose, glucose, HYDROcodone-acetaminophen, insulin aspart U-100, magnesium oxide, magnesium oxide, melatonin, naloxone, ondansetron, potassium bicarbonate, potassium bicarbonate, potassium bicarbonate, potassium, sodium phosphates, potassium, sodium phosphates, potassium, sodium phosphates, sodium chloride 0.9%       Objective:     Weight: (!) 137.9 kg (304 lb)  Body mass index is 44.89 kg/m².  Vital Signs (Most Recent):  Temp: 98.7 °F (37.1 °C) (03/18/24 0758)  Pulse: 69 (03/18/24 0758)  Resp: 19 (03/18/24 0758)  BP: 131/76 (03/18/24 0758)  SpO2: 100 % (03/18/24 0758) Vital Signs (24h Range):  Temp:  [98.1 °F (36.7 °C)-98.9 °F (37.2 °C)] 98.7 °F (37.1 °C)  Pulse:  [] 69  Resp:  [18-19] 19  SpO2:  [97 %-100 %] 100 %  BP: ()/(51-86) 131/76         Neurosurgery Physical Exam  General: well developed, well nourished, no distress.   Head: normocephalic, atraumatic  Neck: No tracheal deviation.    Neurologic: Alert and oriented. Thought content appropriate.  GCS: E4 V5 M6; Total: 15  Pulmonary: normal respirations, no signs of respiratory distress  Skin: Skin is warm, dry and intact.    Sensory:  intact to light touch throughout  Motor Strength: Moves all extremities spontaneously with good tone.    No abnormal movements seen.   Bilateral upper extremity strength 5/5 throughout.  Bilateral lower extremity strength 5/5 throughout.       Significant Labs:  Recent Labs   Lab 03/17/24  0800 03/18/24  0508   * 116*  116*   * 136  136   K 4.3 4.3  4.3    104  104   CO2 27 25  25   BUN 30* 30*  30*   CREATININE 1.7* 1.7*  1.7*   CALCIUM 8.8 9.0  9.0   MG 1.8 1.8     Recent Labs   Lab 03/17/24  0800 03/18/24  0508   WBC 6.35 7.03  7.03   HGB 10.5* 10.0*  10.0*   HCT 34.8* 32.4*  32.4*    270  270     Recent Labs   Lab 03/18/24  0508   INR 0.9     Microbiology Results (last 7 days)       ** No results found for the last 168 hours. **

## 2024-03-18 NOTE — PROGRESS NOTES
[facility]  Neurosurgery  Progress Note    Subjective:     History of Present Illness: Mr. Obed Alfred is a 53 year old male with PMHx of HTN, Type 2 diabetes presented to [facility] ED due to bilateral hand paraesthesias and subjective upper extremity weakness. Subjective left arm weakness greater than right. Patient reported approximately 1 month ago he noticed he was having right sided cervical pain that radiated to his right shoulder, anterior upper arm forearm and fingers with cervical extension. He was also recently fired from his job as a  because he had difficulty lifting plates. He reports imbalance, hand clumsiness, and difficulty with zippers. Denies falls, bladder/bowel dysfunction, cervical LILIAN, or cervical surgery.     MRI cervical obtained today revealed C3-4 disc protrusion resulting in severe spinal canal stenosis.    Neurosurgery consulted for spinal cord stenosis and cervical cord compression.       Post-Op Info:  Procedure(s) (LRB):  DISCECTOMY, SPINE, CERVICAL, ANTERIOR APPROACH, WITH FUSION (Right)       Interval History: 3/18:  No acute events overnight.  Afebrile.  Blood pressure 131/76. pulse 69.  Respirations 19.  WBC 7.23.  Hemoglobin 10.0.  Platelets 270.  Patient found in bed, awake and alert.  Today, patient reports no change in his symptoms.  Denies new extremity weakness, numbness or tingling.    Medications:  Continuous Infusions:  Scheduled Meds:   amLODIPine  10 mg Oral Daily    atorvastatin  40 mg Oral QHS    enoxparin  40 mg Subcutaneous Q12H (prophylaxis, 0900/2100)    gabapentin  300 mg Oral TID    lactated ringers  1,000 mL Intravenous Once    methocarbamoL  750 mg Oral QID    senna-docusate 8.6-50 mg  1 tablet Oral Daily     PRN Meds:acetaminophen, acetaminophen, aluminum-magnesium hydroxide-simethicone, dextrose 50% in water (D50W), dextrose 50% in water (D50W), glucagon (human recombinant), glucose, glucose, HYDROcodone-acetaminophen, insulin aspart  U-100, magnesium oxide, magnesium oxide, melatonin, naloxone, ondansetron, potassium bicarbonate, potassium bicarbonate, potassium bicarbonate, potassium, sodium phosphates, potassium, sodium phosphates, potassium, sodium phosphates, sodium chloride 0.9%       Objective:     Weight: (!) 137.9 kg (304 lb)  Body mass index is 44.89 kg/m².  Vital Signs (Most Recent):  Temp: 98.7 °F (37.1 °C) (03/18/24 0758)  Pulse: 69 (03/18/24 0758)  Resp: 19 (03/18/24 0758)  BP: 131/76 (03/18/24 0758)  SpO2: 100 % (03/18/24 0758) Vital Signs (24h Range):  Temp:  [98.1 °F (36.7 °C)-98.9 °F (37.2 °C)] 98.7 °F (37.1 °C)  Pulse:  [] 69  Resp:  [18-19] 19  SpO2:  [97 %-100 %] 100 %  BP: ()/(51-86) 131/76         Neurosurgery Physical Exam  General: well developed, well nourished, no distress.   Head: normocephalic, atraumatic  Neck: No tracheal deviation.    Neurologic: Alert and oriented. Thought content appropriate.  GCS: E4 V5 M6; Total: 15  Pulmonary: normal respirations, no signs of respiratory distress  Skin: Skin is warm, dry and intact.    Sensory: intact to light touch throughout  Motor Strength: Moves all extremities spontaneously with good tone.    No abnormal movements seen.   Bilateral upper extremity strength 5/5 throughout.  Bilateral lower extremity strength 5/5 throughout.       Significant Labs:  Recent Labs   Lab 03/17/24  0800 03/18/24  0508   * 116*  116*   * 136  136   K 4.3 4.3  4.3    104  104   CO2 27 25  25   BUN 30* 30*  30*   CREATININE 1.7* 1.7*  1.7*   CALCIUM 8.8 9.0  9.0   MG 1.8 1.8     Recent Labs   Lab 03/17/24  0800 03/18/24  0508   WBC 6.35 7.03  7.03   HGB 10.5* 10.0*  10.0*   HCT 34.8* 32.4*  32.4*    270  270     Recent Labs   Lab 03/18/24  0508   INR 0.9     Microbiology Results (last 7 days)       ** No results found for the last 168 hours. **            Assessment/Plan:     Spinal cord compression  Mr. Obed Alfred is a 53 year old male with PMHx  of HTN, Type 2 diabetes presented to [facility] ED due to bilateral hand paraesthesias and subjective upper extremity weakness. Neurosurgery consulted for spinal cord stenosis and cervical cord compression.     Continues to be neurologically stable    Medical clearance provided.  Anticipate C3-5 ACDF early this week.                CECILE SALINAS PA-C  Neurosurgery  [facility]

## 2024-03-18 NOTE — PROGRESS NOTES
[facility] Medicine  Progress Note    Patient Name: Obed Alfred  MRN: [id number]  Patient Class: IP- Inpatient   Admission Date: 3/14/2024  Length of Stay: 2 days  Attending Physician: Marito Saldivar MD  Primary Care Provider: Juany Robertson MD        Subjective:     Principal Problem:Weakness of both upper extremities        HPI:  53 year old male with a past medical history of HTN, HLD, DM, anemia presents to the emergency room with reports of numbness and weakness to both upper extremities and hands that began 4 days ago. Reports started in both hands than radiated to elbows. 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 Reports resting makes it better and using arms makes it worse. Denies fever, chills, recent travel, URI symptoms, GI symptoms, rash, dysuria, abdominal pain, headache, recent trauma. Started new blood pressure of chlorthalidone a week ago.   In ER, CBC and CMP unremarkable. Troponin negative x2. B12 TSH within normal limits. EKG with 1st degree AV block, no ST elevation, CT c spine: no acute fracture, degenerative changes in cervical spine,  MRI c spine: C3-C4, there is a broad-based disc protrusion asymmetric to the right, with subligamentous extruded component. This results in severe spinal canal stenosis asymmetric to the right, with cervical cord compression. There is increased non fluid-like T2 signal in the cord suggesting edema and or myelopathy. There is severe bilateral foraminal narrowing. C4-C5, there is a broad-based central disc protrusion, which contacts the ventral cervical cord and produces moderate spinal canal stenosis. There is mild-to-moderate bilateral foraminal narrowing.  C5-C6, there is broad-based disc bulging, indenting the ventral thecal sac and producing mild spinal canal stenosis.  There is moderate right neural foraminal narrowing.At C6-C7, there is mild broad-based disc bulging  and mild spinal canal stenosis, without significant foraminal narrowing. MRI brain negative for acute ischemic changes. Admit to hospital medicine with consults to Neurology and Neurosurgery. Dilaudid and Tylenol given in ER.     Overview/Hospital Course:  Patient admitted to Hospital Medicine with numbness and weakness to both upper extremities and hands that began 4 days ago   He was evaluated by neurosurgeon.  Radiology imaging was consistent with C3-4 cord compression with ventral cord de formation at C4-by.  Dr. Clayton from neurosurgery recommended anterior cervical disc fusion surgery to be performed in next 3-5 days.  Patient received supportive care with gabapentin, intravenous narcotics and Robaxin.  A [place] collar was placed. Later Neuro surgery seen and decided for fusion surgery on Tuesday and continue to hold all blood thinners.    Interval History:   Patient is doing well, his pain is more controlled     Review of Systems   Constitutional:  Negative for activity change, appetite change, fatigue and fever.   HENT:  Negative for postnasal drip, rhinorrhea, sinus pressure and sinus pain.    Eyes:  Negative for photophobia and visual disturbance.   Respiratory:  Negative for apnea, choking and shortness of breath.    Cardiovascular:  Negative for chest pain, palpitations and leg swelling.   Gastrointestinal:  Negative for abdominal distention, abdominal pain, constipation, diarrhea and vomiting.   Endocrine: Negative for polydipsia, polyphagia and polyuria.   Genitourinary:  Negative for dysuria, frequency, hematuria and urgency.   Musculoskeletal:  Negative for back pain, gait problem and joint swelling.   Skin:  Negative for pallor, rash and wound.   Neurological:  Negative for tremors, weakness, numbness and headaches.   Psychiatric/Behavioral:  Negative for confusion and hallucinations. The patient is not nervous/anxious.      Objective:     Vital Signs (Most Recent):  Temp: 98.9 °F (37.2 °C) (03/18/24  1150)  Pulse: 66 (03/18/24 1150)  Resp: 19 (03/18/24 1150)  BP: 125/63 (03/18/24 1150)  SpO2: 98 % (03/18/24 1150) Vital Signs (24h Range):  Temp:  [98.1 °F (36.7 °C)-98.9 °F (37.2 °C)] 98.9 °F (37.2 °C)  Pulse:  [] 66  Resp:  [18-19] 19  SpO2:  [97 %-100 %] 98 %  BP: ()/(51-76) 125/63     Weight: (!) 137.9 kg (304 lb)  Body mass index is 44.89 kg/m².    Intake/Output Summary (Last 24 hours) at 3/18/2024 1534  Last data filed at 3/18/2024 1439  Gross per 24 hour   Intake 1070 ml   Output --   Net 1070 ml         Physical Exam  HENT:      Head: Normocephalic and atraumatic.      Right Ear: External ear normal.      Left Ear: External ear normal.   Eyes:      Conjunctiva/sclera: Conjunctivae normal.   Cardiovascular:      Rate and Rhythm: Normal rate and regular rhythm.      Heart sounds: No murmur heard.  Pulmonary:      Effort: No respiratory distress.      Breath sounds: No stridor. No wheezing, rhonchi or rales.   Chest:      Chest wall: No tenderness.   Abdominal:      General: There is no distension.      Palpations: There is no mass.      Tenderness: There is no abdominal tenderness. There is no guarding or rebound.      Hernia: No hernia is present.   Musculoskeletal:         General: Tenderness, deformity and signs of injury present. No swelling.   Skin:     Findings: No bruising.   Neurological:      Mental Status: He is alert. Mental status is at baseline.      Motor: No weakness.             Significant Labs: All pertinent labs within the past 24 hours have been reviewed.  CBC:   Recent Labs   Lab 03/17/24  0800 03/18/24  0508   WBC 6.35 7.03  7.03   HGB 10.5* 10.0*  10.0*   HCT 34.8* 32.4*  32.4*    270  270     CMP:   Recent Labs   Lab 03/17/24  0800 03/18/24  0508   * 136  136   K 4.3 4.3  4.3    104  104   CO2 27 25  25   * 116*  116*   BUN 30* 30*  30*   CREATININE 1.7* 1.7*  1.7*   CALCIUM 8.8 9.0  9.0   PROT 6.9 6.8   ALBUMIN 3.7 3.8   BILITOT 0.5  "0.5   ALKPHOS 71 68   AST 16 18   ALT 19 21   ANIONGAP 4* 7*  7*       Significant Imaging: I have reviewed all pertinent imaging results/findings within the past 24 hours.    Assessment/Plan:      * Weakness of both upper extremities  Ataxia present  MRI brain negative for stroke, MRI C-spine Broad-based disc protrusion with subligamentous extruded component at C3-C4, producing severe spinal canal stenosis and cervical cord compression, with findings of cord edema and or myelopathy   Neurology and Neurosurgery consulted.   For surgical intervention Tuesday  Continue gabapentin and Robaxin use with fall precautions.  C collar   Dilaudid for pain control  PT/OT  Add  steroid if needed  For SL surgery on Tuesday by dr Clayton        Body mass index (BMI) of 33.0-33.9 in adult  Body mass index is 44.89 kg/m². Morbid obesity complicates all aspects of disease management from diagnostic modalities to treatment. Weight loss encouraged and health benefits explained to patient.       DM (diabetes mellitus)  Patient's FSGs are uncontrolled due to hyperglycemia on current medication regimen.  Last A1c reviewed-   Lab Results   Component Value Date    HGBA1C 6.5 (H) 01/25/2024     Most recent fingerstick glucose reviewed- No results for input(s): "POCTGLUCOSE" in the last 24 hours.  Current correctional scale  High  Maintain anti-hyperglycemic dose as follows-   Antihyperglycemics (From admission, onward)      Start     Stop Route Frequency Ordered    03/14/24 1819  insulin aspart U-100 pen 0-5 Units         -- SubQ Before meals & nightly PRN 03/14/24 1720          Hold Oral hypoglycemics while patient is in the hospital.    Essential hypertension  Chronic, uncontrolled. Latest blood pressure and vitals reviewed-     Temp:  [98.2 °F (36.8 °C)-98.9 °F (37.2 °C)]   Pulse:  [65-81]   Resp:  [18]   BP: (119-150)/(62-92)   SpO2:  [97 %-100 %] .   Home meds for hypertension were reviewed and noted below.       While in the hospital, " will manage blood pressure as follows; Continue home antihypertensive regimen    Will utilize p.r.n. blood pressure medication only if patient's blood pressure greater than 180/110 and he develops symptoms such as worsening chest pain or shortness of breath.    Cervical spinal stenosis  See above      Spinal cord compression  See above      Paresthesia and pain of both upper extremities  See above  For surgical intervention on Tuesday        VTE Risk Mitigation (From admission, onward)           Ordered     IP VTE HIGH RISK PATIENT  Once         03/14/24 1720     Place sequential compression device  Until discontinued         03/14/24 1720                    Discharge Planning   MAXIMILIANO: 3/21/2024     Code Status: Full Code   Is the patient medically ready for discharge?:     Reason for patient still in hospital (select all that apply): Treatment  Discharge Plan A: Home with family   Discharge Delays: None known at this time              Marito Saldivar MD  Department of Hospital Medicine   [facility]

## 2024-03-19 ENCOUNTER — ANESTHESIA EVENT (OUTPATIENT)
Dept: SURGERY | Facility: HOSPITAL | Age: 54
DRG: 472 | End: 2024-03-19
Payer: MEDICAID

## 2024-03-19 ENCOUNTER — ANESTHESIA (OUTPATIENT)
Dept: SURGERY | Facility: HOSPITAL | Age: 54
DRG: 472 | End: 2024-03-19
Payer: MEDICAID

## 2024-03-19 LAB
ALBUMIN SERPL BCP-MCNC: 3.6 G/DL (ref 3.5–5.2)
ALP SERPL-CCNC: 63 U/L (ref 55–135)
ALT SERPL W/O P-5'-P-CCNC: 22 U/L (ref 10–44)
ANION GAP SERPL CALC-SCNC: 6 MMOL/L (ref 8–16)
AST SERPL-CCNC: 16 U/L (ref 10–40)
BASOPHILS # BLD AUTO: 0.04 K/UL (ref 0–0.2)
BASOPHILS NFR BLD: 0.6 % (ref 0–1.9)
BILIRUB SERPL-MCNC: 0.4 MG/DL (ref 0.1–1)
BUN SERPL-MCNC: 27 MG/DL (ref 6–20)
CALCIUM SERPL-MCNC: 9 MG/DL (ref 8.7–10.5)
CHLORIDE SERPL-SCNC: 104 MMOL/L (ref 95–110)
CO2 SERPL-SCNC: 25 MMOL/L (ref 23–29)
CREAT SERPL-MCNC: 1.3 MG/DL (ref 0.5–1.4)
DIFFERENTIAL METHOD BLD: ABNORMAL
EOSINOPHIL # BLD AUTO: 0.2 K/UL (ref 0–0.5)
EOSINOPHIL NFR BLD: 3 % (ref 0–8)
ERYTHROCYTE [DISTWIDTH] IN BLOOD BY AUTOMATED COUNT: 12.1 % (ref 11.5–14.5)
EST. GFR  (NO RACE VARIABLE): >60 ML/MIN/1.73 M^2
GLUCOSE SERPL-MCNC: 131 MG/DL (ref 70–110)
GLUCOSE SERPL-MCNC: 141 MG/DL (ref 70–110)
GLUCOSE SERPL-MCNC: 154 MG/DL (ref 70–110)
GLUCOSE SERPL-MCNC: 156 MG/DL (ref 70–110)
GLUCOSE SERPL-MCNC: 166 MG/DL (ref 70–110)
HCT VFR BLD AUTO: 32.8 % (ref 40–54)
HGB BLD-MCNC: 9.9 G/DL (ref 14–18)
IMM GRANULOCYTES # BLD AUTO: 0.05 K/UL (ref 0–0.04)
IMM GRANULOCYTES NFR BLD AUTO: 0.8 % (ref 0–0.5)
LYMPHOCYTES # BLD AUTO: 2.5 K/UL (ref 1–4.8)
LYMPHOCYTES NFR BLD: 36.8 % (ref 18–48)
MAGNESIUM SERPL-MCNC: 1.7 MG/DL (ref 1.6–2.6)
MCH RBC QN AUTO: 25.1 PG (ref 27–31)
MCHC RBC AUTO-ENTMCNC: 30.2 G/DL (ref 32–36)
MCV RBC AUTO: 83 FL (ref 82–98)
MONOCYTES # BLD AUTO: 0.6 K/UL (ref 0.3–1)
MONOCYTES NFR BLD: 9 % (ref 4–15)
NEUTROPHILS # BLD AUTO: 3.3 K/UL (ref 1.8–7.7)
NEUTROPHILS NFR BLD: 49.8 % (ref 38–73)
NRBC BLD-RTO: 0 /100 WBC
PLATELET # BLD AUTO: 245 K/UL (ref 150–450)
PMV BLD AUTO: 9.4 FL (ref 9.2–12.9)
POTASSIUM SERPL-SCNC: 4 MMOL/L (ref 3.5–5.1)
PROT SERPL-MCNC: 6.5 G/DL (ref 6–8.4)
RBC # BLD AUTO: 3.94 M/UL (ref 4.6–6.2)
SODIUM SERPL-SCNC: 135 MMOL/L (ref 136–145)
WBC # BLD AUTO: 6.66 K/UL (ref 3.9–12.7)

## 2024-03-19 PROCEDURE — 36415 COLL VENOUS BLD VENIPUNCTURE: CPT | Performed by: NURSE PRACTITIONER

## 2024-03-19 PROCEDURE — 71000039 HC RECOVERY, EACH ADD'L HOUR: Performed by: NEUROLOGICAL SURGERY

## 2024-03-19 PROCEDURE — 01N10ZZ RELEASE CERVICAL NERVE, OPEN APPROACH: ICD-10-PCS | Performed by: NEUROLOGICAL SURGERY

## 2024-03-19 PROCEDURE — 36000711: Performed by: NEUROLOGICAL SURGERY

## 2024-03-19 PROCEDURE — D9220A PRA ANESTHESIA: Mod: CRNA,,, | Performed by: NURSE ANESTHETIST, CERTIFIED REGISTERED

## 2024-03-19 PROCEDURE — 63600175 PHARM REV CODE 636 W HCPCS: Performed by: NURSE ANESTHETIST, CERTIFIED REGISTERED

## 2024-03-19 PROCEDURE — 94761 N-INVAS EAR/PLS OXIMETRY MLT: CPT

## 2024-03-19 PROCEDURE — 83735 ASSAY OF MAGNESIUM: CPT | Performed by: NURSE PRACTITIONER

## 2024-03-19 PROCEDURE — 85025 COMPLETE CBC W/AUTO DIFF WBC: CPT | Performed by: NURSE PRACTITIONER

## 2024-03-19 PROCEDURE — 63600175 PHARM REV CODE 636 W HCPCS: Performed by: HEALTH CARE PROVIDER

## 2024-03-19 PROCEDURE — 22845 INSERT SPINE FIXATION DEVICE: CPT | Mod: 59,,, | Performed by: NEUROLOGICAL SURGERY

## 2024-03-19 PROCEDURE — 22551 ARTHRD ANT NTRBDY CERVICAL: CPT | Mod: ,,, | Performed by: NEUROLOGICAL SURGERY

## 2024-03-19 PROCEDURE — D9220A PRA ANESTHESIA: Mod: ANES,,, | Performed by: ANESTHESIOLOGY

## 2024-03-19 PROCEDURE — 63600175 PHARM REV CODE 636 W HCPCS: Performed by: ANESTHESIOLOGY

## 2024-03-19 PROCEDURE — 22853 INSJ BIOMECHANICAL DEVICE: CPT | Mod: ,,, | Performed by: NEUROLOGICAL SURGERY

## 2024-03-19 PROCEDURE — 25000003 PHARM REV CODE 250: Performed by: ANESTHESIOLOGY

## 2024-03-19 PROCEDURE — 36000710: Performed by: NEUROLOGICAL SURGERY

## 2024-03-19 PROCEDURE — 27800903 OPTIME MED/SURG SUP & DEVICES OTHER IMPLANTS: Performed by: NEUROLOGICAL SURGERY

## 2024-03-19 PROCEDURE — 20936 SP BONE AGRFT LOCAL ADD-ON: CPT | Mod: ,,, | Performed by: NEUROLOGICAL SURGERY

## 2024-03-19 PROCEDURE — 25000003 PHARM REV CODE 250: Performed by: HEALTH CARE PROVIDER

## 2024-03-19 PROCEDURE — 80053 COMPREHEN METABOLIC PANEL: CPT | Performed by: NURSE PRACTITIONER

## 2024-03-19 PROCEDURE — 63600175 PHARM REV CODE 636 W HCPCS: Performed by: NEUROLOGICAL SURGERY

## 2024-03-19 PROCEDURE — 25000003 PHARM REV CODE 250: Performed by: NURSE PRACTITIONER

## 2024-03-19 PROCEDURE — 27000221 HC OXYGEN, UP TO 24 HOURS

## 2024-03-19 PROCEDURE — 20930 SP BONE ALGRFT MORSEL ADD-ON: CPT | Mod: ,,, | Performed by: NEUROLOGICAL SURGERY

## 2024-03-19 PROCEDURE — 27201423 OPTIME MED/SURG SUP & DEVICES STERILE SUPPLY: Performed by: NEUROLOGICAL SURGERY

## 2024-03-19 PROCEDURE — 71000033 HC RECOVERY, INTIAL HOUR: Performed by: NEUROLOGICAL SURGERY

## 2024-03-19 PROCEDURE — 22552 ARTHRD ANT NTRBD CERVICAL EA: CPT | Mod: ,,, | Performed by: NEUROLOGICAL SURGERY

## 2024-03-19 PROCEDURE — 0RT30ZZ RESECTION OF CERVICAL VERTEBRAL DISC, OPEN APPROACH: ICD-10-PCS | Performed by: NEUROLOGICAL SURGERY

## 2024-03-19 PROCEDURE — 25000003 PHARM REV CODE 250: Performed by: NURSE ANESTHETIST, CERTIFIED REGISTERED

## 2024-03-19 PROCEDURE — 37000008 HC ANESTHESIA 1ST 15 MINUTES: Performed by: NEUROLOGICAL SURGERY

## 2024-03-19 PROCEDURE — 0RG20A0 FUSION OF 2 OR MORE CERVICAL VERTEBRAL JOINTS WITH INTERBODY FUSION DEVICE, ANTERIOR APPROACH, ANTERIOR COLUMN, OPEN APPROACH: ICD-10-PCS | Performed by: NEUROLOGICAL SURGERY

## 2024-03-19 PROCEDURE — 36620 INSERTION CATHETER ARTERY: CPT | Mod: 59,,, | Performed by: ANESTHESIOLOGY

## 2024-03-19 PROCEDURE — C1713 ANCHOR/SCREW BN/BN,TIS/BN: HCPCS | Performed by: NEUROLOGICAL SURGERY

## 2024-03-19 PROCEDURE — 21000000 HC CCU ICU ROOM CHARGE

## 2024-03-19 PROCEDURE — 25000003 PHARM REV CODE 250: Performed by: NEUROLOGICAL SURGERY

## 2024-03-19 PROCEDURE — 37000009 HC ANESTHESIA EA ADD 15 MINS: Performed by: NEUROLOGICAL SURGERY

## 2024-03-19 DEVICE — PUTTY I-FACTOR PEPTIDE 1CC: Type: IMPLANTABLE DEVICE | Site: NECK | Status: FUNCTIONAL

## 2024-03-19 DEVICE — IMPLANTABLE DEVICE: Type: IMPLANTABLE DEVICE | Site: NECK | Status: FUNCTIONAL

## 2024-03-19 RX ORDER — TIZANIDINE 4 MG/1
4 TABLET ORAL EVERY 8 HOURS
Status: DISCONTINUED | OUTPATIENT
Start: 2024-03-19 | End: 2024-03-20

## 2024-03-19 RX ORDER — OXYCODONE HYDROCHLORIDE 5 MG/1
5 TABLET ORAL EVERY 4 HOURS PRN
Status: DISCONTINUED | OUTPATIENT
Start: 2024-03-19 | End: 2024-03-20 | Stop reason: HOSPADM

## 2024-03-19 RX ORDER — OXYCODONE AND ACETAMINOPHEN 7.5; 325 MG/1; MG/1
1 TABLET ORAL EVERY 6 HOURS PRN
Qty: 28 TABLET | Refills: 0 | Status: SHIPPED | OUTPATIENT
Start: 2024-03-19 | End: 2024-03-27

## 2024-03-19 RX ORDER — MEPERIDINE HYDROCHLORIDE 50 MG/ML
12.5 INJECTION INTRAMUSCULAR; INTRAVENOUS; SUBCUTANEOUS EVERY 10 MIN PRN
Status: ACTIVE | OUTPATIENT
Start: 2024-03-19 | End: 2024-03-19

## 2024-03-19 RX ORDER — LIDOCAINE HYDROCHLORIDE 20 MG/ML
JELLY TOPICAL
Status: DISCONTINUED | OUTPATIENT
Start: 2024-03-19 | End: 2024-03-19

## 2024-03-19 RX ORDER — TIZANIDINE 4 MG/1
4 TABLET ORAL EVERY 8 HOURS
Qty: 63 TABLET | Refills: 0 | Status: SHIPPED | OUTPATIENT
Start: 2024-03-19 | End: 2024-04-10

## 2024-03-19 RX ORDER — OXYCODONE HYDROCHLORIDE 5 MG/1
5 TABLET ORAL
Status: DISCONTINUED | OUTPATIENT
Start: 2024-03-19 | End: 2024-03-20 | Stop reason: HOSPADM

## 2024-03-19 RX ORDER — PROPOFOL 10 MG/ML
VIAL (ML) INTRAVENOUS
Status: DISCONTINUED | OUTPATIENT
Start: 2024-03-19 | End: 2024-03-19

## 2024-03-19 RX ORDER — MUPIROCIN 20 MG/G
OINTMENT TOPICAL 2 TIMES DAILY
Status: DISCONTINUED | OUTPATIENT
Start: 2024-03-19 | End: 2024-03-20 | Stop reason: HOSPADM

## 2024-03-19 RX ORDER — BUPIVACAINE HCL/EPINEPHRINE 0.25-.0005
VIAL (ML) INJECTION
Status: DISCONTINUED | OUTPATIENT
Start: 2024-03-19 | End: 2024-03-19 | Stop reason: HOSPADM

## 2024-03-19 RX ORDER — PHENYLEPHRINE HYDROCHLORIDE 10 MG/ML
INJECTION INTRAVENOUS
Status: DISCONTINUED | OUTPATIENT
Start: 2024-03-19 | End: 2024-03-19

## 2024-03-19 RX ORDER — ONDANSETRON HYDROCHLORIDE 2 MG/ML
4 INJECTION, SOLUTION INTRAVENOUS DAILY PRN
Status: DISCONTINUED | OUTPATIENT
Start: 2024-03-19 | End: 2024-03-20

## 2024-03-19 RX ORDER — KETAMINE HYDROCHLORIDE 50 MG/ML
INJECTION, SOLUTION INTRAMUSCULAR; INTRAVENOUS
Status: DISCONTINUED | OUTPATIENT
Start: 2024-03-19 | End: 2024-03-19

## 2024-03-19 RX ORDER — ALUMINUM HYDROXIDE, MAGNESIUM HYDROXIDE, AND SIMETHICONE 1200; 120; 1200 MG/30ML; MG/30ML; MG/30ML
30 SUSPENSION ORAL EVERY 4 HOURS PRN
Status: DISCONTINUED | OUTPATIENT
Start: 2024-03-19 | End: 2024-03-20 | Stop reason: HOSPADM

## 2024-03-19 RX ORDER — DIPHENHYDRAMINE HYDROCHLORIDE 50 MG/ML
12.5 INJECTION INTRAMUSCULAR; INTRAVENOUS
Status: DISCONTINUED | OUTPATIENT
Start: 2024-03-19 | End: 2024-03-20

## 2024-03-19 RX ORDER — LIDOCAINE HYDROCHLORIDE 20 MG/ML
INJECTION INTRAVENOUS
Status: DISCONTINUED | OUTPATIENT
Start: 2024-03-19 | End: 2024-03-19

## 2024-03-19 RX ORDER — METHYLPREDNISOLONE ACETATE 80 MG/ML
INJECTION, SUSPENSION INTRA-ARTICULAR; INTRALESIONAL; INTRAMUSCULAR; SOFT TISSUE
Status: DISCONTINUED | OUTPATIENT
Start: 2024-03-19 | End: 2024-03-19 | Stop reason: HOSPADM

## 2024-03-19 RX ORDER — BISACODYL 10 MG/1
10 SUPPOSITORY RECTAL DAILY
Status: DISCONTINUED | OUTPATIENT
Start: 2024-03-20 | End: 2024-03-20 | Stop reason: HOSPADM

## 2024-03-19 RX ORDER — CEFAZOLIN SODIUM 1 G/3ML
INJECTION, POWDER, FOR SOLUTION INTRAMUSCULAR; INTRAVENOUS
Status: DISCONTINUED | OUTPATIENT
Start: 2024-03-19 | End: 2024-03-19

## 2024-03-19 RX ORDER — OXYCODONE AND ACETAMINOPHEN 7.5; 325 MG/1; MG/1
1 TABLET ORAL EVERY 6 HOURS
Status: DISCONTINUED | OUTPATIENT
Start: 2024-03-20 | End: 2024-03-20 | Stop reason: HOSPADM

## 2024-03-19 RX ORDER — ROCURONIUM BROMIDE 10 MG/ML
INJECTION, SOLUTION INTRAVENOUS
Status: DISCONTINUED | OUTPATIENT
Start: 2024-03-19 | End: 2024-03-19

## 2024-03-19 RX ORDER — ONDANSETRON HYDROCHLORIDE 2 MG/ML
INJECTION, SOLUTION INTRAVENOUS
Status: DISCONTINUED | OUTPATIENT
Start: 2024-03-19 | End: 2024-03-19

## 2024-03-19 RX ORDER — HYDROMORPHONE HYDROCHLORIDE 1 MG/ML
0.2 INJECTION, SOLUTION INTRAMUSCULAR; INTRAVENOUS; SUBCUTANEOUS EVERY 5 MIN PRN
Status: DISCONTINUED | OUTPATIENT
Start: 2024-03-19 | End: 2024-03-20 | Stop reason: HOSPADM

## 2024-03-19 RX ORDER — AMOXICILLIN 250 MG
2 CAPSULE ORAL NIGHTLY PRN
Status: DISCONTINUED | OUTPATIENT
Start: 2024-03-19 | End: 2024-03-20 | Stop reason: HOSPADM

## 2024-03-19 RX ORDER — ONDANSETRON 4 MG/1
8 TABLET, ORALLY DISINTEGRATING ORAL EVERY 6 HOURS PRN
Status: DISCONTINUED | OUTPATIENT
Start: 2024-03-19 | End: 2024-03-20 | Stop reason: HOSPADM

## 2024-03-19 RX ORDER — DEXMEDETOMIDINE HYDROCHLORIDE 100 UG/ML
INJECTION, SOLUTION INTRAVENOUS
Status: DISCONTINUED | OUTPATIENT
Start: 2024-03-19 | End: 2024-03-19

## 2024-03-19 RX ORDER — DEXAMETHASONE SODIUM PHOSPHATE 4 MG/ML
2 INJECTION, SOLUTION INTRA-ARTICULAR; INTRALESIONAL; INTRAMUSCULAR; INTRAVENOUS; SOFT TISSUE EVERY 6 HOURS
Status: DISCONTINUED | OUTPATIENT
Start: 2024-03-20 | End: 2024-03-20 | Stop reason: HOSPADM

## 2024-03-19 RX ORDER — HYDROMORPHONE HYDROCHLORIDE 1 MG/ML
2 INJECTION, SOLUTION INTRAMUSCULAR; INTRAVENOUS; SUBCUTANEOUS
Status: DISCONTINUED | OUTPATIENT
Start: 2024-03-19 | End: 2024-03-20 | Stop reason: HOSPADM

## 2024-03-19 RX ORDER — SODIUM CHLORIDE, SODIUM LACTATE, POTASSIUM CHLORIDE, CALCIUM CHLORIDE 600; 310; 30; 20 MG/100ML; MG/100ML; MG/100ML; MG/100ML
INJECTION, SOLUTION INTRAVENOUS CONTINUOUS PRN
Status: DISCONTINUED | OUTPATIENT
Start: 2024-03-19 | End: 2024-03-19

## 2024-03-19 RX ORDER — MIDAZOLAM HYDROCHLORIDE 1 MG/ML
INJECTION INTRAMUSCULAR; INTRAVENOUS
Status: DISCONTINUED | OUTPATIENT
Start: 2024-03-19 | End: 2024-03-19

## 2024-03-19 RX ORDER — FENTANYL CITRATE 50 UG/ML
INJECTION, SOLUTION INTRAMUSCULAR; INTRAVENOUS
Status: DISCONTINUED | OUTPATIENT
Start: 2024-03-19 | End: 2024-03-19

## 2024-03-19 RX ORDER — CEFADROXIL 500 MG/1
500 CAPSULE ORAL EVERY 12 HOURS
Qty: 10 CAPSULE | Refills: 0 | Status: SHIPPED | OUTPATIENT
Start: 2024-03-19 | End: 2024-03-25

## 2024-03-19 RX ORDER — ACETAMINOPHEN 10 MG/ML
INJECTION, SOLUTION INTRAVENOUS
Status: DISCONTINUED | OUTPATIENT
Start: 2024-03-19 | End: 2024-03-19

## 2024-03-19 RX ORDER — DEXAMETHASONE SODIUM PHOSPHATE 4 MG/ML
INJECTION, SOLUTION INTRA-ARTICULAR; INTRALESIONAL; INTRAMUSCULAR; INTRAVENOUS; SOFT TISSUE
Status: DISCONTINUED | OUTPATIENT
Start: 2024-03-19 | End: 2024-03-19

## 2024-03-19 RX ORDER — PROCHLORPERAZINE EDISYLATE 5 MG/ML
5 INJECTION INTRAMUSCULAR; INTRAVENOUS EVERY 6 HOURS PRN
Status: DISCONTINUED | OUTPATIENT
Start: 2024-03-19 | End: 2024-03-20

## 2024-03-19 RX ORDER — SUCCINYLCHOLINE CHLORIDE 20 MG/ML
INJECTION INTRAMUSCULAR; INTRAVENOUS
Status: DISCONTINUED | OUTPATIENT
Start: 2024-03-19 | End: 2024-03-19

## 2024-03-19 RX ORDER — SENNOSIDES 8.6 MG/1
1 TABLET ORAL DAILY
Qty: 14 TABLET | Refills: 0 | Status: SHIPPED | OUTPATIENT
Start: 2024-03-19 | End: 2024-04-03

## 2024-03-19 RX ADMIN — AMLODIPINE BESYLATE 10 MG: 5 TABLET ORAL at 09:03

## 2024-03-19 RX ADMIN — MIDAZOLAM HYDROCHLORIDE 2 MG: 1 INJECTION, SOLUTION INTRAMUSCULAR; INTRAVENOUS at 03:03

## 2024-03-19 RX ADMIN — PHENYLEPHRINE HYDROCHLORIDE 200 MCG: 10 INJECTION INTRAVENOUS at 04:03

## 2024-03-19 RX ADMIN — SODIUM CHLORIDE, SODIUM LACTATE, POTASSIUM CHLORIDE, AND CALCIUM CHLORIDE: .6; .31; .03; .02 INJECTION, SOLUTION INTRAVENOUS at 03:03

## 2024-03-19 RX ADMIN — CEFAZOLIN 3 G: 330 INJECTION, POWDER, FOR SOLUTION INTRAMUSCULAR; INTRAVENOUS at 03:03

## 2024-03-19 RX ADMIN — DEXAMETHASONE SODIUM PHOSPHATE 12 MG: 4 INJECTION, SOLUTION INTRAMUSCULAR; INTRAVENOUS at 04:03

## 2024-03-19 RX ADMIN — LIDOCAINE HYDROCHLORIDE 100 MG: 20 INJECTION, SOLUTION INTRAVENOUS at 03:03

## 2024-03-19 RX ADMIN — PROPOFOL 100 MCG/KG/MIN: 10 INJECTION, EMULSION INTRAVENOUS at 03:03

## 2024-03-19 RX ADMIN — KETAMINE HYDROCHLORIDE 20 MG: 50 INJECTION INTRAMUSCULAR; INTRAVENOUS at 04:03

## 2024-03-19 RX ADMIN — SODIUM CHLORIDE, SODIUM LACTATE, POTASSIUM CHLORIDE, AND CALCIUM CHLORIDE: .6; .31; .03; .02 INJECTION, SOLUTION INTRAVENOUS at 04:03

## 2024-03-19 RX ADMIN — Medication 160 MG: at 03:03

## 2024-03-19 RX ADMIN — PHENYLEPHRINE HYDROCHLORIDE 200 MCG: 10 INJECTION INTRAVENOUS at 03:03

## 2024-03-19 RX ADMIN — HYDROMORPHONE HYDROCHLORIDE 0.2 MG: 1 INJECTION, SOLUTION INTRAMUSCULAR; INTRAVENOUS; SUBCUTANEOUS at 08:03

## 2024-03-19 RX ADMIN — ONDANSETRON 4 MG: 2 INJECTION INTRAMUSCULAR; INTRAVENOUS at 03:03

## 2024-03-19 RX ADMIN — SODIUM CHLORIDE, SODIUM LACTATE, POTASSIUM CHLORIDE, AND CALCIUM CHLORIDE: .6; .31; .03; .02 INJECTION, SOLUTION INTRAVENOUS at 05:03

## 2024-03-19 RX ADMIN — ACETAMINOPHEN 1000 MG: 10 INJECTION, SOLUTION INTRAVENOUS at 03:03

## 2024-03-19 RX ADMIN — PROPOFOL 150 MG: 10 INJECTION, EMULSION INTRAVENOUS at 03:03

## 2024-03-19 RX ADMIN — LIDOCAINE HYDROCHLORIDE 3 ML: 20 JELLY TOPICAL at 03:03

## 2024-03-19 RX ADMIN — GLYCOPYRROLATE 0.3 MG: 0.2 INJECTION, SOLUTION INTRAMUSCULAR; INTRAVITREAL at 06:03

## 2024-03-19 RX ADMIN — FENTANYL CITRATE 100 MCG: 50 INJECTION, SOLUTION INTRAMUSCULAR; INTRAVENOUS at 03:03

## 2024-03-19 RX ADMIN — GABAPENTIN 300 MG: 300 CAPSULE ORAL at 09:03

## 2024-03-19 RX ADMIN — ROCURONIUM BROMIDE 10 MG: 10 INJECTION, SOLUTION INTRAVENOUS at 03:03

## 2024-03-19 RX ADMIN — DEXMEDETOMIDINE HYDROCHLORIDE 20 MCG: 100 INJECTION, SOLUTION INTRAVENOUS at 04:03

## 2024-03-19 RX ADMIN — OXYCODONE HYDROCHLORIDE 5 MG: 5 TABLET ORAL at 08:03

## 2024-03-19 RX ADMIN — OXYCODONE HYDROCHLORIDE AND ACETAMINOPHEN 1 TABLET: 7.5; 325 TABLET ORAL at 11:03

## 2024-03-19 RX ADMIN — DEXAMETHASONE SODIUM PHOSPHATE 2 MG: 4 INJECTION, SOLUTION INTRA-ARTICULAR; INTRALESIONAL; INTRAMUSCULAR; INTRAVENOUS; SOFT TISSUE at 11:03

## 2024-03-19 NOTE — PT/OT/SLP PROGRESS
Occupational Therapy      Patient Name:  Obed Alfred   MRN:  [id number]    Patient not seen today secondary to surgery. Will follow-up and await new orders.    3/19/2024

## 2024-03-19 NOTE — OP NOTE
Date of procedure:  March 19, 2024    Preoperative diagnosis:    1. Cervical spinal stenosis    2. Cervical degenerative disc disease     3. Cervical myelopathy     4. Obesity    Postoperative diagnosis:    1. Same     Procedures:    1. Anterior cervical diskectomy C3-4, C4-5     3. Anterior cervical interbody arthrodesis C3-4, C4-5    4. Insertion of biomechanical device C3-4, C4-5     5. Anterior cervical plating C3-C5     6. Harvesting local autograft    7. Implantation of allograft    8. Intraoperative neuro monitoring    Surgeon:  Taz Clayton D.O., MBA    Assistant:  See medical record     Anesthesia:  General endotracheal      EBL: 200cc    Specimens:  None     Complications:  None      Findings: Diminished motor and somatosensory potentials both pre and post positioning.  No intraoperative changes..     Operative procedure:     The patient was brought to the operating room where adequate IV access was obtained.  Upon anesthesia induction, the patient was gently endotracheally intubated using a glide scope while maintaining the cervical spine in neutral position. A radial arterial line was placed per anesthesia.  A Carlin catheter was placed without difficulty.  Neuro monitoring leads were placed in the usual fashion, including erbs point bilaterally.  Pre positioning motor and somatosensory evoked potentials were established in all extremities.  The patient was then positioned supine on the operative table with the cervical spine gently extended.  Following surgical positioning, neuro monitoring potentials were unchanged.  All bony prominences were padded.  Eye goggles were applied. The shoulders were gently retracted inferiorly with surgical tape. The anterior cervical region was clipped prepped and draped in sterile fashion.  The C-arm was draped in sterile fashion and brought to the operative field.  Using intraoperative fluoroscopy the C3-4, C4-5 disc spaces were identified  A surgical time-out was  performed.  The patient received IV antibiotics within 1 hour of incision.  Lidocaine with epinephrine was injected at the incision site.  A 10 blade scalpel was used to make a oblique incision based on the medial border of the right sternocleidomastoid muscle.  Self retaining retractors were placed.  Sharp dissection was carried along the medial border of the sternocleidomastoid muscle.  The carotid sheath was identified and maintained lateral to the plane of dissection.  Sharp dissection was continued  through the pretracheal and prevertebral fascia.  The C3-4 C4-5 disc spaces were confirmed with intraoperative fluoroscopy.   The longus coli muscles were elevated bilaterally with electrocautery.  Self retaining retractors were placed overlying C3-4 disc space.  [place] distracting pins were placed in the midportion of C3 and C4 vertebral bodies.  The microscope was draped and brought to the operative field.   Posterior osteophytes were resected.  The C3-4 disc was completely removed. The posterior longitudinal ligament was elevated and resected in piecemeal fashion.  Large subligamentous disc fragments compressing the cord were carefully elevated and resected.  Bilateral foraminotomies were completed at C3-4. The spinal canal was inspected and adequately decompressed circumferentially.  Meticulous hemostasis was obtained.  The spinal cord and exiting nerve roots were inspected under microscopic vision for other areas of compression and none was identified.  A Medtronic Titan titanium interbody device was sized and packed with autograft and allograft. The interbody device was then placed in the C3-4 interbody space with adequate positioning confirmed via fluoroscopy.  Motor-evoked potentials repeated in stable The retractor and distracting pins were then placed overlying the C4-5 disc and a complete diskectomy was performed. Endplate preparation was carried out with a high-speed bur.  The posterior longitudinal  ligament was elevated and resected.  Bilateral foraminotomies at C4-5 were completed.  The neural elements were inspected for areas of compression at C4-5 and none was identified.  A Medtronic titanium interbody device was packed with autograft and allograft and placed in the C4-5 disc space.  Fluoroscopy confirmed adequate positioning of the interbody device at C4-5  motor-evoked potentials repeated and stable.   Motor-evoked potentials were repeated and stable.  A  Medtronic Zevo anterior cervical plate was then sized, contoured, and afixed to the anterior spinal column extending from C3-C5 using 4 mm self drilling screws.  Intermittent lateral and AP fluoroscopic images were utilized to ensure adequate implant positioning.  The wound was copiously irrigated.  Meticulous hemostasis was then again established.  The esophagus and vital neck structures were carefully inspected under the microscope with no evidence of iatrogenic injury.  A soft round silicone perforated drain was placed along with anterior spinal column and tunneled remote to the incision site and secured. The platysma was reapproximated with running 3-0 Vicryl suture.  Subcutaneous tissue was reapproximated with 2-0 Vicryl suture in interrupted fashion.  The dermis was reapproximated with running 4-0 Monocryl.  A sterile surgical dressing was applied.  A rigid cervical orthosis was then placed on the patient. The patient was extubated in the operating room and transferred to the PACU in hemodynamically stable condition.  Sponge and needle counts were correct. Neuromonitoring potentials remained unchanged throughout the above procedures.

## 2024-03-19 NOTE — SUBJECTIVE & OBJECTIVE
Interval History:   Patient to have Fusion Surgery today    Review of Systems   Constitutional:  Negative for activity change, appetite change, fatigue and fever.   HENT:  Negative for rhinorrhea, sinus pressure and sinus pain.    Eyes:  Negative for photophobia, redness and visual disturbance.   Respiratory:  Negative for apnea, choking, shortness of breath and wheezing.    Cardiovascular:  Negative for chest pain, palpitations and leg swelling.   Gastrointestinal:  Negative for abdominal distention, abdominal pain, constipation, diarrhea and vomiting.   Endocrine: Negative for polydipsia, polyphagia and polyuria.   Genitourinary:  Negative for dysuria, frequency, hematuria and urgency.   Musculoskeletal:  Positive for arthralgias and back pain. Negative for myalgias.   Skin:  Negative for pallor, rash and wound.   Neurological:  Negative for seizures, light-headedness and headaches.   Psychiatric/Behavioral:  Negative for agitation, confusion and sleep disturbance. The patient is not nervous/anxious.      Objective:     Vital Signs (Most Recent):  Temp: 97.8 °F (36.6 °C) (03/19/24 1100)  Pulse: 65 (03/19/24 1100)  Resp: 18 (03/19/24 1100)  BP: (!) 146/79 (Notified Nurse) (03/19/24 1100)  SpO2: 98 % (03/19/24 1100) Vital Signs (24h Range):  Temp:  [97.7 °F (36.5 °C)-98.7 °F (37.1 °C)] 97.8 °F (36.6 °C)  Pulse:  [65-72] 65  Resp:  [18-19] 18  SpO2:  [98 %-99 %] 98 %  BP: (127-170)/(71-87) 146/79     Weight: (!) 137.9 kg (304 lb)  Body mass index is 44.89 kg/m².    Intake/Output Summary (Last 24 hours) at 3/19/2024 1853  Last data filed at 3/19/2024 1829  Gross per 24 hour   Intake 3250 ml   Output 295 ml   Net 2955 ml         Physical Exam  Constitutional:       General: He is not in acute distress.     Appearance: He is not ill-appearing or toxic-appearing.   HENT:      Right Ear: External ear normal.      Left Ear: External ear normal.      Nose: No congestion or rhinorrhea.   Eyes:      Conjunctiva/sclera:  Conjunctivae normal.   Cardiovascular:      Heart sounds: No murmur heard.  Pulmonary:      Effort: No respiratory distress.      Breath sounds: No stridor. No wheezing, rhonchi or rales.   Chest:      Chest wall: No tenderness.   Abdominal:      General: There is no distension.      Palpations: There is no mass.      Tenderness: There is no abdominal tenderness. There is no guarding or rebound.      Hernia: No hernia is present.   Musculoskeletal:         General: Tenderness present. No swelling, deformity or signs of injury.      Right lower leg: No edema.      Left lower leg: No edema.   Skin:     Coloration: Skin is not jaundiced or pale.      Findings: No bruising, erythema, lesion or rash.   Neurological:      Mental Status: He is alert and oriented to person, place, and time. Mental status is at baseline.             Significant Labs: All pertinent labs within the past 24 hours have been reviewed.  CBC:   Recent Labs   Lab 03/18/24  0508 03/19/24  0648   WBC 7.03  7.03 6.66   HGB 10.0*  10.0* 9.9*   HCT 32.4*  32.4* 32.8*     270 245     CMP:   Recent Labs   Lab 03/18/24  0508 03/19/24  0648     136 135*   K 4.3  4.3 4.0     104 104   CO2 25  25 25   *  116* 131*   BUN 30*  30* 27*   CREATININE 1.7*  1.7* 1.3   CALCIUM 9.0  9.0 9.0   PROT 6.8 6.5   ALBUMIN 3.8 3.6   BILITOT 0.5 0.4   ALKPHOS 68 63   AST 18 16   ALT 21 22   ANIONGAP 7*  7* 6*       Significant Imaging: I have reviewed all pertinent imaging results/findings within the past 24 hours.

## 2024-03-19 NOTE — PROGRESS NOTES
[facility]  Progress Note    Patient Name: Obed Alfred  MRN: [id number]  Patient Class: IP- Inpatient   Admission Date: 3/14/2024  Length of Stay: 3 days  Attending Physician: Marito Saldivar MD  Primary Care Provider: Juany Robertson MD        Subjective:     Principal Problem:Weakness of both upper extremities        HPI:  53 year old male with a past medical history of HTN, HLD, DM, anemia presents to the emergency room with reports of numbness and weakness to both upper extremities and hands that began 4 days ago. Reports started in both hands than radiated to elbows. 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 Reports resting makes it better and using arms makes it worse. Denies fever, chills, recent travel, URI symptoms, GI symptoms, rash, dysuria, abdominal pain, headache, recent trauma. Started new blood pressure of chlorthalidone a week ago.   In ER, CBC and CMP unremarkable. Troponin negative x2. B12 TSH within normal limits. EKG with 1st degree AV block, no ST elevation, CT c spine: no acute fracture, degenerative changes in cervical spine,  MRI c spine: C3-C4, there is a broad-based disc protrusion asymmetric to the right, with subligamentous extruded component. This results in severe spinal canal stenosis asymmetric to the right, with cervical cord compression. There is increased non fluid-like T2 signal in the cord suggesting edema and or myelopathy. There is severe bilateral foraminal narrowing. C4-C5, there is a broad-based central disc protrusion, which contacts the ventral cervical cord and produces moderate spinal canal stenosis. There is mild-to-moderate bilateral foraminal narrowing.  C5-C6, there is broad-based disc bulging, indenting the ventral thecal sac and producing mild spinal canal stenosis.  There is moderate right neural foraminal narrowing.At C6-C7, there is mild broad-based disc bulging  and mild spinal canal stenosis, without significant foraminal narrowing. MRI brain negative for acute ischemic changes. Admit to hospital medicine with consults to Neurology and Neurosurgery. Dilaudid and Tylenol given in ER.     Overview/Hospital Course:  Patient admitted to Hospital Medicine with numbness and weakness to both upper extremities and hands that began 4 days ago   He was evaluated by neurosurgeon.  Radiology imaging was consistent with C3-4 cord compression with ventral cord de formation at C4-by.  Dr. Clayton from neurosurgery recommended anterior cervical disc fusion surgery to be performed in next 3-5 days.  Patient received supportive care with gabapentin, intravenous narcotics and Robaxin.  A [place] J collar was placed. Later Neuro surgery seen and decided for fusion surgery on Tuesday and continue to hold all blood thinners    Patient was seen prior to his fusion surgery Patient had no complaints    Interval History:   Patient to have Fusion Surgery today    Review of Systems   Constitutional:  Negative for activity change, appetite change, fatigue and fever.   HENT:  Negative for rhinorrhea, sinus pressure and sinus pain.    Eyes:  Negative for photophobia, redness and visual disturbance.   Respiratory:  Negative for apnea, choking, shortness of breath and wheezing.    Cardiovascular:  Negative for chest pain, palpitations and leg swelling.   Gastrointestinal:  Negative for abdominal distention, abdominal pain, constipation, diarrhea and vomiting.   Endocrine: Negative for polydipsia, polyphagia and polyuria.   Genitourinary:  Negative for dysuria, frequency, hematuria and urgency.   Musculoskeletal:  Positive for arthralgias and back pain. Negative for myalgias.   Skin:  Negative for pallor, rash and wound.   Neurological:  Negative for seizures, light-headedness and headaches.   Psychiatric/Behavioral:  Negative for agitation, confusion and sleep disturbance. The patient is not nervous/anxious.       Objective:     Vital Signs (Most Recent):  Temp: 97.8 °F (36.6 °C) (03/19/24 1100)  Pulse: 65 (03/19/24 1100)  Resp: 18 (03/19/24 1100)  BP: (!) 146/79 (Notified Nurse) (03/19/24 1100)  SpO2: 98 % (03/19/24 1100) Vital Signs (24h Range):  Temp:  [97.7 °F (36.5 °C)-98.7 °F (37.1 °C)] 97.8 °F (36.6 °C)  Pulse:  [65-72] 65  Resp:  [18-19] 18  SpO2:  [98 %-99 %] 98 %  BP: (127-170)/(71-87) 146/79     Weight: (!) 137.9 kg (304 lb)  Body mass index is 44.89 kg/m².    Intake/Output Summary (Last 24 hours) at 3/19/2024 1853  Last data filed at 3/19/2024 1829  Gross per 24 hour   Intake 3250 ml   Output 295 ml   Net 2955 ml         Physical Exam  Constitutional:       General: He is not in acute distress.     Appearance: He is not ill-appearing or toxic-appearing.   HENT:      Right Ear: External ear normal.      Left Ear: External ear normal.      Nose: No congestion or rhinorrhea.   Eyes:      Conjunctiva/sclera: Conjunctivae normal.   Cardiovascular:      Heart sounds: No murmur heard.  Pulmonary:      Effort: No respiratory distress.      Breath sounds: No stridor. No wheezing, rhonchi or rales.   Chest:      Chest wall: No tenderness.   Abdominal:      General: There is no distension.      Palpations: There is no mass.      Tenderness: There is no abdominal tenderness. There is no guarding or rebound.      Hernia: No hernia is present.   Musculoskeletal:         General: Tenderness present. No swelling, deformity or signs of injury.      Right lower leg: No edema.      Left lower leg: No edema.   Skin:     Coloration: Skin is not jaundiced or pale.      Findings: No bruising, erythema, lesion or rash.   Neurological:      Mental Status: He is alert and oriented to person, place, and time. Mental status is at baseline.             Significant Labs: All pertinent labs within the past 24 hours have been reviewed.  CBC:   Recent Labs   Lab 03/18/24  0508 03/19/24  0648   WBC 7.03  7.03 6.66   HGB 10.0*  10.0* 9.9*  "  HCT 32.4*  32.4* 32.8*     270 245     CMP:   Recent Labs   Lab 03/18/24  0508 03/19/24  0648     136 135*   K 4.3  4.3 4.0     104 104   CO2 25  25 25   *  116* 131*   BUN 30*  30* 27*   CREATININE 1.7*  1.7* 1.3   CALCIUM 9.0  9.0 9.0   PROT 6.8 6.5   ALBUMIN 3.8 3.6   BILITOT 0.5 0.4   ALKPHOS 68 63   AST 18 16   ALT 21 22   ANIONGAP 7*  7* 6*       Significant Imaging: I have reviewed all pertinent imaging results/findings within the past 24 hours.    Assessment/Plan:      * Weakness of both upper extremities  Ataxia present  MRI brain negative for stroke, MRI C-spine Broad-based disc protrusion with subligamentous extruded component at C3-C4, producing severe spinal canal stenosis and cervical cord compression, with findings of cord edema and or myelopathy   Neurology and Neurosurgery consulted.   For surgical intervention Tuesday  Continue gabapentin and Robaxin use with fall precautions.  C collar   Dilaudid for pain control  PT/OT  Add  steroid if needed  For SL surgery on Tuesday by dr Clayton        Body mass index (BMI) of 33.0-33.9 in adult  Body mass index is 44.89 kg/m². Morbid obesity complicates all aspects of disease management from diagnostic modalities to treatment. Weight loss encouraged and health benefits explained to patient.       DM (diabetes mellitus)  Patient's FSGs are uncontrolled due to hyperglycemia on current medication regimen.  Last A1c reviewed-   Lab Results   Component Value Date    HGBA1C 6.5 (H) 01/25/2024     Most recent fingerstick glucose reviewed- No results for input(s): "POCTGLUCOSE" in the last 24 hours.  Current correctional scale  High  Maintain anti-hyperglycemic dose as follows-   Antihyperglycemics (From admission, onward)      Start     Stop Route Frequency Ordered    03/14/24 1819  insulin aspart U-100 pen 0-5 Units         -- SubQ Before meals & nightly PRN 03/14/24 1720          Hold Oral hypoglycemics while patient is in the " hospital.    Essential hypertension  Chronic, uncontrolled. Latest blood pressure and vitals reviewed-     Temp:  [98.2 °F (36.8 °C)-98.9 °F (37.2 °C)]   Pulse:  [65-81]   Resp:  [18]   BP: (119-150)/(62-92)   SpO2:  [97 %-100 %] .   Home meds for hypertension were reviewed and noted below.       While in the hospital, will manage blood pressure as follows; Continue home antihypertensive regimen    Will utilize p.r.n. blood pressure medication only if patient's blood pressure greater than 180/110 and he develops symptoms such as worsening chest pain or shortness of breath.    Cervical spinal stenosis  See above      Spinal cord compression  See above      Paresthesia and pain of both upper extremities  See above  For surgical intervention on Tuesday        VTE Risk Mitigation (From admission, onward)           Ordered     IP VTE HIGH RISK PATIENT  Once         03/14/24 1720     Place sequential compression device  Until discontinued         03/14/24 1720                    Discharge Planning   MAXIMILIANO: 3/21/2024     Code Status: Full Code   Is the patient medically ready for discharge?:     Reason for patient still in hospital (select all that apply): Treatment  Discharge Plan A: Home with family   Discharge Delays: None known at this time              Marito Saldivar MD  Department of Hospital Medicine   Pending sale to [facility]

## 2024-03-19 NOTE — ANESTHESIA PREPROCEDURE EVALUATION
03/19/2024  Obed Alfred is a 53 y.o., male.      Patient Active Problem List   Diagnosis    Weakness of both upper extremities    Paresthesia and pain of both upper extremities    Spinal cord compression    Cervical spinal stenosis    Essential hypertension    DM (diabetes mellitus)    Body mass index (BMI) of 33.0-33.9 in adult       No past surgical history on file.     Tobacco Use:  The patient  has no history on file for tobacco use.     Results for orders placed or performed during the hospital encounter of 03/14/24   EKG 12-lead    Collection Time: 03/14/24 11:03 AM   Result Value Ref Range    QRS Duration 128 ms    [facility] QTC Calculation 452 ms    Narrative    Test Reason : R07.9,    Vent. Rate : 068 BPM     Atrial Rate : 068 BPM     P-R Int : 218 ms          QRS Dur : 128 ms      QT Int : 426 ms       P-R-T Axes : 059 -28 002 degrees     QTc Int : 452 ms    Sinus rhythm with 1st degree A-V block  LVH with QRS widening  Abnormal ECG  No previous ECGs available    Referred By: [email or web address]   SELF           Confirmed By:         Imaging Results              CT Cervical Spine Without Contrast (Final result)  Result time 03/14/24 15:28:54      Final result by Jeanmarie Reeves MD (03/14/24 15:28:54)                   Narrative:    CMS MANDATED QUALITY DATA - CT RADIATION  436    All CT scans at this facility utilize dose modulation, iterative reconstruction, and/or weight based dosing when appropriate to reduce radiation dose to as low as reasonably achievable.    CLINICAL HISTORY:  53 years (1970) Male Spinal stenosis, cervical Numbness (BUE x 4 days)    TECHNIQUE:  CT CERVICAL SPINE WITHOUT IV CONTRAST. Contiguous thin section axial images were obtained of the cervical spine. Sagittal and coronal reformatted images were generated. Note that this exam is suboptimal for evaluation of disc disease  (which would be better evaluated on MRI) and does not assess for ligamentous injury or stability.    COMPARISON:  None available.    FINDINGS:  No acute fracture of the cervical spine. No traumatic malalignment of the cervical spine.  No evidence of significant intraspinal abnormality.  No perched, jumped or locked facets seen. Vertebral body heights are maintained. There is straightening of the normal lordotic curvature of the cervical spine. There is mild disc height loss throughout the cervical spine with moderate-sized adjacent endplate osteophytes from C2 through C7, with scattered facet arthropathy (for which this study is not tailored to assess). Visualized brain is unremarkable. Visualized lung apices are essentially clear.    IMPRESSION:  1. No acute fracture or traumatic malalignment of the cervical spine.  2. Straightening of the normal lordotic curvature likely secondary to combination of positioning, degenerative change and/or muscle spasm.  3. Scattered degenerative change throughout the cervical spine.                    .    Electronically signed by:  Jeanmarie Reeves MD  03/14/2024 03:28 PM CDT Workstation: [id number]                                     MRI Cervical Spine Without Contrast (Final result)  Result time 03/14/24 11:15:42      Final result by Houston Hayden MD (03/14/24 11:15:42)                   Impression:      1. Broad-based disc protrusion with subligamentous extruded component at C3-C4, producing severe spinal canal stenosis and cervical cord compression, with findings of cord edema and or myelopathy.  Neurosurgical consultation is recommended.  2. Additional multilevel degenerative disc disease, with spinal canal stenosis and neural foraminal narrowing as described.  3. Straightening of the normal cervical spinal curvature, which could be positional, or reflective of paraspinal muscular spasm.      Electronically signed by: Houston Hayden MD  Date:    03/14/2024  Time:    11:15                Narrative:    EXAMINATION:  MRI CERVICAL SPINE WITHOUT CONTRAST    CLINICAL HISTORY:  Cervical radiculopathy, no red flags;    TECHNIQUE:  Routine noncontrast MRI cervical spine.    FINDINGS:  No prior studies for comparison.  There is straightening of the normal cervical spinal curvature, with normal vertebral body heights and alignment, and no acute fractures or destructive osseous lesions.  Degenerative loss of disc height and signal involves all levels, with ventral osteophytes at multiple levels.  The visualized posterior fossa and cervicomedullary junction are normal.  Focal non fluid like intramedullary T2 hyperintensity involves the cervical cord at the C3-C4 level, with the cervical cord otherwise normal.    At C2-C3, there is no significant disc bulging or focal disc herniation, with mild spinal canal stenosis.    At C3-C4, there is a broad-based disc protrusion asymmetric to the right, with subligamentous extruded component.  This results in severe spinal canal stenosis asymmetric to the right, with cervical cord compression.  There is increased non fluid-like T2 signal in the cord suggesting edema and or myelopathy.  There is severe bilateral foraminal narrowing.    At C4-C5, there is a broad-based central disc protrusion, which contacts the ventral cervical cord and produces moderate spinal canal stenosis.  There is mild-to-moderate bilateral foraminal narrowing.    At C5-C6, there is broad-based disc bulging, indenting the ventral thecal sac and producing mild spinal canal stenosis.  There is moderate right neural foraminal narrowing.    At C6-C7, there is mild broad-based disc bulging and mild spinal canal stenosis, without significant foraminal narrowing.    At C7-T1, there is no significant abnormality.    There are no signal abnormalities of the paraspinal musculature or paraspinal soft tissues.  No enlarged cervical chain lymph nodes.                                       MRI Brain  Without Contrast (Final result)  Result time 03/14/24 11:07:04      Final result by Keith Tee MD (03/14/24 11:07:04)                   Impression:      Negative for acute ischemia or acute intracranial pathology.      Electronically signed by: Keith Tee MD  Date:    03/14/2024  Time:    11:07               Narrative:    EXAMINATION:  MRI BRAIN WITHOUT CONTRAST    CLINICAL HISTORY:  Stroke, follow up;    TECHNIQUE:  Multisequence, multiplanar imaging through the brain performed without IV contrast.    COMPARISON:  None    FINDINGS:  Diffusion-weighted imaging is negative for acute ischemia or focal lesion.  Gradient echo images show no abnormal intracranial susceptibility artifact.    No intracranial mass, midline shift, or mass effect.  No significant white matter signal abnormality.  Cerebellar hemispheres and brainstem are unremarkable.  Major intracranial T2 flow voids appear patent.    Mastoid air cells are hypoplastic and clear.  Paranasal sinuses are unremarkable.    Diminished T1 signal intensity of the calvarium and cervical spine, likely on the basis of red marrow reconversion.  Pituitary gland is unremarkable.                                       X-Ray Chest AP Portable (Final result)  Result time 03/14/24 10:58:06      Final result by Houston Hayden MD (03/14/24 10:58:06)                   Impression:      No evidence of active cardiopulmonary disease.      Electronically signed by: Houston Hayden MD  Date:    03/14/2024  Time:    10:58               Narrative:    EXAMINATION:  XR CHEST AP PORTABLE    CLINICAL HISTORY:  Chest Pain;    FINDINGS:  Portable chest radiograph at 0952 hours with no prior studies for comparison shows the cardiomediastinal silhouette and pulmonary vasculature are within normal limits.    The lungs are normally expanded, with no consolidation, pleural effusion, or evidence of pulmonary edema. No confluent infiltrates or pneumothorax. There are no significant osseous  abnormalities.                                       Lab Results   Component Value Date    WBC 7.03 03/18/2024    WBC 7.03 03/18/2024    HGB 10.0 (L) 03/18/2024    HGB 10.0 (L) 03/18/2024    HCT 32.4 (L) 03/18/2024    HCT 32.4 (L) 03/18/2024    MCV 84 03/18/2024    MCV 84 03/18/2024     03/18/2024     03/18/2024     BMP  Lab Results   Component Value Date     03/18/2024     03/18/2024    K 4.3 03/18/2024    K 4.3 03/18/2024     03/18/2024     03/18/2024    CO2 25 03/18/2024    CO2 25 03/18/2024    BUN 30 (H) 03/18/2024    BUN 30 (H) 03/18/2024    CREATININE 1.7 (H) 03/18/2024    CREATININE 1.7 (H) 03/18/2024    CALCIUM 9.0 03/18/2024    CALCIUM 9.0 03/18/2024    ANIONGAP 7 (L) 03/18/2024    ANIONGAP 7 (L) 03/18/2024     (H) 03/18/2024     (H) 03/18/2024     (H) 03/17/2024       No results found for this or any previous visit.          Pre-op Assessment    I have reviewed the Patient Summary Reports.     I have reviewed the Nursing Notes. I have reviewed the NPO Status.   I have reviewed the Medications.     Review of Systems  Anesthesia Hx:  No problems with previous Anesthesia             Denies Family Hx of Anesthesia complications.    Denies Personal Hx of Anesthesia complications.                    Social:  Non-Smoker       Hematology/Oncology:       -- Anemia:                                  EENT/Dental:  EENT/Dental Normal           Cardiovascular:     Hypertension                                        Pulmonary:  Pulmonary Normal                       Renal/:  Renal/ Normal                 Hepatic/GI:  Hepatic/GI Normal                 Musculoskeletal:         Spine Disorders: (spinal stenosis, arm weakness bilaterally.) cervical            Neurological:  Neurology Normal                                      Endocrine:  Diabetes           Psych:  Psychiatric Normal                    Physical Exam  General: Well  nourished    Airway:  Mallampati: IV   Mouth Opening: Normal  TM Distance: Normal  Tongue: Normal  Neck ROM: Normal ROM    Dental:  Intact    Chest/Lungs:  Clear to auscultation, Normal Respiratory Rate    Heart:  Rate: Normal  Rhythm: Regular Rhythm        Anesthesia Plan  Type of Anesthesia, risks & benefits discussed:    Anesthesia Type: Gen ETT  Intra-op Monitoring Plan: Standard ASA Monitors and Art Line  Post Op Pain Control Plan: IV/PO Opioids PRN and multimodal analgesia  Induction:  IV  Airway Plan: Video  Informed Consent: Informed consent signed with the Patient and all parties understand the risks and agree with anesthesia plan.  All questions answered.   ASA Score: 3  Anesthesia Plan Notes: GETA.  Minimal neck movement.  A-line.  Multimodal analgesia with ofirmev 1000mg, ketamine 50 mg and decadron 8 mg.  PONV prophylaxis with Pepcid 20 mg IV, and Zofran 4 mg IV.        Ready For Surgery From Anesthesia Perspective.     .

## 2024-03-19 NOTE — ANESTHESIA PROCEDURE NOTES
Arterial    Diagnosis: Spinal stenosis    Patient location during procedure: done in OR  Timeout: 3/19/2024 3:35 PM  Procedure end time: 3/19/2024 3:40 PM    Staffing  Authorizing Provider: Richardson Hickman MD  Performing Provider: Richardson Hickman MD    Staffing  Performed by: Richardson Hickman MD  Authorized by: Richardson Hickman MD    Anesthesiologist was present at the time of the procedure.    Preanesthetic Checklist  Completed: patient identified, IV checked, site marked, risks and benefits discussed, surgical consent, monitors and equipment checked, pre-op evaluation, timeout performed and anesthesia consent givenArterial  Skin Prep: chlorhexidine gluconate  Local Infiltration: lidocaine (1%)  Orientation: left  Location: radial    Catheter Size: 20 G  Catheter placement by Ultrasound guidance. Heme positive aspiration all ports.   Vessel Caliber: medium, patent, compressibility normal  Vascular Doppler:  not done  Needle advanced into vessel with real time Ultrasound guidance.  Sterile sheath used.Insertion Attempts: 1  Assessment  Dressing: secured with tape and tegaderm, sutured in place and taped and steri-strips  Patient: Tolerated well

## 2024-03-19 NOTE — ANESTHESIA PROCEDURE NOTES
Intubation    Date/Time: 3/19/2024 3:38 PM    Performed by: Erasto Garrison CRNA  Authorized by: Richardson Hickman MD    Intubation:     Induction:  Intravenous    Intubated:  Postinduction    Mask Ventilation:  Easy mask    Attempts:  1    Attempted By:  CRNA    Method of Intubation:  Video laryngoscopy    Blade:  Crocker 3    Laryngeal View Grade: Grade I - full view of cords      Difficult Airway Encountered?: No      Complications:  None    Airway Device:  Oral endotracheal tube    Airway Device Size:  7.5    Style/Cuff Inflation:  Cuffed (inflated to minimal occlusive pressure)    Tube secured:  23    Secured at:  The lips    Placement Verified By:  Capnometry    Complicating Factors:  None    Findings Post-Intubation:  BS equal bilateral and atraumatic/condition of teeth unchanged

## 2024-03-20 VITALS
BODY MASS INDEX: 45.03 KG/M2 | RESPIRATION RATE: 18 BRPM | HEIGHT: 69 IN | WEIGHT: 304 LBS | DIASTOLIC BLOOD PRESSURE: 67 MMHG | OXYGEN SATURATION: 100 % | SYSTOLIC BLOOD PRESSURE: 155 MMHG | TEMPERATURE: 99 F | HEART RATE: 84 BPM

## 2024-03-20 PROBLEM — Z98.1 STATUS POST CERVICAL SPINAL FUSION: Status: ACTIVE | Noted: 2024-03-20

## 2024-03-20 LAB
ALBUMIN SERPL BCP-MCNC: 3.7 G/DL (ref 3.5–5.2)
ALP SERPL-CCNC: 67 U/L (ref 55–135)
ALT SERPL W/O P-5'-P-CCNC: 24 U/L (ref 10–44)
ANION GAP SERPL CALC-SCNC: 7 MMOL/L (ref 8–16)
AST SERPL-CCNC: 18 U/L (ref 10–40)
BASOPHILS # BLD AUTO: 0 K/UL (ref 0–0.2)
BASOPHILS NFR BLD: 0 % (ref 0–1.9)
BILIRUB SERPL-MCNC: 0.4 MG/DL (ref 0.1–1)
BUN SERPL-MCNC: 23 MG/DL (ref 6–20)
CALCIUM SERPL-MCNC: 9 MG/DL (ref 8.7–10.5)
CHLORIDE SERPL-SCNC: 102 MMOL/L (ref 95–110)
CO2 SERPL-SCNC: 23 MMOL/L (ref 23–29)
CREAT SERPL-MCNC: 1.4 MG/DL (ref 0.5–1.4)
DIFFERENTIAL METHOD BLD: ABNORMAL
EOSINOPHIL # BLD AUTO: 0 K/UL (ref 0–0.5)
EOSINOPHIL NFR BLD: 0 % (ref 0–8)
ERYTHROCYTE [DISTWIDTH] IN BLOOD BY AUTOMATED COUNT: 12.2 % (ref 11.5–14.5)
EST. GFR  (NO RACE VARIABLE): 59.4 ML/MIN/1.73 M^2
GLUCOSE SERPL-MCNC: 160 MG/DL (ref 70–110)
GLUCOSE SERPL-MCNC: 189 MG/DL (ref 70–110)
GLUCOSE SERPL-MCNC: 194 MG/DL (ref 70–110)
HCT VFR BLD AUTO: 31.5 % (ref 40–54)
HGB BLD-MCNC: 9.9 G/DL (ref 14–18)
IMM GRANULOCYTES # BLD AUTO: 0.04 K/UL (ref 0–0.04)
IMM GRANULOCYTES NFR BLD AUTO: 0.6 % (ref 0–0.5)
LYMPHOCYTES # BLD AUTO: 0.8 K/UL (ref 1–4.8)
LYMPHOCYTES NFR BLD: 11.5 % (ref 18–48)
MAGNESIUM SERPL-MCNC: 1.5 MG/DL (ref 1.6–2.6)
MCH RBC QN AUTO: 25.7 PG (ref 27–31)
MCHC RBC AUTO-ENTMCNC: 31.4 G/DL (ref 32–36)
MCV RBC AUTO: 82 FL (ref 82–98)
MONOCYTES # BLD AUTO: 0.1 K/UL (ref 0.3–1)
MONOCYTES NFR BLD: 1.2 % (ref 4–15)
NEUTROPHILS # BLD AUTO: 5.8 K/UL (ref 1.8–7.7)
NEUTROPHILS NFR BLD: 86.7 % (ref 38–73)
NRBC BLD-RTO: 0 /100 WBC
PLATELET # BLD AUTO: 262 K/UL (ref 150–450)
PMV BLD AUTO: 9.5 FL (ref 9.2–12.9)
POTASSIUM SERPL-SCNC: 5.1 MMOL/L (ref 3.5–5.1)
PROT SERPL-MCNC: 6.8 G/DL (ref 6–8.4)
RBC # BLD AUTO: 3.85 M/UL (ref 4.6–6.2)
SODIUM SERPL-SCNC: 132 MMOL/L (ref 136–145)
WBC # BLD AUTO: 6.7 K/UL (ref 3.9–12.7)

## 2024-03-20 PROCEDURE — 99232 SBSQ HOSP IP/OBS MODERATE 35: CPT | Mod: ,,, | Performed by: HEALTH CARE PROVIDER

## 2024-03-20 PROCEDURE — 85025 COMPLETE CBC W/AUTO DIFF WBC: CPT | Performed by: NURSE PRACTITIONER

## 2024-03-20 PROCEDURE — 97161 PT EVAL LOW COMPLEX 20 MIN: CPT

## 2024-03-20 PROCEDURE — 83735 ASSAY OF MAGNESIUM: CPT | Performed by: NURSE PRACTITIONER

## 2024-03-20 PROCEDURE — 25000003 PHARM REV CODE 250: Performed by: NURSE PRACTITIONER

## 2024-03-20 PROCEDURE — 25000003 PHARM REV CODE 250: Performed by: HEALTH CARE PROVIDER

## 2024-03-20 PROCEDURE — 25000003 PHARM REV CODE 250: Performed by: INTERNAL MEDICINE

## 2024-03-20 PROCEDURE — 92610 EVALUATE SWALLOWING FUNCTION: CPT

## 2024-03-20 PROCEDURE — 82962 GLUCOSE BLOOD TEST: CPT

## 2024-03-20 PROCEDURE — 63600175 PHARM REV CODE 636 W HCPCS: Performed by: HEALTH CARE PROVIDER

## 2024-03-20 PROCEDURE — 80053 COMPREHEN METABOLIC PANEL: CPT | Performed by: NURSE PRACTITIONER

## 2024-03-20 RX ORDER — PREGABALIN 75 MG/1
75 CAPSULE ORAL 2 TIMES DAILY
Qty: 60 CAPSULE | Refills: 0 | Status: SHIPPED | OUTPATIENT
Start: 2024-03-20 | End: 2024-04-24 | Stop reason: SDUPTHER

## 2024-03-20 RX ORDER — LANOLIN ALCOHOL/MO/W.PET/CERES
800 CREAM (GRAM) TOPICAL
Status: DISCONTINUED | OUTPATIENT
Start: 2024-03-20 | End: 2024-03-20 | Stop reason: HOSPADM

## 2024-03-20 RX ORDER — DEXAMETHASONE 2 MG/1
TABLET ORAL
Qty: 42 TABLET | Refills: 0 | Status: SHIPPED | OUTPATIENT
Start: 2024-03-20 | End: 2024-03-29

## 2024-03-20 RX ORDER — PREGABALIN 75 MG/1
75 CAPSULE ORAL 2 TIMES DAILY
Status: DISCONTINUED | OUTPATIENT
Start: 2024-03-20 | End: 2024-03-20 | Stop reason: HOSPADM

## 2024-03-20 RX ORDER — TIZANIDINE 4 MG/1
4 TABLET ORAL EVERY 8 HOURS
Status: DISCONTINUED | OUTPATIENT
Start: 2024-03-20 | End: 2024-03-20 | Stop reason: HOSPADM

## 2024-03-20 RX ADMIN — Medication 800 MG: at 08:03

## 2024-03-20 RX ADMIN — TIZANIDINE 4 MG: 4 TABLET ORAL at 01:03

## 2024-03-20 RX ADMIN — MUPIROCIN 1 G: 20 OINTMENT TOPICAL at 08:03

## 2024-03-20 RX ADMIN — SENNOSIDES AND DOCUSATE SODIUM 1 TABLET: 8.6; 5 TABLET ORAL at 08:03

## 2024-03-20 RX ADMIN — OXYCODONE HYDROCHLORIDE AND ACETAMINOPHEN 1 TABLET: 7.5; 325 TABLET ORAL at 11:03

## 2024-03-20 RX ADMIN — DEXAMETHASONE SODIUM PHOSPHATE 2 MG: 4 INJECTION, SOLUTION INTRA-ARTICULAR; INTRALESIONAL; INTRAMUSCULAR; INTRAVENOUS; SOFT TISSUE at 05:03

## 2024-03-20 RX ADMIN — DEXAMETHASONE SODIUM PHOSPHATE 2 MG: 4 INJECTION, SOLUTION INTRA-ARTICULAR; INTRALESIONAL; INTRAMUSCULAR; INTRAVENOUS; SOFT TISSUE at 11:03

## 2024-03-20 RX ADMIN — OXYCODONE HYDROCHLORIDE AND ACETAMINOPHEN 1 TABLET: 7.5; 325 TABLET ORAL at 05:03

## 2024-03-20 NOTE — DISCHARGE SUMMARY
[facility]  Discharge Summary      Patient Name: Obed Alfred  MRN: [id number]  MARIANNE: [id number]  Patient Class: IP- Inpatient  Admission Date: 3/14/2024  Hospital Length of Stay: 4 days  Discharge Date and Time:  03/20/2024 2:17 PM  Attending Physician: Deisy Soria MD   Discharging Provider: Deisy Soria MD  Primary Care Provider: Juany Robertson MD    Primary Care Team: Networked reference to record PCT     HPI:   53 year old male with a past medical history of HTN, HLD, DM, anemia presents to the emergency room with reports of numbness and weakness to both upper extremities and hands that began 4 days ago. Reports started in both hands than radiated to elbows. 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 Reports resting makes it better and using arms makes it worse. Denies fever, chills, recent travel, URI symptoms, GI symptoms, rash, dysuria, abdominal pain, headache, recent trauma. Started new blood pressure of chlorthalidone a week ago.   In ER, CBC and CMP unremarkable. Troponin negative x2. B12 TSH within normal limits. EKG with 1st degree AV block, no ST elevation, CT c spine: no acute fracture, degenerative changes in cervical spine,  MRI c spine: C3-C4, there is a broad-based disc protrusion asymmetric to the right, with subligamentous extruded component. This results in severe spinal canal stenosis asymmetric to the right, with cervical cord compression. There is increased non fluid-like T2 signal in the cord suggesting edema and or myelopathy. There is severe bilateral foraminal narrowing. C4-C5, there is a broad-based central disc protrusion, which contacts the ventral cervical cord and produces moderate spinal canal stenosis. There is mild-to-moderate bilateral foraminal narrowing.  C5-C6, there is broad-based disc bulging, indenting the ventral thecal sac and producing mild spinal canal  stenosis.  There is moderate right neural foraminal narrowing.At C6-C7, there is mild broad-based disc bulging and mild spinal canal stenosis, without significant foraminal narrowing. MRI brain negative for acute ischemic changes. Admit to hospital medicine with consults to Neurology and Neurosurgery. Dilaudid and Tylenol given in ER.     Procedure(s) (LRB):  DISCECTOMY, SPINE, CERVICAL, ANTERIOR APPROACH, WITH FUSION (Right)      Hospital Course:   Patient admitted to Hospital Medicine with numbness and weakness to both upper extremities and hands that began 4 days ago   He was evaluated by neurosurgeon.  Radiology imaging was consistent with C3-4 cord compression with ventral cord de formation at C4-by.  Dr. Clayton from neurosurgery recommended anterior cervical disc fusion surgery to be performed in next 3-5 days.  Patient received supportive care with gabapentin, intravenous narcotics and Robaxin.  A [place] J collar was placed. Later Neuro surgery seen and decided for fusion surgery on Tuesday and continue to hold all blood thinners  Patient underwent anterior fusion and admitted to step down unit. Pain is controlled. He is ambulating, diet modified. Patient continue to have upper extremity numbness and weakness on discharge. Cleared to be discharged with ALEXANDRU drain on per neurosurgery, follow in the office.      Goals of Care Treatment Preferences:  Code Status: Full Code    Physical Exam  Constitutional:       General: He is not in acute distress.     Appearance: He is not ill-appearing or toxic-appearing.   HENT:      Right Ear: External ear normal.      Left Ear: External ear normal.      Nose: No congestion or rhinorrhea.   Eyes:      Conjunctiva/sclera: Conjunctivae normal.   Cardiovascular:      Heart sounds: No murmur heard.  Pulmonary:      Effort: No respiratory distress.      Breath sounds: No stridor. No wheezing, rhonchi or rales.   Chest:      Chest wall: No tenderness.   Abdominal:      General: There  is no distension.      Palpations: There is no mass.      Tenderness: There is no abdominal tenderness. There is no guarding or rebound.      Hernia: No hernia is present.   Musculoskeletal:         General: Tenderness present. No swelling, deformity or signs of injury.      Right lower leg: No edema.      Left lower leg: No edema.   Skin:     Coloration: Skin is not jaundiced or pale.      Findings: No bruising, erythema, lesion or rash.   Neurological:      Mental Status: He is alert and oriented to person, place, and time. Mental status is at baseline.     Consults:   Consults (From admission, onward)          Status Ordering Provider     Inpatient consult to Hospitalist  Once        Provider:  Deisy Soria MD    Acknowledged CECILE SALINAS     Inpatient consult to neurology  Once        Provider:  Radha Gant MD    Completed NAY HU     Inpatient consult to Neurosurgery  Once        Provider:  Taz Clayton DO    Completed NAY HU            Neuro  Status post cervical spinal fusion        Cervical spinal stenosis  See above      Spinal cord compression  See above      Paresthesia and pain of both upper extremities  S/p anterior fusion by neurosurgery   Continue decadron taper on discharge   Pain control and laxatives       Orthopedic  * Weakness of both upper extremities  Ataxia present  MRI brain negative for stroke, MRI C-spine Broad-based disc protrusion with subligamentous extruded component at C3-C4, producing severe spinal canal stenosis and cervical cord compression, with findings of cord edema and or myelopathy   Neurology and Neurosurgery consulted.   S/p anterior fusion 3/19  Continue gabapentin and Robaxin use with fall precautions.  C collar   PT/OT  Decadron taper   Follow with neurosurgery outpatient           Final Active Diagnoses:    Diagnosis Date Noted POA    PRINCIPAL PROBLEM:  Weakness of both upper extremities [R29.898] 03/14/2024 Yes    Status post  cervical spinal fusion [Z98.1] 03/20/2024 Not Applicable    Paresthesia and pain of both upper extremities [R20.2, M79.601, M79.602] 03/14/2024 Yes    Spinal cord compression [G95.20] 03/14/2024 Yes    Cervical spinal stenosis [M48.02] 03/14/2024 Yes    Essential hypertension [I10] 03/14/2024 Yes    DM (diabetes mellitus) [E11.9] 03/14/2024 Yes    Body mass index (BMI) of 33.0-33.9 in adult [Z68.33] 03/14/2024 Not Applicable      Problems Resolved During this Admission:       Discharged Condition: good    Disposition: Home or Self Care    Follow Up:   Follow-up Information       Taz Clayton, DO Follow up in 1 week(s).    Specialty: Neurosurgery  Contact information:  [address] [place] [address] DR JO-ANN Arcos  [place] [address] [phone]                           Patient Instructions:   No discharge procedures on file.    Significant Diagnostic Studies: Labs: CMP   Recent Labs   Lab 03/19/24  0648 03/20/24  0404   * 132*   K 4.0 5.1    102   CO2 25 23   * 194*   BUN 27* 23*   CREATININE 1.3 1.4   CALCIUM 9.0 9.0   PROT 6.5 6.8   ALBUMIN 3.6 3.7   BILITOT 0.4 0.4   ALKPHOS 63 67   AST 16 18   ALT 22 24   ANIONGAP 6* 7*    and CBC   Recent Labs   Lab 03/19/24  0648 03/20/24  0404   WBC 6.66 6.70   HGB 9.9* 9.9*   HCT 32.8* 31.5*    262       Pending Diagnostic Studies:       None           Medications:  Reconciled Home Medications:      Medication List        START taking these medications      dexAMETHasone 2 MG tablet  Commonly known as: DECADRON  Take 2 tablets (4 mg total) by mouth every 6 (six) hours for 3 days, THEN 2 tablets (4 mg total) every 12 (twelve) hours for 3 days, THEN 1 tablet (2 mg total) every 12 (twelve) hours for 3 days.  Start taking on: March 20, 2024     oxyCODONE-acetaminophen 7.5-325 mg per tablet  Commonly known as: PERCOCET  Take 1 tablet by mouth every 6 (six) hours as needed for Pain.     senna 8.6 mg tablet  Commonly known as: SENOKOT  Take 1 tablet by mouth once  daily. for 14 days     tiZANidine 4 MG tablet  Commonly known as: ZANAFLEX  Take 1 tablet (4 mg total) by mouth every 8 (eight) hours. for 21 days            CONTINUE taking these medications      chlorthalidone 25 MG Tab  Commonly known as: HYGROTEN  Take 1 tablet by mouth once daily.     valsartan 320 MG tablet  Commonly known as: DIOVAN  Take 1 tablet by mouth once daily.            STOP taking these medications      amLODIPine 10 MG tablet  Commonly known as: NORVASC     atorvastatin 40 MG tablet  Commonly known as: LIPITOR              Indwelling Lines/Drains at time of discharge:   Lines/Drains/Airways       Drain  Duration                  Closed/Suction Drain 03/19/24 Tube - 1 Anterior Neck Bulb 1 day                    Time spent on the discharge of patient: 40 minutes         Deisy Soria MD  Department of Hospital Medicine  [facility]

## 2024-03-20 NOTE — ASSESSMENT & PLAN NOTE
Ataxia present  MRI brain negative for stroke, MRI C-spine Broad-based disc protrusion with subligamentous extruded component at C3-C4, producing severe spinal canal stenosis and cervical cord compression, with findings of cord edema and or myelopathy   Neurology and Neurosurgery consulted.   S/p anterior fusion 3/19  Continue gabapentin and Robaxin use with fall precautions.  C collar   PT/OT  Decadron taper   Follow with neurosurgery outpatient

## 2024-03-20 NOTE — PT/OT/SLP EVAL
Speech Language Pathology Evaluation  Bedside Swallow    Patient Name:  Obed Alfred   MRN:  [id number]  Admitting Diagnosis: Weakness of both upper extremities    Recommendations:                 General Recommendations:  Follow-up not indicated  Diet recommendations:  Regular Diet - IDDSI Level 7, Thin   Aspiration Precautions: Standard aspiration precautions   General Precautions: Standard,    Communication strategies:  none    Assessment:     Obed Alfred is a 53 y.o. male with an SLP diagnosis of  no apparent oropharyngeal dysphagia .  He displayed no s/s oropharyngeal dysphagia on regular textures & liquids, and voiced no complaints re eating or swallowing.  No tx. .    History:     History reviewed. No pertinent past medical history.    Past Surgical History:   Procedure Laterality Date    ANTERIOR CERVICAL DISCECTOMY W/ FUSION Right 3/19/2024    Procedure: DISCECTOMY, SPINE, CERVICAL, ANTERIOR APPROACH, WITH FUSION;  Surgeon: Taz Clayton DO;  Location: [facility];  Service: Neurosurgery;  Laterality: Right;  acdf C3-5       Social History: Patient lives with spouse.    Prior Intubation HX:  surgery for ACDF yesterday    Modified Barium Swallow: none in Epic    Chest X-Rays: no acute process    Prior diet: regular textures & liquids.    Subjective     They can't find any of that Chloroseptic in the hospital.  Its okay, tho, because I don't hurt.  Patient goals: home today     Objective:     Oral Musculature Evaluation  Oral Musculature: WNL  Dentition: present and adequate  Secretion Management: adequate  Mucosal Quality: good  Mandibular Strength and Mobility: WNL  Oral Labial Strength and Mobility: WNL  Lingual Strength and Mobility: WNL  Velar Elevation: WNL  Buccal Strength and Mobility: WNL  Volitional Cough: strong  Voice Prior to PO Intake: clear, no dysarthria    Bedside Swallow Eval:   Consistencies Assessed:  Thin liquids via cup and straw sips, and 4 oz water challenge  Puree applesauce  Mixed  consistencies peaches in thin juice  Solids maria alejandra cracker      Oral Phase:   WNL    Pharyngeal Phase:   no overt clinical signs/symptoms of aspiration  no overt clinical signs/symptoms of pharyngeal dysphagia    Compensatory Strategies  None    Treatment: Education to pt and spouse re  impressions & recommendations.      Goals:   Multidisciplinary Problems       SLP Goals       Not on file                    Plan:     Patient to be seen:      Plan of Care expires:     Plan of Care reviewed with:  patient, spouse   SLP Follow-Up:  No       Discharge recommendations:      Barriers to Discharge:  None    Time Tracking:     SLP Treatment Date:   03/20/24  Speech Start Time:  1044  Speech Stop Time:  1100     Speech Total Time (min):  16 min    Billable Minutes: Eval Swallow and Oral Function 16    03/20/2024

## 2024-03-20 NOTE — NURSING
PT going in and out of 2 degree type 1 rhythm.  Notified Dr. Diaz.  See MAR for discontinued medications.  Will continue to monitor.

## 2024-03-20 NOTE — PROGRESS NOTES
[facility]  Neurosurgery  Progress Note    Subjective:     History of Present Illness: Mr. Obed Alfred is a 53 year old male with PMHx of HTN, Type 2 diabetes presented to [facility] ED due to bilateral hand paraesthesias and subjective upper extremity weakness. Subjective left arm weakness greater than right. Patient reported approximately 1 month ago he noticed he was having right sided cervical pain that radiated to his right shoulder, anterior upper arm forearm and fingers with cervical extension. He was also recently fired from his job as a  because he had difficulty lifting plates. He reports imbalance, hand clumsiness, and difficulty with zippers. Denies falls, bladder/bowel dysfunction, cervical LILIAN, or cervical surgery.     MRI cervical obtained today revealed C3-4 disc protrusion resulting in severe spinal canal stenosis.    Neurosurgery consulted for spinal cord stenosis and cervical cord compression.       Post-Op Info:  Procedure(s) (LRB):  DISCECTOMY, SPINE, CERVICAL, ANTERIOR APPROACH, WITH FUSION (Right)   1 Day Post-Op   Interval History: 3/20:  Patient underwent C3-5 ACDF on 03/20/2024.  POD 1.  Overnight, patient did have episodes of second-degree AV block type 1.  Hospitalist discontinued amlodipine, gabapentin, Robaxin and tizanidine.  No other acute events reported.  Afebrile.  Blood pressure 141/72.  Pulse 80.  Respirations 15.  WBC 6.7.  Hemoglobin 9.9.  Sodium 132.  ALEXANDRU drain in place with 23 mL of output in the last 12 hours.  Patient found in bed, awake and alert with family at bedside.  Today, he reports incisional pain in mild pain with swallowing.  Currently using bedside urinal to void.  Denies dysphagia, dysphonia, new upper extremity weakness.    Medications:  Continuous Infusions:  Scheduled Meds:   atorvastatin  40 mg Oral QHS    bisacodyL  10 mg Rectal Daily    dexAMETHasone  2 mg Intravenous Q6H    lactated ringers  1,000 mL Intravenous Once    mupirocin    Nasal BID    oxyCODONE-acetaminophen  1 tablet Oral Q6H    senna-docusate 8.6-50 mg  1 tablet Oral Daily     PRN Meds:acetaminophen, acetaminophen, aluminum-magnesium hydroxide-simethicone, dextrose 50% in water (D50W), dextrose 50% in water (D50W), glucagon (human recombinant), glucose, glucose, HYDROcodone-acetaminophen, HYDROmorphone, HYDROmorphone, insulin aspart U-100, magnesium oxide, magnesium oxide, melatonin, naloxone, ondansetron, oxyCODONE, oxyCODONE, potassium bicarbonate, potassium bicarbonate, potassium bicarbonate, potassium, sodium phosphates, potassium, sodium phosphates, potassium, sodium phosphates, senna-docusate 8.6-50 mg, sodium chloride 0.9%       Objective:     Weight: (!) 137.9 kg (304 lb)  Body mass index is 44.89 kg/m².  Vital Signs (Most Recent):  Temp: 97.2 °F (36.2 °C) (03/20/24 0715)  Pulse: 80 (03/20/24 0600)  Resp: 15 (03/20/24 0600)  BP: (!) 141/72 (03/20/24 0600)  SpO2: 99 % (03/20/24 0600) Vital Signs (24h Range):  Temp:  [96.5 °F (35.8 °C)-97.8 °F (36.6 °C)] 97.2 °F (36.2 °C)  Pulse:  [58-85] 80  Resp:  [10-29] 15  SpO2:  [98 %-100 %] 99 %  BP: (111-153)/(61-84) 141/72  Arterial Line BP: (127-185)/(55-77) 151/69                              Closed/Suction Drain 03/19/24 Tube - 1 Anterior Neck Bulb (Active)   Dressing Status Clean;Dry;Intact 03/19/24 2101   Dressing Intervention First dressing 03/19/24 2101   Status To bulb suction 03/19/24 2101   Output (mL) 5 mL 03/20/24 0600         Neurosurgery Physical Exam  General: well developed, well nourished, no distress.   Head: normocephalic, atraumatic  Neck: No tracheal deviation.  Collar in place.    Neurologic: Alert and oriented. Thought content appropriate.  GCS: E4 V5 M6; Total: 15  Pulmonary: normal respirations, no signs of respiratory distress  Skin: Skin is warm, dry and intact.    Motor Strength: Moves all extremities spontaneously with good tone.  No abnormal movements seen.  Bilateral upper extremity strength 5/5 in all  "muscle groups.    Bilateral lower extremity strength 5/5 in all muscle groups.      Anterior cervical incision: Incision covered with clean, dry steri strip. No surrounding erythema or edema. No drainage from incision. No palpable hematoma or underlying fluid collection.  ALEXANDRU drain x 1 in place     Significant Labs:  Recent Labs   Lab 03/19/24  0648 03/20/24  0404   * 194*   * 132*   K 4.0 5.1    102   CO2 25 23   BUN 27* 23*   CREATININE 1.3 1.4   CALCIUM 9.0 9.0   MG 1.7 1.5*     Recent Labs   Lab 03/19/24  0648 03/20/24  0404   WBC 6.66 6.70   HGB 9.9* 9.9*   HCT 32.8* 31.5*    262     No results for input(s): "LABPT", "INR", "APTT" in the last 48 hours.  Microbiology Results (last 7 days)       ** No results found for the last 168 hours. **            Assessment/Plan:     Status post cervical spinal fusion  Mr. Obed Alfred is a 54 y/o male who underwent C3-5 ACDF on 03/20/2024.      POD 1.     Neurologically intact.      --Patient admitted to Neurosurgery on telemetry      -q4h neurochecks on floor  --All labs and diagnostics reviewed  --Follow-up post-op cervical Xrs in appropriate alignment with hardware malfunction.   --Discontinue arterial line.  --Maintain collar at all times   --ALEXANDRU drains to remain at this time on  suction; please record hourly outputs  --PT/OT/OOB, Please place patient in chair and allow him to use the restroom.  --Oxygen saturation records range from % on room air.  Please  discontinue nasal cannula oxygen  --Restart tizanidine 4 mg every 8 hours.  --ADAT  --Pain control with multimodal pain regimen   --TEDs/SCDs  --Continue to monitor clinically, notify NSGY immediately with any changes in neuro status    Dispo:  Pending physical therapy evaluation, anticipate discharged home this afternoon.  Patient will discharged with drain in place.  Please advise him on how to empty and document drain output.  Please also provide him with a UA cup to measure output. "  Also, postop discharge medications should be delivered to patient's bedside by pharmacy.          CECILE SALINAS PA-C  Neurosurgery  [facility]

## 2024-03-20 NOTE — NURSING
Discharge instructions and medications were reviewed with patient and spouse. All questions, comments, and concerns were addressed. Mr. Alfred left and right IVs were DC. Patient tolerated well. Vitals were within limits. Patient transported by wheelchair to [facility] main entrance. Patient transferred to spouse's care and patient left by car.

## 2024-03-20 NOTE — NURSING
Nurses Note -- 4 Eyes      3/19/2024   2100 PM      Skin assessed during: Transfer      [] No Altered Skin Integrity Present    []Prevention Measures Documented      [x] Yes- Altered Skin Integrity Present or Discovered      SURGICAL INCISION   [] LDA Added if Not in Epic (Describe Wound)   [x] New Altered Skin Integrity was Present on Admit and Documented in LDA   [] Wound Image Taken    Wound Care Consulted? No    Attending Nurse:  Kamryn Santos RN/Staff Member:   Alix Perez RN

## 2024-03-20 NOTE — NURSING
A-line removal tolerated well. Removed from left wrist. Pressure held for 10 minutes. No hematoma or swelling noted. Hemostasis achieved. Site covered with gauze and transparent dressing. Pt instructed not lift over 5 pounds and to keep wrist still until further instructed. Questions and concerns answered and pt instructed to call for assistance. Patient moved to chair for breakfast. Call light in reach and spouse at bedside. Will continue to monitor.

## 2024-03-20 NOTE — PLAN OF CARE
Problem: Adult Inpatient Plan of Care  Goal: Plan of Care Review  Outcome: Met  Goal: Patient-Specific Goal (Individualized)  Outcome: Met  Goal: Absence of Hospital-Acquired Illness or Injury  Outcome: Met  Goal: Optimal Comfort and Wellbeing  Outcome: Met  Goal: Readiness for Transition of Care  Outcome: Met     Problem: Diabetes Comorbidity  Goal: Blood Glucose Level Within Targeted Range  Outcome: Met     Problem: Fall Injury Risk  Goal: Absence of Fall and Fall-Related Injury  Outcome: Met     Problem: Bariatric Environmental Safety  Goal: Safety Maintained with Care  Outcome: Met     Problem: Infection  Goal: Absence of Infection Signs and Symptoms  Outcome: Met

## 2024-03-20 NOTE — ASSESSMENT & PLAN NOTE
Mr. Obed Alfred is a 52 y/o male who underwent C3-5 ACDF on 03/20/2024.      POD 1.     Neurologically intact.      --Patient admitted to Neurosurgery on telemetry      -q4h neurochecks on floor  --All labs and diagnostics reviewed  --Follow-up post-op cervical Xrs in appropriate alignment with hardware malfunction.   --Discontinue arterial line.  --Maintain collar at all times   --ALEXANDRU drains to remain at this time on  suction; please record hourly outputs  --PT/OT/OOB, Please place patient in chair and allow him to use the restroom.  --Oxygen saturation records range from % on room air.  Please  discontinue nasal cannula oxygen  --Restart tizanidine 4 mg every 8 hours.  --ADAT  --Pain control with multimodal pain regimen   --TEDs/SCDs  --Continue to monitor clinically, notify NSGY immediately with any changes in neuro status    Dispo:  Pending physical therapy evaluation, anticipate discharged home this afternoon.  Patient will discharged with drain in place.  Please advise him on how to empty and document drain output.  Please also provide him with a UA cup to measure output.  Also, postop discharge medications should be delivered to patient's bedside by pharmacy.

## 2024-03-20 NOTE — PLAN OF CARE
Pt is clear for discharge with no needs.         03/20/24 1523   Final Note   Anticipated Discharge Disposition Home

## 2024-03-20 NOTE — SUBJECTIVE & OBJECTIVE
Interval History: 3/20:  Patient underwent C3-5 ACDF on 03/20/2024.  POD 1.  Overnight, patient did have episodes of second-degree AV block type 1.  Hospitalist discontinued amlodipine, gabapentin, Robaxin and tizanidine.  No other acute events reported.  Afebrile.  Blood pressure 141/72.  Pulse 80.  Respirations 15.  WBC 6.7.  Hemoglobin 9.9.  Sodium 132.  ALEXANDRU drain in place with 23 mL of output in the last 12 hours.  Patient found in bed, awake and alert with family at bedside.  Today, he reports incisional pain in mild pain with swallowing.  Currently using bedside urinal to void.  Denies dysphagia, dysphonia, new upper extremity weakness.    Medications:  Continuous Infusions:  Scheduled Meds:   atorvastatin  40 mg Oral QHS    bisacodyL  10 mg Rectal Daily    dexAMETHasone  2 mg Intravenous Q6H    lactated ringers  1,000 mL Intravenous Once    mupirocin   Nasal BID    oxyCODONE-acetaminophen  1 tablet Oral Q6H    senna-docusate 8.6-50 mg  1 tablet Oral Daily     PRN Meds:acetaminophen, acetaminophen, aluminum-magnesium hydroxide-simethicone, dextrose 50% in water (D50W), dextrose 50% in water (D50W), glucagon (human recombinant), glucose, glucose, HYDROcodone-acetaminophen, HYDROmorphone, HYDROmorphone, insulin aspart U-100, magnesium oxide, magnesium oxide, melatonin, naloxone, ondansetron, oxyCODONE, oxyCODONE, potassium bicarbonate, potassium bicarbonate, potassium bicarbonate, potassium, sodium phosphates, potassium, sodium phosphates, potassium, sodium phosphates, senna-docusate 8.6-50 mg, sodium chloride 0.9%       Objective:     Weight: (!) 137.9 kg (304 lb)  Body mass index is 44.89 kg/m².  Vital Signs (Most Recent):  Temp: 97.2 °F (36.2 °C) (03/20/24 0715)  Pulse: 80 (03/20/24 0600)  Resp: 15 (03/20/24 0600)  BP: (!) 141/72 (03/20/24 0600)  SpO2: 99 % (03/20/24 0600) Vital Signs (24h Range):  Temp:  [96.5 °F (35.8 °C)-97.8 °F (36.6 °C)] 97.2 °F (36.2 °C)  Pulse:  [58-85] 80  Resp:  [10-29] 15  SpO2:  [98  "%-100 %] 99 %  BP: (111-153)/(61-84) 141/72  Arterial Line BP: (127-185)/(55-77) 151/69                              Closed/Suction Drain 03/19/24 Tube - 1 Anterior Neck Bulb (Active)   Dressing Status Clean;Dry;Intact 03/19/24 2101   Dressing Intervention First dressing 03/19/24 2101   Status To bulb suction 03/19/24 2101   Output (mL) 5 mL 03/20/24 0600         Neurosurgery Physical Exam  General: well developed, well nourished, no distress.   Head: normocephalic, atraumatic  Neck: No tracheal deviation.  Collar in place.    Neurologic: Alert and oriented. Thought content appropriate.  GCS: E4 V5 M6; Total: 15  Pulmonary: normal respirations, no signs of respiratory distress  Skin: Skin is warm, dry and intact.    Motor Strength: Moves all extremities spontaneously with good tone.  No abnormal movements seen.  Bilateral upper extremity strength 5/5 in all muscle groups.    Bilateral lower extremity strength 5/5 in all muscle groups.      Anterior cervical incision: Incision covered with clean, dry steri strip. No surrounding erythema or edema. No drainage from incision. No palpable hematoma or underlying fluid collection.  ALEXANDRU drain x 1 in place     Significant Labs:  Recent Labs   Lab 03/19/24  0648 03/20/24  0404   * 194*   * 132*   K 4.0 5.1    102   CO2 25 23   BUN 27* 23*   CREATININE 1.3 1.4   CALCIUM 9.0 9.0   MG 1.7 1.5*     Recent Labs   Lab 03/19/24  0648 03/20/24  0404   WBC 6.66 6.70   HGB 9.9* 9.9*   HCT 32.8* 31.5*    262     No results for input(s): "LABPT", "INR", "APTT" in the last 48 hours.  Microbiology Results (last 7 days)       ** No results found for the last 168 hours. **            "

## 2024-03-20 NOTE — PT/OT/SLP EVAL
Physical Therapy Evaluation and Discharge Note    Patient Name:  Obed Alfred   MRN:  [id number]    Recommendations:     Discharge Recommendations: Low Intensity Therapy  Discharge Equipment Recommendations: none   Barriers to discharge:  medical status    Assessment:     Obed Alfred is a 53 y.o. male admitted with a medical diagnosis of Weakness of both upper extremities. .  At this time, patient is functioning at their prior level of function and does not require further acute PT services.     Recent Surgery: Procedure(s) (LRB):  DISCECTOMY, SPINE, CERVICAL, ANTERIOR APPROACH, WITH FUSION (Right) 1 Day Post-Op    Plan:     During this hospitalization, patient does not require further acute PT services.  Please re-consult if situation changes.      Subjective     Chief Complaint: UE deficits  Patient/Family Comments/goals: get better  Pain/Comfort:  Pain Rating 1: 0/10    Patients cultural, spiritual, Methodist conflicts given the current situation: no    Living Environment:  Pt lives with his wife in a one story home (+)   Prior to admission, patients level of function was Independent.  Equipment used at home: none.  DME owned (not currently used): none.  Upon discharge, patient will have assistance from wife.    Objective:     Communicated with RN prior to session.  Patient found up in chair with peripheral IV, telemetry upon PT entry to room.    General Precautions: Standard, other (see comments) (none)    Orthopedic Precautions:spinal precautions   Braces: Webster J collar  Respiratory Status: Room air    Exams:  Cognitive Exam:  Patient is oriented to Person, Place, Time, and Situation  RLE ROM: WFL  RLE Strength: WFL  LLE ROM: WFL  LLE Strength: WFL    Functional Mobility:  Transfers:     Sit to Stand:  independence with no AD  Gait: 200 ft with no AD and supervision    AM-PAC 6 CLICK MOBILITY  Total Score:22       Treatment and Education:  Pt educated on POC, discharge recommendation, importance of  time OOB, pacing/energy conservation, use of call bell to seek assistance as needed and fall prevention      AM-PAC 6 CLICK MOBILITY  Total Score:22     Patient left up in chair with all lines intact, call button in reach, and wife present.    GOALS:   Multidisciplinary Problems       Physical Therapy Goals       Not on file                    History:     History reviewed. No pertinent past medical history.    Past Surgical History:   Procedure Laterality Date    ANTERIOR CERVICAL DISCECTOMY W/ FUSION Right 3/19/2024    Procedure: DISCECTOMY, SPINE, CERVICAL, ANTERIOR APPROACH, WITH FUSION;  Surgeon: Taz Clayton DO;  Location: [facility];  Service: Neurosurgery;  Laterality: Right;  acdf C3-5       Time Tracking:     PT Received On: 03/20/24  PT Start Time: 0941     PT Stop Time: 0948  PT Total Time (min): 7 min     Billable Minutes: Evaluation 7      03/20/2024

## 2024-03-20 NOTE — ASSESSMENT & PLAN NOTE
S/p anterior fusion by neurosurgery   Continue decadron taper on discharge   Pain control and laxatives

## 2024-03-20 NOTE — TRANSFER OF CARE
"Anesthesia Transfer of Care Note    Patient: Obed Alfred    Procedure(s) Performed: Procedure(s) (LRB):  DISCECTOMY, SPINE, CERVICAL, ANTERIOR APPROACH, WITH FUSION (Right)    Patient location: PACU    Anesthesia Type: general    Transport from OR: Transported from OR on 2-3 L/min O2 by NC with adequate spontaneous ventilation    Post pain: adequate analgesia    Post assessment: no apparent anesthetic complications    Post vital signs: stable    Level of consciousness: awake and alert    Nausea/Vomiting: no nausea/vomiting    Complications: none    Transfer of care protocol was followed      Last vitals: Visit Vitals  BP (!) 146/79   Pulse 65   Temp 36.6 °C (97.8 °F) (Oral)   Resp 18   Ht 5' 9" (1.753 m)   Wt (!) 137.9 kg (304 lb)   SpO2 98%   BMI 44.89 kg/m²     "

## 2024-03-20 NOTE — ANESTHESIA POSTPROCEDURE EVALUATION
Anesthesia Post Evaluation    Patient: Obed Alfred    Procedure(s) Performed: Procedure(s) (LRB):  DISCECTOMY, SPINE, CERVICAL, ANTERIOR APPROACH, WITH FUSION (Right)    Final Anesthesia Type: general      Patient location during evaluation: PACU  Patient participation: Yes- Able to Participate  Level of consciousness: awake and alert and oriented  Post-procedure vital signs: reviewed and stable  Pain management: adequate  Airway patency: patent    PONV status at discharge: No PONV  Anesthetic complications: no      Cardiovascular status: blood pressure returned to baseline and hemodynamically stable  Respiratory status: unassisted, spontaneous ventilation and nasal cannula  Hydration status: euvolemic  Follow-up not needed.          VSS pt resting comfortably in bed. Release to step down with O2 nasal cannula    Vitals Value Taken Time   /74 03/19/24 2030   Temp 35.8 °C (96.5 °F) 03/19/24 2000   Pulse 63 03/19/24 2040   Resp 14 03/19/24 2040   SpO2 100 % 03/19/24 2040   Vitals shown include unvalidated device data.      No case tracking events are documented in the log.      Pain/Lester Score: Pain Rating Prior to Med Admin: 10 (3/19/2024  8:40 PM)  Lester Score: 9 (3/19/2024  8:15 PM)

## 2024-03-21 ENCOUNTER — OFFICE VISIT (OUTPATIENT)
Dept: NEUROSURGERY | Facility: CLINIC | Age: 54
End: 2024-03-21
Payer: MEDICAID

## 2024-03-21 ENCOUNTER — TELEPHONE (OUTPATIENT)
Dept: NEUROSURGERY | Facility: CLINIC | Age: 54
End: 2024-03-21
Payer: MEDICAID

## 2024-03-21 DIAGNOSIS — Z48.03 ENCOUNTER FOR CHANGE OR REMOVAL OF DRAINS: Primary | ICD-10-CM

## 2024-03-21 PROCEDURE — 4010F ACE/ARB THERAPY RXD/TAKEN: CPT | Mod: CPTII,,, | Performed by: HEALTH CARE PROVIDER

## 2024-03-21 PROCEDURE — 3044F HG A1C LEVEL LT 7.0%: CPT | Mod: CPTII,,, | Performed by: HEALTH CARE PROVIDER

## 2024-03-21 PROCEDURE — 99024 POSTOP FOLLOW-UP VISIT: CPT | Mod: S$PBB,,, | Performed by: HEALTH CARE PROVIDER

## 2024-03-21 NOTE — TELEPHONE ENCOUNTER
----- Message from Eloisa Bergman sent at 3/21/2024 11:20 AM CDT -----  Type: Needs Medical Advice  Who Called:  pt wife, Rashadaressa   Symptoms (please be specific):  said she need to speak to the nurse said pt had surgery and was told he would get a call to have his drains removed and the have not received that call yet--please call and advise  Best Call Back Number: [phone]  Additional Information: thank you

## 2024-03-25 ENCOUNTER — TELEPHONE (OUTPATIENT)
Dept: NEUROSURGERY | Facility: CLINIC | Age: 54
End: 2024-03-25
Payer: MEDICAID

## 2024-03-25 DIAGNOSIS — Z98.1 S/P CERVICAL SPINAL FUSION: Primary | ICD-10-CM

## 2024-03-25 NOTE — TELEPHONE ENCOUNTER
----- Message from Hayes Roberson, Patient Care Assistant sent at 3/25/2024 10:17 AM CDT -----  Contact: Pt Wife Marvin  Type: Needs Medical Advice    Who Called: Pt Carol Wong  Best Call Back Number: [phone]  Inquiry/Question: Marvin is calling to get an appt to have the stitches removed. Please advise Thank you~

## 2024-03-25 NOTE — PROGRESS NOTES
Today, patient presented for surgical drain removal. Drain suture was cut and drain was removed. Drain tubing was intact upon removal. Skin opening was covered by 4x4 pressure bandage and tape. Patient was instructed to removed bandage in 24 hours. Patient tolerated procedure well with minimal blood loss.

## 2024-03-31 LAB
OHS QRS DURATION: 128 MS
OHS QTC CALCULATION: 452 MS

## 2024-04-03 ENCOUNTER — OFFICE VISIT (OUTPATIENT)
Dept: NEUROSURGERY | Facility: CLINIC | Age: 54
End: 2024-04-03
Payer: MEDICAID

## 2024-04-03 ENCOUNTER — HOSPITAL ENCOUNTER (OUTPATIENT)
Dept: RADIOLOGY | Facility: HOSPITAL | Age: 54
Discharge: HOME | End: 2024-04-03
Attending: HEALTH CARE PROVIDER
Payer: MEDICAID

## 2024-04-03 VITALS
DIASTOLIC BLOOD PRESSURE: 75 MMHG | WEIGHT: 304 LBS | SYSTOLIC BLOOD PRESSURE: 122 MMHG | BODY MASS INDEX: 45.03 KG/M2 | HEIGHT: 69 IN | HEART RATE: 64 BPM

## 2024-04-03 DIAGNOSIS — Z98.1 S/P CERVICAL SPINAL FUSION: ICD-10-CM

## 2024-04-03 DIAGNOSIS — Z98.1 S/P CERVICAL SPINAL FUSION: Primary | ICD-10-CM

## 2024-04-03 PROCEDURE — 72040 X-RAY EXAM NECK SPINE 2-3 VW: CPT | Mod: TC

## 2024-04-03 PROCEDURE — 3008F BODY MASS INDEX DOCD: CPT | Mod: CPTII,,, | Performed by: HEALTH CARE PROVIDER

## 2024-04-03 PROCEDURE — 99024 POSTOP FOLLOW-UP VISIT: CPT | Mod: S$PBB,,, | Performed by: HEALTH CARE PROVIDER

## 2024-04-03 PROCEDURE — 72040 X-RAY EXAM NECK SPINE 2-3 VW: CPT | Mod: 26,,, | Performed by: RADIOLOGY

## 2024-04-03 PROCEDURE — 1159F MED LIST DOCD IN RCRD: CPT | Mod: CPTII,,, | Performed by: HEALTH CARE PROVIDER

## 2024-04-03 PROCEDURE — 3044F HG A1C LEVEL LT 7.0%: CPT | Mod: CPTII,,, | Performed by: HEALTH CARE PROVIDER

## 2024-04-03 PROCEDURE — 3078F DIAST BP <80 MM HG: CPT | Mod: CPTII,,, | Performed by: HEALTH CARE PROVIDER

## 2024-04-03 PROCEDURE — 4010F ACE/ARB THERAPY RXD/TAKEN: CPT | Mod: CPTII,,, | Performed by: HEALTH CARE PROVIDER

## 2024-04-03 PROCEDURE — 3074F SYST BP LT 130 MM HG: CPT | Mod: CPTII,,, | Performed by: HEALTH CARE PROVIDER

## 2024-04-03 NOTE — PROGRESS NOTES
"  Neurosurgery  Post-Operative Follow up    SUBJECTIVE:     History of Present Illness:  Obed Alfred is a 53 y.o. male  presents status post C3-5 Anterior cervical discectomy and fusion on 03/19/2024 for cervical spinal stenosis, degenerative disc disease and cervical myelopathy.  Preoperative symptoms include posterior cervical pain which radiated to his right upper extremity with cervical extension and bilateral upper extremity weakness.  Today, patient reports he noticed his upper extremity strength has improved.  One of his goals after surgery was to pull start the weedeater which he could not do previously.  On yesterday, he was able to start weedeater without difficulty.  However, he does report he continues to have bilateral hand numbness, difficulty with buttons and holding utensils to feed hisself which was present prior to surgical intervention.  He continues to take Lyrica, muscle relaxer and pain medication.  He also endorses he is able to perform household chores and walks approximately 2 miles a day.  He denies incisional pain, posterior cervical pain, dysphagia, dysphonia, or new extremity weakness.    Family present during encounter.    OBJECTIVE:     Vital Signs  Pulse: 64  BP: 122/75  Pain Score:   1  Height: 5' 9" (175.3 cm)  Weight: (!) 137.9 kg (304 lb 0.2 oz)  Body mass index is 44.9 kg/m².    Neurosurgery Physical Exam  General: well developed, well nourished, no distress.   Head: normocephalic  Neck: No tracheal deviation. No palpable masses.  Cervical collar in place  Neurologic: Alert and oriented. Thought content appropriate.  GCS: E4 V5 M6; Total: 15  Pulmonary: normal respirations, no signs of respiratory distress  Skin: Skin is warm, dry and intact.    Sensory: intact to light touch throughout  Motor Strength: Moves all extremities spontaneously with good tone. No abnormal movements seen.   Strength  Deltoids Triceps Biceps Wrist Extension Wrist Flexion Hand    Upper: R 5/5 5/5 " 5/5 5/5 5/5 5/5    L 5/5 5/5 5/5 5/5 5/5 5/5       Anterior cervical Incision: C/D/I with skin edges well approximated with staples. No surrounding erythema or edema. No drainage from incision. No palpable hematoma or underlying fluid collection.    Diagnostic Imaging:  I have personally reviewed cervical x-ray images obtained 04/03/2024 with patient which revealed stable alignment without hardware malfunction or complication.    ASSESSMENT/PLAN:     1. S/P cervical spinal fusion      Patient continues to improve postoperatively and is neurologically intact.  Advised patient that he is still in the early stages of healing.  I anticipate that his reported symptoms will improve however I advised him that this is not a guarantee.  There is a chance that these symptoms may never resolve but we can not determine that at this stage in healing.  Patient verbalized understanding.    Physical therapy referral placed.    Patient will follow-up in 6 weeks with cervical x-rays.        S/P cervical spinal fusion  -     X-Ray Cervical Spine 2 or 3 Views; Future; Expected date: 05/14/2024  -     Ambulatory referral/consult to Physical/Occupational Therapy; Future; Expected date: 04/10/2024      I spent a total of 36 minutes non-face and face to face time preparing to see the patient (eg, review of tests), obtaining and/or reviewing separately obtained history, documenting clinical information in the electronic or other health record, interpreting results and communicating results to the patient/family/caregiver, or care coordinator.    Jessica Sommer PA-C  Neurosurgery  [facility]

## 2024-04-09 ENCOUNTER — TELEPHONE (OUTPATIENT)
Dept: NEUROSURGERY | Facility: CLINIC | Age: 54
End: 2024-04-09
Payer: MEDICAID

## 2024-04-09 ENCOUNTER — CLINICAL SUPPORT (OUTPATIENT)
Dept: REHABILITATION | Facility: HOSPITAL | Age: 54
End: 2024-04-09
Payer: MEDICAID

## 2024-04-09 ENCOUNTER — PATIENT MESSAGE (OUTPATIENT)
Dept: NEUROSURGERY | Facility: CLINIC | Age: 54
End: 2024-04-09
Payer: MEDICAID

## 2024-04-09 DIAGNOSIS — Z98.1 S/P CERVICAL SPINAL FUSION: ICD-10-CM

## 2024-04-09 DIAGNOSIS — Z74.09 IMPAIRED MOBILITY AND ADLS: Primary | ICD-10-CM

## 2024-04-09 DIAGNOSIS — Z98.1 S/P CERVICAL SPINAL FUSION: Primary | ICD-10-CM

## 2024-04-09 DIAGNOSIS — Z78.9 IMPAIRED MOBILITY AND ADLS: Primary | ICD-10-CM

## 2024-04-09 PROCEDURE — 97161 PT EVAL LOW COMPLEX 20 MIN: CPT | Mod: PN

## 2024-04-09 RX ORDER — OXYCODONE AND ACETAMINOPHEN 5; 325 MG/1; MG/1
1 TABLET ORAL EVERY 8 HOURS PRN
Qty: 21 TABLET | Refills: 0 | Status: SHIPPED | OUTPATIENT
Start: 2024-04-09 | End: 2024-04-16

## 2024-04-09 NOTE — TELEPHONE ENCOUNTER
Attempted to contact pt regarding his request for OT order. Called and left voicemail for pt to call back so that staff can gather more information about request to assist the patient.

## 2024-04-09 NOTE — TELEPHONE ENCOUNTER
Provider approved new pain medication for Percocet 5-325mg which was sent to pt pharmacy. Will follow-up tomorrow in regards to pt OT request.

## 2024-04-09 NOTE — PLAN OF CARE
[facility]  Physical Therapy Initial Evaluation    Date: 4/9/2024   Name: Obed Alfred  Clinic Number: [id number]    Therapy Diagnosis:   Encounter Diagnoses   Name Primary?    S/P cervical spinal fusion     Impaired mobility and ADLs Yes     Physician: Jessica Sommer PA-C    Physician Orders: PT Eval and Treat   Medical Diagnosis from Referral: Z98.1 (ICD-10-CM) - S/P cervical spinal fusion   Evaluation Date: 4/9/2024  Authorization Period Expiration: 4/3/2025  Plan of Care Expiration: 5/10/2024  Visit # / Visits authorized: 1/1    Time In: 9:00 am  Time Out: 9:45 am  Total Appointment Time (timed & untimed codes): 45 minutes    Precautions: Standard    Procedure: status post C3-5 Anterior cervical discectomy and fusion on 03/19/2024     Subjective   Date of onset:  03/19/2024   History of current condition - Cipriano reports: he has been having neck pain for approximately several months. He states he first noticed it when looking up began to cause arm pain from his elbow down to his wrist. He states at this point his chief problem is loss of hand function. He states he cannot write well/button pants/ use his zipper well/use a spoon or fork. He states from his elbow to his hands are just numb and at times burn/tingle. He states his neck is feeling okay. He denies any falls but sometimes feels off balance. He states he is hopeful he can improve and work one day but states he does not have the function for it right now. Patient does not report a specific mechanism of injury that led to this issue. He denies doing any therapy in the past. States he is driving when not on pain medication.     Medical History:   No past medical history on file.    Surgical History:   Obed Alfred  has a past surgical history that includes Anterior cervical discectomy w/ fusion (Right, 3/19/2024).    Medications:   Obed has a current medication list which includes the following prescription(s):  chlorthalidone, pregabalin, tizanidine, and valsartan.    Allergies:   Review of patient's allergies indicates:  No Known Allergies     Imaging: per MD note: Diagnostic Imaging:  I have personally reviewed cervical x-ray images obtained 2024 with patient which revealed stable alignment without hardware malfunction or complication.       Prior Therapy: N  Social History: lives with family   Occupation: none currently  Prior Level of Function: I  Current Level of Function: assisted needed to dress himself    Pain:  Current 5/10, worst 7/10, best 2/10   Location: bilateral elbow to hands    Description: Burning, Tingling, and Numb  Aggravating Factors: all times  Easing Factors: nothing    Pt's goals: decrease pain and improve function of hands    Objective   Observation: calm and cooperative      Posture:   Forward head posture and rounded shoulders  Shoulder girdle positioning:    R - downwardly rotated   L - downwardly rotated  Upper Thoracic Spine  Kyphotic   Middle Thoracic Spine  Flat       Cervical Range of Motion:     Degrees Pain Normal   Flexion 40 N     80-90 deg   Extension 50 N     normal ROM: 70-80 deg   Right Rotation 60 N     70-90 degrees   Left Rotation 60 N     70-90 degrees                           Thoracic Rot (R/L) 25% limited N, increased motion from TL junction 30 degrees      Shoulder Active Range of Motion: full and pain-free with Apley's scratch test; full and pain-free flexion B    UQ Myotomes/Upper Extremity Strength    C1-C2: 4/5  C3: 4/5    (R) UE  (L) UE    C4: 5/5 C4: 5/5   Shoulder Abduction: 5/5 Shoulder abduction: 5/5   Shoulder IR 5/5 Shoulder IR 5/5   Shoulder ER 4+/5 Shoulder ER 4+/5    4+/5 : 4+/5   Elbow Flex 5/5 Elbow Flex  5/5   Elbow Ext 5/5 Elbow Ext 5/5   Wrist Flex 4/5 Wrist Flex 4/5   Wrist Ext 4/5 Wrist Ext 4/5   Thumb Ext 4/5 Thumb Ext 4/5   Finger Abd/Add 4/5 Finger Abd/Add 4/5       Functional TestinxSTS - 13s without UE support    TUG - 10s  without AD      Table: Population Norms for TUG    Age  Average TUG    60 - 69 years  8.1 seconds    70 - 79 years  9.2 seconds    80 - 99 years  11.3 seconds      Balance Assessment:       Evaluation   Single Limb Stance R LE 8s  (<10 sec = HIGH FALL RISK)   Single Limb Stance L LE 12s  (<10 sec = HIGH FALL RISK)     Joint Mobility:   Cervical: NT  Thoracic: hypomobile throughout    DTR:    Brachioradialis: 1+ B   Triceps: 0 B   Biceps: 0 on R; +2 on L   Tabor's: (-) B   Inverse Supinator: (-) B     Sensation: dysesthesia from elbow to hands B         Limitation/Restriction for FOTO Cervical Survey    Therapist reviewed FOTO scores for Obed Alfred on 4/9/2024.   FOTO documents entered into [employer] - see Media section.    Score: 43%  Predicted: 57%       TREATMENT   Treatment Time In: 930 am  Treatment Time Out: 945 am  Total Treatment time (time-based codes) separate from Evaluation: 15 minutes    Cipriano received therapeutic exercises to develop ROM, flexibility, and posture for 15 minutes including:    Supine Chin Tucks x 15   Seated T-spine Ext in chair x 15   Seated Pushing Median N Glides x 15   Bridges 2 x 10   Education - HEP / get OT referral for hands / PoC    Home Exercises and Patient Education Provided    Education provided:   - Home Exercise Program  - get OT referral for hands  - avoid end-range of cervical ROM  - no driving on pain meds    Written Home Exercises Provided: yes.  Exercises were reviewed and Cipriano was able to demonstrate them prior to the end of the session.  Cipriano demonstrated good  understanding of the education provided.     See EMR under Patient Instructions for exercises provided 4/9/2024.    Assessment   Obed is a 53 y.o. male referred to outpatient Physical Therapy with a medical diagnosis of  S/P cervical spinal fusion. Pt presents with decreased cervical/thoracic ROM, UE weakness, and functional limitations of difficulty with hand usage completing ADL's. Due to patient chief  complaints, I recommended he pursue an OT order from his doctor and continue PT at 1x/week for a month to promote his other deficits. Pt agreeable.     Pt prognosis is Good.   Pt will benefit from skilled outpatient Physical Therapy to address the deficits stated above and in the chart below, provide pt/family education, and to maximize pt's level of independence.     Plan of care discussed with patient: Yes  Pt's spiritual, cultural and educational needs considered and patient is agreeable to the plan of care and goals as stated below:     Anticipated Barriers for therapy: chronic nerve compression    Medical Necessity is demonstrated by the following  History  Co-morbidities and personal factors that may impact the plan of care Co-morbidities:   high BMI    Personal Factors:   no deficits     low   Examination  Body Structures and Functions, activity limitations and participation restrictions that may impact the plan of care Body Regions:   head  neck  lower extremities  upper extremities    Body Systems:    gross symmetry  ROM  strength  gross coordinated movement  balance  gait  transfers  motor control    Participation Restrictions:   work    Activity limitations:   no deficits    General Tasks and Commands  no deficits    Communication  no deficits    Mobility  lifting and carrying objects  fine hand use (grasping/picking up)  walking  driving (bike, car, motorcycle)    Self care  washing oneself (bathing, drying, washing hands)  caring for body parts (brushing teeth, shaving, grooming)  toileting  dressing  eating  drinking  looking after one's health    Domestic Life  doing house work (cleaning house, washing dishes, laundry)  assisting others    Interactions/Relationships  no deficits    Life Areas  no deficits    Community and Social Life  community life  recreation and leisure         low   Clinical Presentation stable and uncomplicated low   Decision Making/ Complexity Score: low     Goals:  Long Term  Goals (4 Weeks):   1. Pt will be independent with HEP to supplement PT in improving pain free cervical mobility  2. Pt will improve SLB to 10s B to demonstrate improve balance  3. Pt will improve UE MMTs by 1/2 grade in all planes to improve strength for lifting and carrying tasks.  4. Pt will demonstrate improved neural symptoms will no longer feeling numbness  5. Pt will improve FOTO to </=predicted% limitation to improve perceived limitation with changing and maintaining mobility.    Plan   Plan of care Certification: 4/9/2024 to 5/10/2024.    Outpatient Physical Therapy 1 times weekly for 4 weeks to include the following interventions: Gait Training, Manual Therapy, Moist Heat/ Ice, Neuromuscular Re-ed, Patient Education, Self Care, Therapeutic Activities, and Therapeutic Exercise.     Nabeel Stern, PT

## 2024-04-11 DIAGNOSIS — R29.898 UPPER EXTREMITY WEAKNESS: Primary | ICD-10-CM

## 2024-04-16 ENCOUNTER — CLINICAL SUPPORT (OUTPATIENT)
Dept: REHABILITATION | Facility: HOSPITAL | Age: 54
End: 2024-04-16
Attending: NEUROLOGICAL SURGERY
Payer: MEDICAID

## 2024-04-16 ENCOUNTER — DOCUMENTATION ONLY (OUTPATIENT)
Dept: REHABILITATION | Facility: HOSPITAL | Age: 54
End: 2024-04-16

## 2024-04-16 DIAGNOSIS — R29.898 UPPER EXTREMITY WEAKNESS: ICD-10-CM

## 2024-04-16 DIAGNOSIS — R29.898 WEAKNESS OF BOTH HANDS: Primary | ICD-10-CM

## 2024-04-16 PROCEDURE — 97165 OT EVAL LOW COMPLEX 30 MIN: CPT | Mod: PN

## 2024-04-16 NOTE — PATIENT INSTRUCTIONS
4-16-24  -do below putty routine with tan and yellow putty issued today  -do 2 sets of 20, 3 days a week (1 day of rest in between sessions)   *squeeze putty  *pinch between tip of thumb and side of index (like a key)  *pinch between tip of thumb, index, and long  *pinch between tip of thumb and index  -do tan putty for 1 month then switch to yellow putty, do yellow putty for at least 2 months

## 2024-04-16 NOTE — PROGRESS NOTES
Sent in basket message to referring provider to see if we can do strengthening of elbow and shoulder-see order and OT evaluation for more info

## 2024-04-16 NOTE — PLAN OF CARE
[facility] Occupational Therapy  Initial Evaluation     Date: 4/16/2024  Name: Obed Alfred  Clinic Number: [id number]    Therapy Diagnosis:   Encounter Diagnoses   Name Primary?    Upper extremity weakness     Weakness of both hands Yes     Physician: Taz Clayton, DO    Physician Orders: evaluate and tx, see epic  Medical Diagnosis: arm weakness    Date of procedure:  March 19, 2024    Preoperative diagnosis:    1. Cervical spinal stenosis    2. Cervical degenerative disc disease     3. Cervical myelopathy     4. Obesity    Postoperative diagnosis:    1. Same     Procedures:    1. Anterior cervical diskectomy C3-4, C4-5     3. Anterior cervical interbody arthrodesis C3-4, C4-5    4. Insertion of biomechanical device C3-4, C4-5     5. Anterior cervical plating C3-C5     6. Harvesting local autograft    7. Implantation of allograft    8. Intraoperative neuro monitoring           See surgical report for details if applicable      Evaluation Date: 4/16/2024  Insurance Authorization Period Expiration: referral [id number] 4- thru 12-31-24           Plan of Care Certification Period: 4-16-24 thru 7-9-24                            Date of Return to referral source's office: see epic    Visit # / Visits authorized: 1 / 1 referral [id number] 4-11-24 thru 12-31-24  Time In:1  Time Out: 125  Total Billable Time: 0 minutes since HEP took less than 5 minutes    Precautions:  universal, see epic, protocol if applicable  Subjective     Involved Side: both  Dominant Side: left  Date of Onset: 2 weeks or so pre surgery  History of Current Condition/Mechanism of Injury: insidious onset of hand weakness as well as parasthesias  Imaging: see epic  Previous Therapy: none for above dx    Past Medical History/Physical Systems Review:   Obed Alfred  has no past medical history on file.    Obed Alfred  has a past surgical history that includes Anterior cervical discectomy w/ fusion (Right,  3/19/2024).    Obed has a current medication list which includes the following prescription(s): chlorthalidone, oxycodone-acetaminophen, pregabalin, and valsartan.    Review of patient's allergies indicates:  No Known Allergies     Patient's Goals for Therapy: full painless use    Pain:  Functional Pain Scale Rating 0-10:     No pain reported  Occupation:  cle[employer]  Working presently: yes  Duties: per job if working; typical self and home care    Functional Limitations/Social History:    Previous functional status includes: Independent with all ADLs.     Current Functional Status   Home/Living environment : lives with spouse    Limitation of Functional Status as follows: decreased motion, use, and strength with ADL   ADLs/IADLs:               See above   Leisure: not tested  pain meds: per referring provider   nicotine: denies  caffeine: 1 cup regular coffee daily    Objective   Posture:wnl at hands, no atrophy or nerve palsy posturing  Palpation:not tested  Sensation:constant diffuse tingling in both hands  ROM:wnl a/prom wrist and hand  DME:says has bilateral prefabricated wrist splints issued prior to above surgery        CMS Impairment/Limitation/Restriction for FOTO Survey    Therapist reviewed FOTO scores for Obed Alfred on 4/16/2024.   FOTO documents entered into [email or web address] - see Media section.             Sent teams message with staff PT here to see if they plan on working on UE strengthening, his response was he is going to address legs and neck    OT order dx is arm weakness but location to work on in order is hands    Treatment     Treatment Time In: 120  Treatment Time Out: 125  Total Treatment time separate from Evaluation time:5      Cipriano received therapeutic exercises for 5 minutes including:  -see above and/or media section                    Home Exercise Program/Education:  Issued HEP (see patient instructions in EMR in media or note) . Exercises were reviewed and Cipriano was able to demonstrate  them prior to the end of the session.   Pt received a written copy of exercises to perform at home. Cipriano demonstrated good understanding of the education provided.  Pt was advised to perform these exercises free of pain, and to stop performing them if pain occurs.    Patient/Family Education: role of OT, goals for OT, scheduling/cancellations - pt verbalized understanding. Discussed insurance limitations with patient.    Additional Education provided: likely tx progression, expectations of rehab    Assessment     Obed Alfred is a 53 y.o. male referred to outpatient occupational therapy and presents with a medical diagnosis of see above resulting in Decreased muscle strength and demonstrates limitations as described in the chart below. Following medical record review it is determined that pt will benefit from occupational therapy services in order to maximize pain free and/or functional use of both hands. The following goals were discussed with the patient and patient is in agreement with them as to be addressed in the treatment plan. The patient's rehab potential is good.     Anticipated barriers to occupational therapy: weakness  Pt has no cultural, educational or language barriers to learning provided.    Profile and History Assessment of Occupational Performance Level of Clinical Decision Making Complexity Score   Occupational Profile:   Obed Alfred is a 53 y.o. male who lives with their spouse and is currently employed Obed Alfred has difficulty with  ADLs and IADLs as listed previously, which  affecting his/her daily functional abilities.      Comorbidities:    has no past medical history on file.    Medical and Therapy History Review:   Brief               Performance Deficits    Physical:  Muscle Power/Strength    Cognitive:  No Deficits    Psychosocial:    No Deficits     Clinical Decision Making:  low    Assessment Process:  Problem-Focused Assessments    Modification/Need for Assistance:  Not  Necessary    Intervention Selection:  Limited Treatment Options       low  Based on PMHX, co morbidities , data from assessments and functional level of assistance required with task and clinical presentation directly impacting function.           The following goals were discussed with the patient and patient is in agreement with them as to be addressed in the treatment plan.     Goals:   Short term goals to be met in 2 weeks 1. Patient will be I with HEP     Long term goals to be met by d/c 1. Patient will be I with d/c HEP      all goals ongoing unless noted above or met today or in past but not noted yet    Plan   Certification Period/Plan of care expiration: 4/16/2024 to             7-9-24    Outpatient Occupational Therapy  2 times weekly for 12 weeks to include the following interventions: eval and tx    Above frequency and duration in above dates may change based on patient progress and need for therapy    Isaac NAVARRO CHT

## 2024-04-22 ENCOUNTER — DOCUMENTATION ONLY (OUTPATIENT)
Dept: REHABILITATION | Facility: HOSPITAL | Age: 54
End: 2024-04-22
Payer: MEDICAID

## 2024-04-22 ENCOUNTER — PATIENT MESSAGE (OUTPATIENT)
Dept: NEUROSURGERY | Facility: CLINIC | Age: 54
End: 2024-04-22
Payer: MEDICAID

## 2024-04-22 ENCOUNTER — TELEPHONE (OUTPATIENT)
Dept: REHABILITATION | Facility: HOSPITAL | Age: 54
End: 2024-04-22
Payer: MEDICAID

## 2024-04-22 NOTE — PROGRESS NOTES
Sent in basket message today to referring provider's staff to ask referring provider to see if we can do strengthening shoulder thru hand    Sent in basket last week with same question to referring provider but have not gotten a response

## 2024-04-22 NOTE — PROGRESS NOTES
Sent teams message to  less than 5 minutes ago asking them to call and schedule patient (referring provider's staff returned my in basket message)

## 2024-04-24 RX ORDER — OXYCODONE AND ACETAMINOPHEN 5; 325 MG/1; MG/1
1 TABLET ORAL EVERY 8 HOURS PRN
Qty: 21 TABLET | Refills: 0 | Status: SHIPPED | OUTPATIENT
Start: 2024-04-24 | End: 2024-05-22

## 2024-04-24 RX ORDER — PREGABALIN 75 MG/1
75 CAPSULE ORAL 3 TIMES DAILY
Qty: 270 CAPSULE | Refills: 0 | Status: SHIPPED | OUTPATIENT
Start: 2024-04-24 | End: 2024-07-23

## 2024-04-29 ENCOUNTER — CLINICAL SUPPORT (OUTPATIENT)
Dept: REHABILITATION | Facility: HOSPITAL | Age: 54
End: 2024-04-29
Payer: MEDICAID

## 2024-04-29 DIAGNOSIS — R29.898 UPPER EXTREMITY WEAKNESS: Primary | ICD-10-CM

## 2024-04-29 DIAGNOSIS — R29.898 WEAKNESS OF BOTH HANDS: ICD-10-CM

## 2024-04-29 PROCEDURE — 97110 THERAPEUTIC EXERCISES: CPT | Mod: PN

## 2024-04-29 NOTE — PROGRESS NOTES
Occupational Therapy Daily Treatment Note     Date: 4/29/2024  Name: Obed Alfred  Clinic Number: [id number]    Therapy Diagnosis:   Encounter Diagnoses   Name Primary?    Upper extremity weakness Yes    Weakness of both hands      Physician: Jessica Sommer PA-C      Physician Orders: evaluate and tx, see epic  Medical Diagnosis: arm weakness     Date of procedure:  March 19, 2024    Preoperative diagnosis:    1. Cervical spinal stenosis    2. Cervical degenerative disc disease     3. Cervical myelopathy     4. Obesity    Postoperative diagnosis:    1. Same     Procedures:    1. Anterior cervical diskectomy C3-4, C4-5     3. Anterior cervical interbody arthrodesis C3-4, C4-5    4. Insertion of biomechanical device C3-4, C4-5     5. Anterior cervical plating C3-C5     6. Harvesting local autograft    7. Implantation of allograft    8. Intraoperative neuro monitoring               See surgical report for details if applicable        Evaluation Date: 4/16/2024  Insurance Authorization Period Expiration: referral [id number]           Plan of Care Certification Period: 4-16-24 thru 7-9-24                            Date of Return to referral source's office: see epic     Visit # / Visits authorized: 1 / 8 referral [id number]  Time In:1  Time Out: 125    Time In:2  Time Out: 3  Total Billable Time: 60 minutes    Precautions:  universal, see epic, protocol if applicable    Subjective     Pt reports: an understanding he has generalized weakness in shoulders  he is compliant with home exercise program.  Response to previous treatment:good  Functional change: none stated    Pain: 0/10 rest; 0/10 light use  Side:bilateral  Location: arms        Objective       Timed units:  4 therapeutic exercise                            time:2-3        Measurements/special tests/observations:     Full prom all planes in bilateral shoulders    Wallace screen shows bilateral scapula malpositioning and dyskinesis    MMT scaption right 3+/5 left  3+/5    Arom             right           left  flex              180             180  abd             180              180        Fluidotherapy: n/a    Ultrasound:n/a      Upper body ergometer: Level 1 x 10 min. (5 min. forward, 5 min. backward)    Scar massage: n/a    Stretches as tolerated-pain free: n/a      Manual: n/a    Range of motion:     Bilateral arom supine protraction 3 x 10    Progressive resistive exercise:     Bilateral theraband 2 x 20: gray mid row, add, IR 0 abd    Home exercise program: see above and/or below    In basket messaging since last OT visit states no restrictions on arms, hands, or shoulders per referring provider        Home Exercises and Education Provided     Education provided:     General intent of rehab is to strengthen shoulder complexes to promote over shoulder level use with ADL          progress towards goals   likely treatment progression  rationale of rehab interventions    Written Home Exercises/Information Provided:  no .        previously issued exercises and/or other issued home therapy instructions were reviewed if still part of current treatment plan as well as any issued today and Cipriano was able to demonstrate them prior to the end of the session.  Cipriano demonstrated good understanding of the HEP provided.     See EMR under patient instructions and/or media for HEP issued today or in past    Assessment       Good effort and technique with clinic exercises      Pt would continue to benefit from skilled occupational therapy in order to facilitate strong, painless, and full use.    Cipriano is progressing well towards his goals and there are no updates to goals at this time unless goals noted below are different than goals on previous notes or evaluation. Pt prognosis is good  Pt will continue to benefit from skilled outpatient occupational therapy to address the deficits listed in the problem list on initial evaluation to provide pt/family education and to maximize pt's level  of independence in the home and community environment.     Anticipated barriers to occupational therapy: weakness, chronicity of condition prior to surgery    Pt's spiritual, cultural and educational needs considered and pt agreeable to plan of care and goals.    Goals:  Short term goals to be met in 2 weeks 1. Patient will be I with HEP      Long term goals to be met by d/c 1. Patient will be I with d/c HEP       all goals ongoing unless noted above or met today or in past but not noted yet            Any goals met today ?  (if any met see above)    Updates/Grading for next session: as needed    Plan: evaluate and treat    Isaac NAVARRO CHT

## 2024-05-07 ENCOUNTER — CLINICAL SUPPORT (OUTPATIENT)
Dept: REHABILITATION | Facility: HOSPITAL | Age: 54
End: 2024-05-07
Payer: MEDICAID

## 2024-05-07 ENCOUNTER — HOSPITAL ENCOUNTER (EMERGENCY)
Facility: HOSPITAL | Age: 54
Discharge: HOME | End: 2024-05-07
Attending: EMERGENCY MEDICINE
Payer: MEDICAID

## 2024-05-07 VITALS
OXYGEN SATURATION: 99 % | RESPIRATION RATE: 19 BRPM | HEIGHT: 69 IN | HEART RATE: 83 BPM | TEMPERATURE: 99 F | SYSTOLIC BLOOD PRESSURE: 161 MMHG | WEIGHT: 235 LBS | BODY MASS INDEX: 34.8 KG/M2 | DIASTOLIC BLOOD PRESSURE: 78 MMHG

## 2024-05-07 DIAGNOSIS — M10.9 ACUTE GOUT OF RIGHT KNEE, UNSPECIFIED CAUSE: ICD-10-CM

## 2024-05-07 DIAGNOSIS — M79.89 PAIN AND SWELLING OF LOWER EXTREMITY: ICD-10-CM

## 2024-05-07 DIAGNOSIS — M79.606 PAIN AND SWELLING OF LOWER EXTREMITY: ICD-10-CM

## 2024-05-07 DIAGNOSIS — R29.898 UPPER EXTREMITY WEAKNESS: Primary | ICD-10-CM

## 2024-05-07 DIAGNOSIS — N17.9 AKI (ACUTE KIDNEY INJURY): Primary | ICD-10-CM

## 2024-05-07 DIAGNOSIS — M25.569 KNEE PAIN: ICD-10-CM

## 2024-05-07 LAB
ALBUMIN SERPL BCP-MCNC: 4.2 G/DL (ref 3.5–5.2)
ALP SERPL-CCNC: 77 U/L (ref 55–135)
ALT SERPL W/O P-5'-P-CCNC: 9 U/L (ref 10–44)
ANION GAP SERPL CALC-SCNC: 8 MMOL/L (ref 8–16)
AST SERPL-CCNC: 16 U/L (ref 10–40)
BASOPHILS # BLD AUTO: 0.03 K/UL (ref 0–0.2)
BASOPHILS NFR BLD: 0.5 % (ref 0–1.9)
BILIRUB SERPL-MCNC: 0.6 MG/DL (ref 0.1–1)
BNP SERPL-MCNC: 27 PG/ML (ref 0–99)
BUN SERPL-MCNC: 24 MG/DL (ref 6–20)
CALCIUM SERPL-MCNC: 9.3 MG/DL (ref 8.7–10.5)
CHLORIDE SERPL-SCNC: 105 MMOL/L (ref 95–110)
CO2 SERPL-SCNC: 25 MMOL/L (ref 23–29)
CREAT SERPL-MCNC: 1.7 MG/DL (ref 0.5–1.4)
DIFFERENTIAL METHOD BLD: ABNORMAL
EOSINOPHIL # BLD AUTO: 0.1 K/UL (ref 0–0.5)
EOSINOPHIL NFR BLD: 1.1 % (ref 0–8)
ERYTHROCYTE [DISTWIDTH] IN BLOOD BY AUTOMATED COUNT: 13 % (ref 11.5–14.5)
ERYTHROCYTE [SEDIMENTATION RATE] IN BLOOD BY WESTERGREN METHOD: 38 MM/HR (ref 0–10)
EST. GFR  (NO RACE VARIABLE): 47 ML/MIN/1.73 M^2
GLUCOSE SERPL-MCNC: 140 MG/DL (ref 70–110)
HCT VFR BLD AUTO: 34.7 % (ref 40–54)
HGB BLD-MCNC: 10.3 G/DL (ref 14–18)
IMM GRANULOCYTES # BLD AUTO: 0.06 K/UL (ref 0–0.04)
IMM GRANULOCYTES NFR BLD AUTO: 1 % (ref 0–0.5)
LYMPHOCYTES # BLD AUTO: 1.6 K/UL (ref 1–4.8)
LYMPHOCYTES NFR BLD: 24.7 % (ref 18–48)
MCH RBC QN AUTO: 25.1 PG (ref 27–31)
MCHC RBC AUTO-ENTMCNC: 29.7 G/DL (ref 32–36)
MCV RBC AUTO: 85 FL (ref 82–98)
MONOCYTES # BLD AUTO: 0.5 K/UL (ref 0.3–1)
MONOCYTES NFR BLD: 8.1 % (ref 4–15)
NEUTROPHILS # BLD AUTO: 4.1 K/UL (ref 1.8–7.7)
NEUTROPHILS NFR BLD: 64.6 % (ref 38–73)
NRBC BLD-RTO: 0 /100 WBC
PLATELET # BLD AUTO: 239 K/UL (ref 150–450)
PMV BLD AUTO: 10.3 FL (ref 9.2–12.9)
POTASSIUM SERPL-SCNC: 4.8 MMOL/L (ref 3.5–5.1)
PROT SERPL-MCNC: 7.5 G/DL (ref 6–8.4)
RBC # BLD AUTO: 4.1 M/UL (ref 4.6–6.2)
SODIUM SERPL-SCNC: 138 MMOL/L (ref 136–145)
URATE SERPL-MCNC: 8.5 MG/DL (ref 3.4–7)
WBC # BLD AUTO: 6.28 K/UL (ref 3.9–12.7)

## 2024-05-07 PROCEDURE — 83880 ASSAY OF NATRIURETIC PEPTIDE: CPT | Performed by: EMERGENCY MEDICINE

## 2024-05-07 PROCEDURE — 97530 THERAPEUTIC ACTIVITIES: CPT | Mod: PN

## 2024-05-07 PROCEDURE — 85025 COMPLETE CBC W/AUTO DIFF WBC: CPT | Performed by: EMERGENCY MEDICINE

## 2024-05-07 PROCEDURE — 96361 HYDRATE IV INFUSION ADD-ON: CPT

## 2024-05-07 PROCEDURE — 96374 THER/PROPH/DIAG INJ IV PUSH: CPT

## 2024-05-07 PROCEDURE — 25000003 PHARM REV CODE 250: Performed by: EMERGENCY MEDICINE

## 2024-05-07 PROCEDURE — 85651 RBC SED RATE NONAUTOMATED: CPT | Performed by: EMERGENCY MEDICINE

## 2024-05-07 PROCEDURE — 63600175 PHARM REV CODE 636 W HCPCS: Performed by: EMERGENCY MEDICINE

## 2024-05-07 PROCEDURE — 80053 COMPREHEN METABOLIC PANEL: CPT | Performed by: EMERGENCY MEDICINE

## 2024-05-07 PROCEDURE — 99285 EMERGENCY DEPT VISIT HI MDM: CPT | Mod: 25

## 2024-05-07 PROCEDURE — 84550 ASSAY OF BLOOD/URIC ACID: CPT | Performed by: EMERGENCY MEDICINE

## 2024-05-07 RX ORDER — METHYLPREDNISOLONE SOD SUCC 125 MG
125 VIAL (EA) INJECTION
Status: DISCONTINUED | OUTPATIENT
Start: 2024-05-07 | End: 2024-05-07

## 2024-05-07 RX ORDER — HYDROCODONE BITARTRATE AND ACETAMINOPHEN 5; 325 MG/1; MG/1
1 TABLET ORAL EVERY 4 HOURS PRN
Qty: 10 TABLET | Refills: 0 | Status: SHIPPED | OUTPATIENT
Start: 2024-05-07

## 2024-05-07 RX ORDER — SODIUM CHLORIDE 9 MG/ML
1000 INJECTION, SOLUTION INTRAVENOUS
Status: COMPLETED | OUTPATIENT
Start: 2024-05-07 | End: 2024-05-07

## 2024-05-07 RX ORDER — DEXAMETHASONE SODIUM PHOSPHATE 4 MG/ML
8 INJECTION, SOLUTION INTRA-ARTICULAR; INTRALESIONAL; INTRAMUSCULAR; INTRAVENOUS; SOFT TISSUE
Status: COMPLETED | OUTPATIENT
Start: 2024-05-07 | End: 2024-05-07

## 2024-05-07 RX ADMIN — DEXAMETHASONE SODIUM PHOSPHATE 8 MG: 4 INJECTION, SOLUTION INTRA-ARTICULAR; INTRALESIONAL; INTRAMUSCULAR; INTRAVENOUS; SOFT TISSUE at 07:05

## 2024-05-07 RX ADMIN — SODIUM CHLORIDE 1000 ML: 9 INJECTION, SOLUTION INTRAVENOUS at 07:05

## 2024-05-07 NOTE — FIRST PROVIDER EVALUATION
Emergency Department TeleTriage Encounter Note      CHIEF COMPLAINT    Chief Complaint   Patient presents with    Knee Pain     For one week, right knee, with lower extremity swollen       VITAL SIGNS   Initial Vitals [05/07/24 1420]   BP Pulse Resp Temp SpO2   (!) 148/81 84 18 99.2 °F (37.3 °C) 98 %      MAP       --            ALLERGIES    Review of patient's allergies indicates:  No Known Allergies    PROVIDER TRIAGE NOTE  This is a teletriage evaluation of a 53 y.o. male presenting to the ED complaining of right knee pain for 1.5-2 weeks.  No trauma.     Alert, no distress.     Initial orders will be placed and care will be transferred to an alternate provider when patient is roomed for a full evaluation. Any additional orders and the final disposition will be determined by that provider.         ORDERS  Labs Reviewed - No data to display    ED Orders (720h ago, onward)      Start Ordered     Status Ordering Provider    05/07/24 1447 05/07/24 1446  X-Ray Knee 3 View Right  1 time imaging         Ordered SALVADOR JOSEPH              Virtual Visit Note: The provider triage portion of this emergency department evaluation and documentation was performed via [employer], a HIPAA-compliant telemedicine application, in concert with a tele-presenter in the room. A face to face patient evaluation with one of my colleagues will occur once the patient is placed in an emergency department room.      DISCLAIMER: This note was prepared with [employer] voice recognition transcription software. Garbled syntax, mangled pronouns, and other bizarre constructions may be attributed to that software system.     Diagnosis: Multiple Myeloma  Regimen: IVIG  Cycle/Day: 45/  Is this a C1D1 appt?  No    Dr. Berman is supervising clinician today.    ECOG:   ECOG [23 0824]   ECOG Performance Status 0       Review and verified Advanced Directives: Yes: Advance Directive is in chart     Verified if patient has state DNR bracelet on: No; Full Code    Nursing Assessment:   A focused nursing assessment addressing the toxicity of chemotherapy was performed and the patient reports the following:    Anxiety/Depression/Insomnia: Anxiety: No, Depression: No and Insomnia: Yes: managing  Pain: YES, Pain Ratin, Location: hips and back, Pain Description: aches  During this visit, medication was administered to treat pain: No      Toxicity Assessment    Auditory/Ear  Assessment: Yes (Within Defined Limits)    Constitutional  Assessment: Yes (w/ Exceptions to WDL)  Fatigue: Grade 1  Insomnia: Grade 1    Dermatology/Skin  Assessment: Yes (Within Defined Limits)    Endocrine  Assessment: Yes (Within Defined Limits)    Gastrointestinal  Assessment: Yes (w/ Exceptions to WDL)  Diarrhea: Grade 1    Hemorrhage/Bleeding  Assessment: Yes (Within Defined Limits)    Infection  Assessment: Yes (Within Defined Limits)    Lymphatics  Assessment: Yes (Within Defined Limits)    Musculoskeletal  Assessment: Yes (Within Defined Limits)    Neurology  Assessment: Yes (w/ Exceptions to WDL)  Paresthesia: Grade 1 (hands and feet, ongoing)    Ocular  Assessment: Yes (Within Defined Limits)    Pain  Assessment: Yes (w/ Exceptions to WDL)  Pain: Grade 1 (hips and back)    Pulmonary/Upper Respiratory  Assessment: Yes (Within Defined Limits)    Genitourinary  Assessment: Yes (Within Defined Limits)        Patient confirms receipt of a signed copy of Anti-Cancer Treatment consent and verbalizes understanding of treatment plan: Yes.     Pre-Treatment: Treatment consent signed  Patient has valid pre-authorization  VS completed  Height and weight verified  Premed  orders, including hydration, are verified prior to administration  Treatment parameters verified in patient protocol  Lab results checked - MD notified; reviewed    Treatment: I have reviewed the following with the patient:  Name of chemo drug, duration and route of infusion, and reportable infusion-related symptoms.  Appearance and physical integrity of drugs meets standard of drug monograph; double checked & verified by two practitioners  Rate set on infusion pump is in alignment with ordered rate; double checked & verified by two practitioners  Blood return confirmed before, during and after treatment administered  Infusion pump used for non-vesicant drugs  Drugs were administered in proper sequencing  Refer to LDA and MAR for line assessment and medication administration    Post Treatment: Treatment tolerated well; no adverse reaction    Transfusion: Not needed    Integrative Medicine: No    Oral Chemotherapy: Yes, Patient is taking medication as prescribed.    Education: No new instructions needed    Next appointment scheduled: 7/18/23  Patient instructed to call the office with any questions or concerns.    Patient Discharged: patient discharged to home per self, ambulatory    LOS, Dr. Berman, 6/20/23  IVIG today  Continues revlimid 21 of 28 days -writer sent staff message to oral chemo pool.   Densitometry-order entered, TEDDY Landin, to schedule.-   FUV 4 weeks with Dr Hammonds with SPEP, immunoglbulins, light chains, CMP CBC-scheduled for 7/18/23 with Dr. Hammonds.

## 2024-05-07 NOTE — PROGRESS NOTES
Occupational Therapy Daily Treatment Note     Date: 5/7/2024  Name: Obed Alfred  Clinic Number: [id number]    Therapy Diagnosis:   Encounter Diagnosis   Name Primary?    Upper extremity weakness Yes     Physician: Taz Clayton,       Physician Orders: evaluate and tx, see epic  Medical Diagnosis: arm weakness     Date of procedure:  March 19, 2024    Preoperative diagnosis:    1. Cervical spinal stenosis    2. Cervical degenerative disc disease     3. Cervical myelopathy     4. Obesity    Postoperative diagnosis:    1. Same     Procedures:    1. Anterior cervical diskectomy C3-4, C4-5     3. Anterior cervical interbody arthrodesis C3-4, C4-5    4. Insertion of biomechanical device C3-4, C4-5     5. Anterior cervical plating C3-C5     6. Harvesting local autograft    7. Implantation of allograft    8. Intraoperative neuro monitoring               See surgical report for details if applicable        Evaluation Date: 4/16/2024  Insurance Authorization Period Expiration: referral [id number]           Plan of Care Certification Period: 4-16-24 thru 7-9-24                            Date of Return to referral source's office: see epic     Visit # / Visits authorized: 2 / 8 referral [id number]      Time In: 1  Time Out: 2  Total Billable Time: 60 minutes    Precautions:  universal, see epic, protocol if applicable    Subjective     Pt reports:   he is compliant with home exercise program.  Response to previous treatment:good  Functional change: none stated    Pain: 0/10 rest; 0/10 light use  Side:bilateral  Location: arms        Objective       Timed units:  4 therapeutic exercise                            time:1-2        Measurements/special tests/observations:     N/a      Fluidotherapy: n/a    Ultrasound:n/a      Upper body ergometer: Level 1 x 10 min. (5 min. forward, 5 min. backward)    Scar massage: n/a    Stretches as tolerated-pain free: n/a      Manual: n/a    Range of motion:     N/a    Progressive resistive  exercise:     Bilateral theraband 2 x 20: gray mid row, IR 0 abd, depression    Bilateral theraband 2 x 20: yellow ER 0 abd    Home exercise program: see above and/or below        Home Exercises and Education Provided     Education provided:     Explained likely estimate of d/c from clinic OT based on progress in therapy and current status        progress towards goals   likely treatment progression  rationale of rehab interventions    Written Home Exercises/Information Provided: yes      previously issued exercises and/or other issued home therapy instructions were reviewed if still part of current treatment plan as well as any issued today and Cipriano was able to demonstrate them prior to the end of the session.  Cipriano demonstrated good understanding of the HEP provided.     See EMR under patient instructions and/or media for HEP issued today or in past    Assessment       Strengthening for months post d/c likely will be optimal to maximize shoulder complex functional use long term      Pt would continue to benefit from skilled occupational therapy in order to facilitate strong, painless, and full use.    Cipriano is progressing well towards his goals and there are no updates to goals at this time unless goals noted below are different than goals on previous notes or evaluation. Pt prognosis is good  Pt will continue to benefit from skilled outpatient occupational therapy to address the deficits listed in the problem list on initial evaluation to provide pt/family education and to maximize pt's level of independence in the home and community environment.     Anticipated barriers to occupational therapy: weakness, chronicity of condition prior to surgery    Pt's spiritual, cultural and educational needs considered and pt agreeable to plan of care and goals.    Goals:  Short term goals to be met in 2 weeks 1. Patient will be I with HEP      Long term goals to be met by d/c 1. Patient will be I with d/c HEP       all goals  ongoing unless noted above or met today or in past but not noted yet            Any goals met today ?  (if any met see above)    Updates/Grading for next session: as needed    Plan: evaluate and treat    Isaac NAVARRO CHT

## 2024-05-08 NOTE — ED PROVIDER NOTES
Encounter Date: 5/7/2024       History     Chief Complaint   Patient presents with    Knee Pain     For one week, right knee, with lower extremity swollen     HPI  Review of patient's allergies indicates:  No Known Allergies  No past medical history on file.  Past Surgical History:   Procedure Laterality Date    ANTERIOR CERVICAL DISCECTOMY W/ FUSION Right 3/19/2024    Procedure: DISCECTOMY, SPINE, CERVICAL, ANTERIOR APPROACH, WITH FUSION;  Surgeon: Taz Clayton DO;  Location: [facility] OR;  Service: Neurosurgery;  Laterality: Right;  acdf C3-5     No family history on file.  Social History     Tobacco Use    Smoking status: Never    Smokeless tobacco: Never     Review of Systems    Physical Exam     Initial Vitals [05/07/24 1420]   BP Pulse Resp Temp SpO2   (!) 148/81 84 18 99.2 °F (37.3 °C) 98 %      MAP       --         Physical Exam    ED Course   Procedures  Labs Reviewed   CBC W/ AUTO DIFFERENTIAL - Abnormal; Notable for the following components:       Result Value    RBC 4.10 (*)     Hemoglobin 10.3 (*)     Hematocrit 34.7 (*)     MCH 25.1 (*)     MCHC 29.7 (*)     Immature Granulocytes 1.0 (*)     Immature Grans (Abs) 0.06 (*)     All other components within normal limits   COMPREHENSIVE METABOLIC PANEL - Abnormal; Notable for the following components:    Glucose 140 (*)     BUN 24 (*)     Creatinine 1.7 (*)     ALT 9 (*)     eGFR 47.0 (*)     All other components within normal limits   SEDIMENTATION RATE - Abnormal; Notable for the following components:    Sed Rate 38 (*)     All other components within normal limits   URIC ACID - Abnormal; Notable for the following components:    Uric Acid 8.5 (*)     All other components within normal limits   B-TYPE NATRIURETIC PEPTIDE          Imaging Results              US Lower Extremity Veins Right (Final result)  Result time 05/07/24 17:49:17      Final result by Benny Gutierrez MD (05/07/24 17:49:17)                   Impression:      1. No evidence of a deep vein  thrombosis within the right lower extremity.  2. Tiny popliteal fossa (Baker's) cyst.      Electronically signed by: Benny Gutierrez  Date:    05/07/2024  Time:    17:49               Narrative:    EXAMINATION:  US LOWER EXTREMITY VEINS RIGHT    CLINICAL HISTORY:  Pain in leg, unspecified    TECHNIQUE:  Duplex and color flow Doppler evaluation and graded compression of the right lower extremity veins was performed.    COMPARISON:  None    FINDINGS:  Right thigh veins: The common femoral, femoral, popliteal, upper greater saphenous, and deep femoral veins are patent and free of thrombus. The veins are normally compressible and have normal phasic flow and augmentation response.    Right calf veins: The visualized calf veins are patent.    Miscellaneous: Small popliteal fossa fluid collection measuring 1.1 cm.                                       X-Ray Knee Complete 4 or more Views Right (Final result)  Result time 05/07/24 15:51:46   Procedure changed from X-Ray Knee 3 View Right     Final result by Jeanette Reyes MD (05/07/24 15:51:46)                   Impression:      Negative exam.      Electronically signed by: Jeanette Reyes  Date:    05/07/2024  Time:    15:51               Narrative:    EXAMINATION:  XR KNEE COMP 4 OR MORE VIEWS RIGHT    CLINICAL HISTORY:  Pain;  Pain in unspecified knee    FINDINGS:  Four views right knee show no fracture, dislocation, or destructive osseous lesion. Soft tissues are unremarkable.                                       Medications   0.9%  NaCl infusion (0 mLs Intravenous Stopped 5/7/24 2059)   dexAMETHasone injection 8 mg (8 mg Intravenous Given 5/7/24 1958)     Medical Decision Making  Amount and/or Complexity of Data Reviewed  Labs: ordered.    Risk  Prescription drug management.                                      Clinical Impression:  Final diagnoses:  [M25.569] Knee pain  [M79.606, M79.89] Pain and swelling of lower extremity  [N17.9] AURELIA (acute kidney injury)  (Primary)  [M10.9] Acute gout of right knee, unspecified cause          ED Disposition Condition    Discharge Stable          ED Prescriptions       Medication Sig Dispense Start Date End Date Auth. Provider    HYDROcodone-acetaminophen (NORCO) 5-325 mg per tablet Take 1 tablet by mouth every 4 (four) hours as needed for Pain. 10 tablet 5/7/2024 -- Tran Christianson FNP          Follow-up Information       Follow up With Specialties Details Why Contact Info    [facility]  Schedule an appointment as soon as possible for a visit in 2 days  [address] [place] [id number]  [phone]      Juany Robertson MD Internal Medicine Schedule an appointment as soon as possible for a visit in 2 days  [address] [facility] [address]  [phone]               Tran Christianson FNP  05/07/24 8494

## 2024-05-08 NOTE — DISCHARGE INSTRUCTIONS
Please watch your blood sugars at home   Keep yourself hydrated   Norco as directed if needed for severe pain  Please follow-up with your primary care provider or [facility] for recheck, return if condition becomes worse for any concerns

## 2024-05-08 NOTE — PLAN OF CARE
05/08/24 1204   Discharge Reassessment   Assessment Type Discharge Planning Reassessment   Did the patient's condition or plan change since previous assessment? Yes   Post-Acute Status   Hospital Resources/Appts/Education Provided Appointments scheduled and added to AVS   Discharge Delays None known at this time     Pt has an appointment at [facility] with Dr. Negrete on 5/13/2024 @ 10:30 am

## 2024-05-14 ENCOUNTER — OFFICE VISIT (OUTPATIENT)
Dept: NEUROSURGERY | Facility: CLINIC | Age: 54
End: 2024-05-14
Payer: MEDICAID

## 2024-05-14 ENCOUNTER — HOSPITAL ENCOUNTER (OUTPATIENT)
Dept: RADIOLOGY | Facility: HOSPITAL | Age: 54
Discharge: HOME | End: 2024-05-14
Attending: HEALTH CARE PROVIDER
Payer: MEDICAID

## 2024-05-14 VITALS
WEIGHT: 235 LBS | DIASTOLIC BLOOD PRESSURE: 82 MMHG | HEART RATE: 75 BPM | SYSTOLIC BLOOD PRESSURE: 143 MMHG | BODY MASS INDEX: 34.8 KG/M2 | HEIGHT: 69 IN

## 2024-05-14 DIAGNOSIS — Z98.1 S/P CERVICAL SPINAL FUSION: ICD-10-CM

## 2024-05-14 DIAGNOSIS — Z98.1 S/P CERVICAL SPINAL FUSION: Primary | ICD-10-CM

## 2024-05-14 PROCEDURE — 4010F ACE/ARB THERAPY RXD/TAKEN: CPT | Mod: CPTII,,, | Performed by: HEALTH CARE PROVIDER

## 2024-05-14 PROCEDURE — 99024 POSTOP FOLLOW-UP VISIT: CPT | Mod: S$PBB,,, | Performed by: HEALTH CARE PROVIDER

## 2024-05-14 PROCEDURE — 3077F SYST BP >= 140 MM HG: CPT | Mod: CPTII,,, | Performed by: HEALTH CARE PROVIDER

## 2024-05-14 PROCEDURE — 3008F BODY MASS INDEX DOCD: CPT | Mod: CPTII,,, | Performed by: HEALTH CARE PROVIDER

## 2024-05-14 PROCEDURE — 3079F DIAST BP 80-89 MM HG: CPT | Mod: CPTII,,, | Performed by: HEALTH CARE PROVIDER

## 2024-05-14 PROCEDURE — 72040 X-RAY EXAM NECK SPINE 2-3 VW: CPT | Mod: TC

## 2024-05-14 PROCEDURE — 72040 X-RAY EXAM NECK SPINE 2-3 VW: CPT | Mod: 26,,, | Performed by: RADIOLOGY

## 2024-05-14 PROCEDURE — 1159F MED LIST DOCD IN RCRD: CPT | Mod: CPTII,,, | Performed by: HEALTH CARE PROVIDER

## 2024-05-14 PROCEDURE — 3044F HG A1C LEVEL LT 7.0%: CPT | Mod: CPTII,,, | Performed by: HEALTH CARE PROVIDER

## 2024-05-14 RX ORDER — PREGABALIN 75 MG/1
75 CAPSULE ORAL 2 TIMES DAILY
Qty: 180 CAPSULE | Refills: 1 | Status: SHIPPED | OUTPATIENT
Start: 2024-05-14 | End: 2024-08-12

## 2024-05-14 RX ORDER — TIZANIDINE 4 MG/1
4 TABLET ORAL EVERY 8 HOURS
Qty: 180 TABLET | Refills: 0 | Status: SHIPPED | OUTPATIENT
Start: 2024-05-14 | End: 2024-07-13

## 2024-05-14 NOTE — PROGRESS NOTES
"  Neurosurgery  Post-Operative Follow up    SUBJECTIVE:     History of Present Illness:  Obed Alfred is a 53 y.o. male  presents status post C3-5 Anterior cervical discectomy and fusion on 03/19/2024 for cervical spinal stenosis, degenerative disc disease and myelopathy.  Patient present for 6 week follow-up.  Today, patient reports bilateral hand numbness and stiffness that does improve with Lyrica.  He also reports upper extremity fatigue with prolonged activity.  He continues to perform ADLs without restrictions and walks on a daily basis.  He is also undergoing outpatient physical therapy.  He continues to take muscle relaxant and requests a refill.  He denies dysphagia, dysphonia or new extremity weakness.      OBJECTIVE:     Vital Signs  Pulse: 75  BP: (!) 143/82  Pain Score: 0-No pain  Height: 5' 9" (175.3 cm)  Weight: 106.6 kg (235 lb 0.2 oz)  Body mass index is 34.71 kg/m².    Neurosurgery Physical Exam  General:  No acute distress.  Neck: No tracheal deviation. No palpable masses.   Neurologic: Alert and oriented. Thought content appropriate.  GCS: E4 V5 M6; Total: 15  Pulmonary: normal respirations, no signs of respiratory distress  Skin: Skin is warm, dry and intact.    Motor Strength: Moves all extremities spontaneously with good tone. No abnormal movements seen.   Strength  Deltoids Triceps Biceps Wrist Extension Wrist Flexion Hand    Upper: R 5/5 5/5 5/5 5/5 5/5 5/5    L 5/5 5/5 5/5 5/5 5/5 5/5       Anterior Incision:  Well healed.  Nontender to palpation. No surrounding erythema or edema. No drainage from incision. No palpable hematoma or underlying fluid collection.    Diagnostic Imaging:  I have personally reviewed cervical x-ray images obtained 05/14/2024 with patient which revealed stable alignment without hardware malfunction or complication.    ASSESSMENT/PLAN:     1. S/P cervical spinal fusion      Patient is doing well postoperatively and no focal deficits seen on exam.  Anticipate " upper extremity fatigue with prolonged activity will decreased as he becomes stronger in physical therapy.  Recommend patient continue taking Lyrica and Zanaflex as prescribed.    Patient will follow-up in 3 months with cervical x-rays.          S/P cervical spinal fusion  -     X-Ray Cervical Spine 2 or 3 Views; Future; Expected date: 08/14/2024  -     pregabalin (LYRICA) 75 MG capsule; Take 1 capsule (75 mg total) by mouth 2 (two) times daily.  Dispense: 180 capsule; Refill: 1  -     tiZANidine (ZANAFLEX) 4 MG tablet; Take 1 tablet (4 mg total) by mouth every 8 (eight) hours.  Dispense: 180 tablet; Refill: 0          I spent a total of 20 minutes non-face and face to face time preparing to see the patient (eg, review of tests), obtaining and/or reviewing separately obtained history, documenting clinical information in the electronic or other health record, interpreting results and communicating results to the patient/family/caregiver, or care coordinator.    Jessica Sommer PA-C  Neurosurgery  Replaced by [facility]

## 2024-05-21 ENCOUNTER — CLINICAL SUPPORT (OUTPATIENT)
Dept: REHABILITATION | Facility: HOSPITAL | Age: 54
End: 2024-05-21
Payer: MEDICAID

## 2024-05-21 ENCOUNTER — DOCUMENTATION ONLY (OUTPATIENT)
Dept: REHABILITATION | Facility: HOSPITAL | Age: 54
End: 2024-05-21

## 2024-05-21 DIAGNOSIS — R29.898 UPPER EXTREMITY WEAKNESS: Primary | ICD-10-CM

## 2024-05-21 DIAGNOSIS — R29.898 WEAKNESS OF BOTH HANDS: ICD-10-CM

## 2024-05-21 NOTE — PROGRESS NOTES
Outpatient Therapy Discharge Summary     Date: 5-21-24      Name: Obed Alfred  [facility] Number: [id number]    Therapy Diagnosis: No diagnosis found.  Physician: No ref. provider found    Physician Orders: see evaluation  Medical Diagnosis: see evaluation  Evaluation Date: 4-16-24      Date of Last visit: 5-21-24  Total Visits Received: 4  Cancelled Visits: see epic  No Show Visits: see epic    Assessment    Goals: see last note    Discharge reason: Patient has reached the maximum rehab potential for the present time    Plan   This patient is discharged from Occupational Therapy

## 2024-05-21 NOTE — PROGRESS NOTES
Occupational Therapy Daily Treatment Note     Date: 5/21/2024  Name: Obed Alfred  Clinic Number: [id number]    Therapy Diagnosis:   Encounter Diagnoses   Name Primary?    Upper extremity weakness Yes    Weakness of both hands      Physician: Taz Clayton,       Physician Orders: evaluate and tx, see epic  Medical Diagnosis: arm weakness     Date of procedure:  March 19, 2024    Preoperative diagnosis:    1. Cervical spinal stenosis    2. Cervical degenerative disc disease     3. Cervical myelopathy     4. Obesity    Postoperative diagnosis:    1. Same     Procedures:    1. Anterior cervical diskectomy C3-4, C4-5     3. Anterior cervical interbody arthrodesis C3-4, C4-5    4. Insertion of biomechanical device C3-4, C4-5     5. Anterior cervical plating C3-C5     6. Harvesting local autograft    7. Implantation of allograft    8. Intraoperative neuro monitoring               See surgical report for details if applicable        Evaluation Date: 4/16/2024  Insurance Authorization Period Expiration: referral [id number]           Plan of Care Certification Period: 4-16-24 thru 7-9-24                            Date of Return to referral source's office: see epic     Visit # / Visits authorized: 3 / 8 referral [id number]      Time In: 1  Time Out: 2  Total Billable Time: 60 minutes    Precautions:  universal, see epic, protocol if applicable    Subjective     Pt reports:   he is compliant with home exercise program.  Response to previous treatment:good  Functional change: doing all light use with no problems    Pain: 0/10 rest; 0/10 light use  Side:bilateral  Location: arms        Objective       Timed units:  4 therapeutic exercise                            time:1-2        Measurements/special tests/observations:     N/a      Fluidotherapy: n/a    Ultrasound:n/a      Upper body ergometer: Level 1 x 10 min. (5 min. forward, 5 min. backward)    Scar massage: n/a    Stretches as tolerated-pain free: n/a      Manual:  n/a    Range of motion:     N/a    Progressive resistive exercise:      theraband 2 x 20: on right gray mid row, IR 0 abd, depression, protraction, add   theraband 2 x 20: on right yellow ER 0 abd    Home exercise program: see above and/or below        Home Exercises and Education Provided     Education provided:     Explained continued clinic OT is nonessential          Written Home Exercises/Information Provided: yes      previously issued exercises and/or other issued home therapy instructions were reviewed if still part of current treatment plan as well as any issued today and Cipriano was able to demonstrate them prior to the end of the session.  Cipriano demonstrated good understanding of the HEP provided.     See EMR under patient instructions and/or media for HEP issued today or in past    Assessment     D/c home program should help to maximize functional use long term      Pt's spiritual, cultural and educational needs considered and pt agreeable to plan of care and goals.    Goals:  Short term goals to be met in 2 weeks 1. Patient will be I with HEP -met 5-21-24     Long term goals to be met by d/c 1. Patient will be I with d/c HEP  -met 5-21-24     all goals ongoing unless noted above or met today or in past but not noted yet                Plan: d/c    Isaac NAVARRO CHT

## 2024-07-19 DIAGNOSIS — Z98.1 S/P CERVICAL SPINAL FUSION: ICD-10-CM

## 2024-07-19 RX ORDER — TIZANIDINE 4 MG/1
4 TABLET ORAL EVERY 8 HOURS
Qty: 180 TABLET | Refills: 0 | Status: SHIPPED | OUTPATIENT
Start: 2024-07-19

## 2024-07-19 NOTE — TELEPHONE ENCOUNTER
Please see the attached refill request. Last seen by you on and last prescribed on 5/14/24 for 60 day supply, surgery was on 3/19/24 for C3-5 ACDF, her next appt with you is on 8/13/24.

## 2024-07-31 ENCOUNTER — PATIENT MESSAGE (OUTPATIENT)
Dept: NEUROSURGERY | Facility: CLINIC | Age: 54
End: 2024-07-31
Payer: MEDICAID

## 2024-08-02 NOTE — TELEPHONE ENCOUNTER
Attempted to call pt to ask for more information regarding his neck pain, left voicemail with callback number.  
Attempted to contact pt again regarding his neck pain, unable to reach.  
Ambulatory with cane/crutches/walker

## 2024-08-12 PROBLEM — N17.9 AKI (ACUTE KIDNEY INJURY): Status: RESOLVED | Noted: 2024-05-07 | Resolved: 2024-08-12

## 2024-08-13 ENCOUNTER — OFFICE VISIT (OUTPATIENT)
Dept: NEUROSURGERY | Facility: CLINIC | Age: 54
End: 2024-08-13
Payer: MEDICAID

## 2024-08-13 ENCOUNTER — HOSPITAL ENCOUNTER (OUTPATIENT)
Dept: RADIOLOGY | Facility: HOSPITAL | Age: 54
Discharge: HOME | End: 2024-08-13
Attending: HEALTH CARE PROVIDER
Payer: MEDICAID

## 2024-08-13 VITALS
BODY MASS INDEX: 34.71 KG/M2 | DIASTOLIC BLOOD PRESSURE: 80 MMHG | HEART RATE: 64 BPM | SYSTOLIC BLOOD PRESSURE: 145 MMHG | HEIGHT: 69 IN

## 2024-08-13 DIAGNOSIS — Z98.1 S/P CERVICAL SPINAL FUSION: Primary | ICD-10-CM

## 2024-08-13 DIAGNOSIS — Z98.1 S/P CERVICAL SPINAL FUSION: ICD-10-CM

## 2024-08-13 DIAGNOSIS — M62.838 MUSCLE SPASM: ICD-10-CM

## 2024-08-13 PROCEDURE — 99999 PR PBB SHADOW E&M-EST. PATIENT-LVL III: CPT | Mod: PBBFAC,,, | Performed by: HEALTH CARE PROVIDER

## 2024-08-13 PROCEDURE — 3008F BODY MASS INDEX DOCD: CPT | Mod: CPTII,,, | Performed by: HEALTH CARE PROVIDER

## 2024-08-13 PROCEDURE — 99213 OFFICE O/P EST LOW 20 MIN: CPT | Mod: PBBFAC,PN | Performed by: HEALTH CARE PROVIDER

## 2024-08-13 PROCEDURE — 99214 OFFICE O/P EST MOD 30 MIN: CPT | Mod: S$PBB,,, | Performed by: HEALTH CARE PROVIDER

## 2024-08-13 PROCEDURE — 72040 X-RAY EXAM NECK SPINE 2-3 VW: CPT | Mod: TC

## 2024-08-13 PROCEDURE — 3077F SYST BP >= 140 MM HG: CPT | Mod: CPTII,,, | Performed by: HEALTH CARE PROVIDER

## 2024-08-13 PROCEDURE — 3044F HG A1C LEVEL LT 7.0%: CPT | Mod: CPTII,,, | Performed by: HEALTH CARE PROVIDER

## 2024-08-13 PROCEDURE — 3079F DIAST BP 80-89 MM HG: CPT | Mod: CPTII,,, | Performed by: HEALTH CARE PROVIDER

## 2024-08-13 PROCEDURE — 4010F ACE/ARB THERAPY RXD/TAKEN: CPT | Mod: CPTII,,, | Performed by: HEALTH CARE PROVIDER

## 2024-08-13 PROCEDURE — 72040 X-RAY EXAM NECK SPINE 2-3 VW: CPT | Mod: 26,,, | Performed by: RADIOLOGY

## 2024-08-13 PROCEDURE — 1159F MED LIST DOCD IN RCRD: CPT | Mod: CPTII,,, | Performed by: HEALTH CARE PROVIDER

## 2024-08-13 RX ORDER — METFORMIN HYDROCHLORIDE 500 MG/1
500 TABLET ORAL 2 TIMES DAILY
COMMUNITY
Start: 2024-08-04

## 2024-08-13 RX ORDER — TIZANIDINE 4 MG/1
4 TABLET ORAL EVERY 12 HOURS
Qty: 180 TABLET | Refills: 1 | Status: SHIPPED | OUTPATIENT
Start: 2024-08-13 | End: 2024-11-11

## 2024-08-13 RX ORDER — PREGABALIN 100 MG/1
100 CAPSULE ORAL 2 TIMES DAILY
Qty: 180 CAPSULE | Refills: 1 | Status: SHIPPED | OUTPATIENT
Start: 2024-08-13 | End: 2024-11-11

## 2024-08-13 NOTE — PROGRESS NOTES
"Neurosurgery  Post-Operative Follow up    SUBJECTIVE:     History of Present Illness:  Obed Alfred is a 54 y.o. male  presents status post C3-5 Anterior cervical discectomy and fusion on 03/19/2024 for cervical spinal stenosis, degenerative disc disease and myelopathy.  Patient present for his three-month follow-up.  Today, patient reports left neck stiffness which worsens at night prior to bedtime.  He also reports bilateral hand numbness which has improved since cervical fusion however symptoms completely resolved with Lyrica.  He is also requesting a refill for Lyrica and tizanidine  He has completed physical therapy and can perform ADLs without difficulty.  Patient also reports he has concerns about taking Tylenol for pain due to diabetic history. He denies incisional issues, dysphagia, dysphonia, new extremity weakness or paresthesias.    OBJECTIVE:     Vital Signs  Pulse: 64  BP: (!) 145/80  Pain Score:   1  Height: 5' 9" (175.3 cm)  Body mass index is 34.71 kg/m².    Neurosurgery Physical Exam  General:  No acute distress.    Neurologic: Alert and oriented. Thought content appropriate.  GCS: E4 V5 M6; Total: 15  Pulmonary: normal respirations, no signs of respiratory distress  Skin: Skin is warm, dry and intact.    Motor Strength: Moves all extremities spontaneously with good tone. No abnormal movements seen.   Bilateral upper extremity strength deltoid/biceps/triceps/hand  5/5     Anterior cervical Incision:  Well healed  Diagnostic Imaging:  I have personally reviewed cervical x-ray images obtained 08/13/2024 with patient which revealed stable alignment without hardware malfunction or complication.    ASSESSMENT/PLAN:     1. S/P cervical spinal fusion    2. Muscle spasm      Patient is doing well postoperatively and is neurologically intact.  Patient was offered additional physical therapy for muscle spasms however patient declined.  Lyrica and tizanidine prescription provided.  Patient advise he can " take Tylenol as needed for pain however he should not go over 4000 mg in a 24 hour.    Patient can follow-up as needed.          S/P cervical spinal fusion  -     pregabalin (LYRICA) 100 MG capsule; Take 1 capsule (100 mg total) by mouth 2 (two) times daily.  Dispense: 180 capsule; Refill: 1    Muscle spasm  -     tiZANidine (ZANAFLEX) 4 MG tablet; Take 1 tablet (4 mg total) by mouth every 12 (twelve) hours.  Dispense: 180 tablet; Refill: 1        I spent a total of 27 minutes non-face and face to face time preparing to see the patient (eg, review of tests), obtaining and/or reviewing separately obtained history, documenting clinical information in the electronic or other health record, interpreting results and communicating results to the patient/family/caregiver, or care coordinator.    Jessica Sommer PA-C  Neurosurgery  Formerly [facility]

## 2025-01-28 ENCOUNTER — PATIENT MESSAGE (OUTPATIENT)
Dept: NEUROSURGERY | Facility: CLINIC | Age: 55
End: 2025-01-28
Payer: MEDICAID

## 2025-01-28 DIAGNOSIS — Z98.1 S/P CERVICAL SPINAL FUSION: Primary | ICD-10-CM

## 2025-01-31 DIAGNOSIS — Z98.1 S/P CERVICAL SPINAL FUSION: Primary | ICD-10-CM

## 2025-01-31 RX ORDER — PREGABALIN 100 MG/1
100 CAPSULE ORAL 2 TIMES DAILY
Qty: 180 CAPSULE | Refills: 0 | Status: SHIPPED | OUTPATIENT
Start: 2025-01-31 | End: 2025-05-01

## 2025-02-03 RX ORDER — PREGABALIN 100 MG/1
100 CAPSULE ORAL 2 TIMES DAILY
Qty: 60 CAPSULE | Refills: 0 | Status: SHIPPED | OUTPATIENT
Start: 2025-02-03 | End: 2025-03-05

## 2025-06-10 DIAGNOSIS — Z98.1 S/P CERVICAL SPINAL FUSION: ICD-10-CM

## 2025-06-10 RX ORDER — PREGABALIN 100 MG/1
100 CAPSULE ORAL 2 TIMES DAILY
Qty: 180 CAPSULE | Refills: 0 | Status: SHIPPED | OUTPATIENT
Start: 2025-06-10

## 2025-06-10 NOTE — TELEPHONE ENCOUNTER
Please see the attached refill request. Last ordered on 1/31/25 for a 90 day supply. Pt is s/p ACDF C3-5 on 3/19/24, pt is f/u PRN. Should pt request refill from his PCP?

## (undated) DEVICE — SUT BONE WAX SYNTHETIC

## (undated) DEVICE — BLADE 4IN EDGE INSULATED

## (undated) DEVICE — DRAPE OPMI STERILE

## (undated) DEVICE — SPONGE SURGIFOAM 100 8.5X12X10

## (undated) DEVICE — GOWN ECLIPSE REINF LVL4 TWL LG

## (undated) DEVICE — STAPLER SKIN PROXIMATE WIDE

## (undated) DEVICE — PROTECTOR ULNAR NERVE FOAM

## (undated) DEVICE — BLADE ELECTRO EDGE INSULATED

## (undated) DEVICE — ADHESIVE MASTISOL VIAL 48/BX

## (undated) DEVICE — GOWN SMART IMP BREATHABLE XXLG

## (undated) DEVICE — COLLECTOR BONE VAC DUST AUTOLG

## (undated) DEVICE — GLOVE BIOGEL SKINSENSE PI 8.0

## (undated) DEVICE — Device

## (undated) DEVICE — DURAPREP SURG SCRUB 26ML

## (undated) DEVICE — COLLAR MIAMI J STOUT

## (undated) DEVICE — SUT ETHILON 2-0 PSLX 30IN

## (undated) DEVICE — NDL ECLIPSE SAFETY 23G 1.5IN

## (undated) DEVICE — PACK LAMINECTOMY

## (undated) DEVICE — SPONGE BULKEE II ABSRB 6X6.75

## (undated) DEVICE — COUNT NDL FOAM MAGNET 40COUNT

## (undated) DEVICE — ELECTRODE BLADE INSULATED 1 IN

## (undated) DEVICE — CATH JELCO TEFLON 16G 2IN

## (undated) DEVICE — SUT VICRYL 3-0 27 SH

## (undated) DEVICE — TRAY CATH 1-LYR URIMTR 16FR

## (undated) DEVICE — DRAPE C ARM 42 X 120 10/BX

## (undated) DEVICE — CORD BIPOLAR 12 FOOT

## (undated) DEVICE — YANKAUER FLEX NO VENT REG CAP

## (undated) DEVICE — STRIP MEDI WND CLSR 1X5IN

## (undated) DEVICE — DRAPE THREE-QUARTER 53X77IN

## (undated) DEVICE — NDL SPINAL 18GX3.5 SPINOCAN

## (undated) DEVICE — DRAPE PED LAP II 100X72X124IN

## (undated) DEVICE — DRAPE INCISE IOBAN 2 23X17IN

## (undated) DEVICE — NDL ECLIPSE SAF REG 18GX1.5IN

## (undated) DEVICE — MATRIX HEMOSTATIC FLOSEAL 5ML

## (undated) DEVICE — TAPE SILK 3IN

## (undated) DEVICE — DRAPE STERI INSTRUMENT 1018

## (undated) DEVICE — BLADE CLIPPER NEURO / MDL 4411

## (undated) DEVICE — TOOL MR8 MATCH HEAD 14CM 3MM

## (undated) DEVICE — GLOVE BIOGEL PIMICRO INDIC 8.5

## (undated) DEVICE — SUT MCRYL PLUS 4-0 PS2 27IN

## (undated) DEVICE — ELECTRODE REM PLYHSV RETURN 9

## (undated) DEVICE — CONTAINER SPECIMEN OR STER 4OZ

## (undated) DEVICE — TAPE MEDIPORE H CLOTH 2INX2YD

## (undated) DEVICE — ECOSTAIN SET

## (undated) DEVICE — COVER XRAY 24.5X24IN

## (undated) DEVICE — ALCOHOL 70% ANTISEPTIC ISO 4OZ

## (undated) DEVICE — TUBING SUC UNIV W/CONN 12FT